# Patient Record
Sex: FEMALE | Race: WHITE | NOT HISPANIC OR LATINO | Employment: UNEMPLOYED | URBAN - NONMETROPOLITAN AREA
[De-identification: names, ages, dates, MRNs, and addresses within clinical notes are randomized per-mention and may not be internally consistent; named-entity substitution may affect disease eponyms.]

---

## 2019-06-06 ENCOUNTER — TRANSCRIBE ORDERS (OUTPATIENT)
Dept: ULTRASOUND IMAGING | Facility: HOSPITAL | Age: 59
End: 2019-06-06

## 2019-06-06 DIAGNOSIS — I70.0 ATHEROSCLEROSIS OF AORTA (HCC): Primary | ICD-10-CM

## 2019-06-19 ENCOUNTER — HOSPITAL ENCOUNTER (OUTPATIENT)
Dept: ULTRASOUND IMAGING | Facility: HOSPITAL | Age: 59
Discharge: HOME | End: 2019-06-19
Admitting: NURSE PRACTITIONER

## 2019-06-19 DIAGNOSIS — I70.0 ATHEROSCLEROSIS OF AORTA (HCC): ICD-10-CM

## 2019-06-19 PROCEDURE — 76706 US ABDL AORTA SCREEN AAA: CPT

## 2020-01-16 ENCOUNTER — HOSPITAL ENCOUNTER (OUTPATIENT)
Dept: GENERAL RADIOLOGY | Facility: HOSPITAL | Age: 60
Discharge: HOME | End: 2020-01-16

## 2020-01-16 ENCOUNTER — HOSPITAL ENCOUNTER (OUTPATIENT)
Dept: GENERAL RADIOLOGY | Facility: HOSPITAL | Age: 60
Discharge: HOME | End: 2020-01-16
Admitting: NURSE PRACTITIONER

## 2020-01-16 DIAGNOSIS — M54.2 NECK PAIN: ICD-10-CM

## 2020-01-16 DIAGNOSIS — M25.551 PAIN OF BOTH HIP JOINTS: ICD-10-CM

## 2020-01-16 DIAGNOSIS — M81.0 OSTEOPOROSIS OF LUMBAR SPINE: ICD-10-CM

## 2020-01-16 DIAGNOSIS — M25.552 PAIN OF BOTH HIP JOINTS: ICD-10-CM

## 2020-01-16 DIAGNOSIS — M54.50 LOW BACK PAIN, UNSPECIFIED BACK PAIN LATERALITY, UNSPECIFIED CHRONICITY, UNSPECIFIED WHETHER SCIATICA PRESENT: ICD-10-CM

## 2020-01-16 DIAGNOSIS — M54.9 BACK PAIN, UNSPECIFIED BACK LOCATION, UNSPECIFIED BACK PAIN LATERALITY, UNSPECIFIED CHRONICITY: ICD-10-CM

## 2020-01-16 PROCEDURE — 72220 X-RAY EXAM SACRUM TAILBONE: CPT

## 2020-01-16 PROCEDURE — 72110 X-RAY EXAM L-2 SPINE 4/>VWS: CPT

## 2020-01-16 PROCEDURE — 72050 X-RAY EXAM NECK SPINE 4/5VWS: CPT

## 2020-01-16 PROCEDURE — 73522 X-RAY EXAM HIPS BI 3-4 VIEWS: CPT

## 2020-03-05 RX ORDER — ATORVASTATIN CALCIUM 20 MG/1
20 TABLET, FILM COATED ORAL NIGHTLY
COMMUNITY
End: 2022-10-03

## 2020-03-05 RX ORDER — MELOXICAM 7.5 MG/1
7.5 TABLET ORAL 2 TIMES DAILY
COMMUNITY
End: 2022-05-03

## 2020-03-05 RX ORDER — CALCIUM CITRATE/VITAMIN D3 200MG-6.25
1 TABLET ORAL DAILY
COMMUNITY
End: 2022-05-03

## 2020-03-05 RX ORDER — TIZANIDINE 4 MG/1
4 TABLET ORAL 2 TIMES DAILY
COMMUNITY
End: 2022-05-03

## 2020-03-05 RX ORDER — BIOTIN 1000 MCG
2 TABLET,CHEWABLE ORAL DAILY
COMMUNITY
End: 2022-05-03

## 2020-03-05 RX ORDER — CYANOCOBALAMIN (VITAMIN B-12) 500 MCG
500 TABLET ORAL DAILY
COMMUNITY
End: 2022-05-03

## 2020-03-09 ENCOUNTER — ANESTHESIA (OUTPATIENT)
Dept: GASTROENTEROLOGY | Facility: HOSPITAL | Age: 60
End: 2020-03-09

## 2020-03-09 ENCOUNTER — HOSPITAL ENCOUNTER (OUTPATIENT)
Facility: HOSPITAL | Age: 60
Setting detail: HOSPITAL OUTPATIENT SURGERY
Discharge: HOME | End: 2020-03-09
Attending: INTERNAL MEDICINE | Admitting: INTERNAL MEDICINE

## 2020-03-09 ENCOUNTER — ANESTHESIA EVENT (OUTPATIENT)
Dept: GASTROENTEROLOGY | Facility: HOSPITAL | Age: 60
End: 2020-03-09

## 2020-03-09 VITALS
SYSTOLIC BLOOD PRESSURE: 123 MMHG | BODY MASS INDEX: 25.3 KG/M2 | TEMPERATURE: 97.5 F | HEART RATE: 67 BPM | DIASTOLIC BLOOD PRESSURE: 66 MMHG | OXYGEN SATURATION: 100 % | WEIGHT: 134 LBS | HEIGHT: 61 IN | RESPIRATION RATE: 18 BRPM

## 2020-03-09 PROCEDURE — 25010000002 PROPOFOL 10 MG/ML EMULSION: Performed by: NURSE ANESTHETIST, CERTIFIED REGISTERED

## 2020-03-09 RX ORDER — LIDOCAINE HYDROCHLORIDE 20 MG/ML
INJECTION, SOLUTION EPIDURAL; INFILTRATION; INTRACAUDAL; PERINEURAL AS NEEDED
Status: DISCONTINUED | OUTPATIENT
Start: 2020-03-09 | End: 2020-03-09 | Stop reason: SURG

## 2020-03-09 RX ORDER — PROPOFOL 10 MG/ML
VIAL (ML) INTRAVENOUS AS NEEDED
Status: DISCONTINUED | OUTPATIENT
Start: 2020-03-09 | End: 2020-03-09 | Stop reason: SURG

## 2020-03-09 RX ORDER — DEXTROSE AND SODIUM CHLORIDE 5; .45 G/100ML; G/100ML
30 INJECTION, SOLUTION INTRAVENOUS CONTINUOUS PRN
Status: DISCONTINUED | OUTPATIENT
Start: 2020-03-09 | End: 2020-03-09 | Stop reason: HOSPADM

## 2020-03-09 RX ADMIN — LIDOCAINE HYDROCHLORIDE 50 MG: 20 INJECTION, SOLUTION EPIDURAL; INFILTRATION; INTRACAUDAL; PERINEURAL at 14:08

## 2020-03-09 RX ADMIN — DEXTROSE AND SODIUM CHLORIDE 30 ML/HR: 5; 450 INJECTION, SOLUTION INTRAVENOUS at 13:32

## 2020-03-09 RX ADMIN — PROPOFOL 20 MG: 10 INJECTION, EMULSION INTRAVENOUS at 14:13

## 2020-03-09 RX ADMIN — PROPOFOL 20 MG: 10 INJECTION, EMULSION INTRAVENOUS at 14:15

## 2020-03-09 RX ADMIN — PROPOFOL 70 MG: 10 INJECTION, EMULSION INTRAVENOUS at 14:08

## 2020-03-09 RX ADMIN — PROPOFOL 20 MG: 10 INJECTION, EMULSION INTRAVENOUS at 14:11

## 2020-03-09 NOTE — ANESTHESIA POSTPROCEDURE EVALUATION
Patient: Radha Duarte    Procedure Summary     Date:  03/09/20 Room / Location:  [facility] ENDOSCOPY 2 / [facility] ENDOSCOPY    Anesthesia Start:  1406 Anesthesia Stop:  1422    Procedure:  COLONOSCOPY (N/A ) Diagnosis:       Screen for colon cancer      (Screen for colon cancer [Z12.11])    Surgeon:  Bishop Rodriguez DO Provider:  Maged Lee CRNA    Anesthesia Type:  MAC ASA Status:  2          Anesthesia Type: MAC    Vitals  No vitals data found for the desired time range.          Post Anesthesia Care and Evaluation    Patient location during evaluation: bedside  Patient participation: complete - patient participated  Level of consciousness: awake and alert  Pain score: 0  Pain management: adequate  Airway patency: patent  Anesthetic complications: No anesthetic complications  PONV Status: none  Cardiovascular status: acceptable  Respiratory status: acceptable, unassisted, room air and spontaneous ventilation  Hydration status: acceptable

## 2020-03-09 NOTE — ANESTHESIA PREPROCEDURE EVALUATION
Anesthesia Evaluation     Patient summary reviewed and Nursing notes reviewed   NPO Solid Status: > 8 hours  NPO Liquid Status: > 8 hours           Airway   Mallampati: III  TM distance: >3 FB  Neck ROM: full  No difficulty expected  Dental - normal exam     Pulmonary - normal exam   (+) a smoker,   Cardiovascular - normal exam    (+) hyperlipidemia,       Neuro/Psych  GI/Hepatic/Renal/Endo      Musculoskeletal     Abdominal    Substance History      OB/GYN          Other                      Anesthesia Plan    ASA 2     MAC     intravenous induction     Anesthetic plan, all risks, benefits, and alternatives have been provided, discussed and informed consent has been obtained with: patient.    Plan discussed with CRNA.

## 2020-03-09 NOTE — H&P
Tori Tejeda DO,[facility]  Gastroenterology  Hepatology  Endoscopy  Board Certified in Internal Medicine and gastroenterology  [address], [address]  [place]. [address]  - [phone]   F - [phone]     GASTROENTEROLOGY HISTORY AND PHYSICAL  NOTE   TORI TEJEDA DO.         SUBJECTIVE:   3/9/2020    Name: Radha Duarte  DOD: 1960        Chief Complaint:       Subjective : Screening for colon cancer    Patient is 59 y.o. female presents with desire for elective colonoscopy.      ROS/HISTORY/ CURRENT MEDICATIONS/OBJECTIVE/VS/PE:   Review of Systems:  All systems unremarkable unless specified below.  Constitutional   HENT  Eyes   Respiratory    Cardiovascular  Gastrointestinal   Endocrine  Genitourinary    Musculoskeletal   Skin  Allergic/Immunologic    Neurological    Hematological  Psychiatric/Behavioral    History:     Past Medical History:   Diagnosis Date   • Hyperlipidemia    • Osteoporosis      Past Surgical History:   Procedure Laterality Date   • VARICOSE VEIN SURGERY       History reviewed. No pertinent family history.  Social History     Tobacco Use   • Smoking status: Current Every Day Smoker     Packs/day: 1.00     Types: Cigarettes     Start date: 1982   • Smokeless tobacco: Never Used   Substance Use Topics   • Alcohol use: Never     Frequency: Never   • Drug use: Never     Prior to Admission medications    Medication Sig Start Date End Date Taking? Authorizing Provider   atorvastatin (LIPITOR) 20 MG tablet Take 20 mg by mouth Daily.   Yes ProviderOmid MD   Biotin 1000 MCG chewable tablet Chew 2 tablets Daily.   Yes ProviderOmid MD   Calcium Carb-Cholecalciferol (CALCIUM/VITAMIN D PO) Take 1 tablet by mouth 2 (Two) Times a Day.   Yes Omid Barber MD   Cyanocobalamin (VITAMIN B 12) 500 MCG tablet Take 500 mcg by mouth Daily.   Yes Omid Barber MD   Ibandronate Sodium (BONIVA PO) Take 1 capsule by mouth Every 30 (Thirty) Days.   Yes  Provider, MD Omid   meloxicam (MOBIC) 7.5 MG tablet Take 7.5 mg by mouth 2 (Two) Times a Day.   Yes Omid Barber MD   tiZANidine (ZANAFLEX) 4 MG tablet Take 4 mg by mouth 2 (Two) Times a Day.   Yes Omid Barber MD   Multiple Vitamins-Minerals (MULTIVITAMIN GUMMIES WOMENS) chewable tablet Chew 1 capsule Daily.    Provider, MD Omid     Allergies:  Patient has no known allergies.    I have reviewed the patients medical history, surgical history and family history in the available medical record system.     Current Medications:     Current Facility-Administered Medications   Medication Dose Route Frequency Provider Last Rate Last Dose   • dextrose 5 % and sodium chloride 0.45 % infusion  30 mL/hr Intravenous Continuous PRN Bishop Rodriguez DO 30 mL/hr at 03/09/20 1332 30 mL/hr at 03/09/20 1332       Objective     Physical Exam:   Temp:  [97.8 °F (36.6 °C)] 97.8 °F (36.6 °C)  Heart Rate:  [68] 68  Resp:  [18] 18  BP: (123)/(61) 123/61    Physical Exam:  General Appearance:    Alert, cooperative, in no acute distress   Head:    Normocephalic, without obvious abnormality, atraumatic   Eyes:            Lids and lashes normal, conjunctivae and sclerae normal, no   icterus, no pallor, corneas clear, PERRLA   Ears:    Ears appear intact with no abnormalities noted   Throat:   No oral lesions, no thrush, oral mucosa moist   Neck:   No adenopathy, supple, trachea midline, no thyromegaly, no     carotid bruit, no JVD   Back:     No kyphosis present, no scoliosis present, no skin lesions,       erythema or scars, no tenderness to percussion or                   palpation,   range of motion normal   Lungs:     Clear to auscultation,respirations regular, even and                   unlabored    Heart:    Regular rhythm and normal rate, normal S1 and S2, no            murmur, no gallop, no rub, no click   Breast Exam:    Deferred   Abdomen:     Normal bowel sounds, no masses, no organomegaly, soft         non-tender, non-distended, no guarding, no rebound                 tenderness   Genitalia:    Deferred   Extremities:   Moves all extremities well, no edema, no cyanosis, no              redness   Pulses:   Pulses palpable and equal bilaterally   Skin:   No bleeding, bruising or rash   Lymph nodes:   No palpable adenopathy   Neurologic:   Cranial nerves 2 - 12 grossly intact, sensation intact, DTR        present and equal bilaterally      Results Review:     No results found for: WBC, HGB, HCT, PLT          No results found for: LIPASE  No results found for: INR  No results found for: CULTURE    Radiology Review:  Imaging Results (Last 72 Hours)     ** No results found for the last 72 hours. **           I reviewed the patient's new clinical results.  I reviewed the patient's new imaging results and agree with the interpretation.     ASSESSMENT/PLAN:   ASSESSMENT:  1.  Screen for colon cancer    PLAN:  1.  Colonoscopy    Risk and benefits associated with the procedure are reviewed with the patient.  The patient wished to proceed     Bishop Rodriguez DO  03/09/20  2:04 PM

## 2021-10-18 ENCOUNTER — HOSPITAL ENCOUNTER (OUTPATIENT)
Dept: GENERAL RADIOLOGY | Facility: HOSPITAL | Age: 61
Discharge: HOME | End: 2021-10-18
Admitting: NURSE PRACTITIONER

## 2021-10-18 DIAGNOSIS — R22.1 NECK MASS: ICD-10-CM

## 2021-10-18 DIAGNOSIS — J41.0 SMOKERS' COUGH (HCC): ICD-10-CM

## 2021-10-18 PROCEDURE — 71046 X-RAY EXAM CHEST 2 VIEWS: CPT

## 2022-04-28 ENCOUNTER — TELEPHONE (OUTPATIENT)
Dept: ONCOLOGY | Facility: CLINIC | Age: 62
End: 2022-04-28

## 2022-04-28 NOTE — TELEPHONE ENCOUNTER
Spoke withy daughter Viri Hayward today concerning transfer of care as the nurse navigator in breast cancer care. Discussed current status and arranged for consult with med onc Dr. Troncoso here next week on 5/3/2022 at 9:15. Pt daughter stated thankfulness for [facility] and grateful for the timeliness. Informed pt daughter of my role in breast cancer care her at [facility] and provided my contact information for pt or daughter to reach out anytime with concerns or questions. Encouraged to reach out anytime. Will make face to face contact with pt at [facility] next week and will follow and be available throughout cancer care as needed for support, education, and care coordination. Pt daughter stated understanding of all discussed and denied further questions today.

## 2022-04-28 NOTE — TELEPHONE ENCOUNTER
----- Message from Bri Greene RN sent at 4/27/2022  3:52 PM CDT -----  Regarding: Transfer Care  Caridad,    This pt is receiving tx at [facility], but per the daughter, wants to transfer her care here. Carolin Paredes is her neighbor, and the daughter asked her for help, so she came to me. My understanding is the patients  also has liver cancer, and she had been caring for him. I dont see a path report, but per the daughter, shes triple neg breast, and enlarged lymph nodes are causing her arm to swell. It looks like she had a US last week and has DVT. It looks like she got Taxol/Carbo today, and is due again Friday. I told Carolin to let the daughter know we wouldn't be able to get her moved by Friday, but I would have you call her to help coordinate.     Daughter is Viri Hayward, [phone]. Per Carolin, there are two daughters swapping up caregiver duties for their parents.     Thanks!

## 2022-05-03 ENCOUNTER — CONSULT (OUTPATIENT)
Dept: ONCOLOGY | Facility: CLINIC | Age: 62
End: 2022-05-03

## 2022-05-03 ENCOUNTER — DOCUMENTATION (OUTPATIENT)
Dept: ONCOLOGY | Facility: CLINIC | Age: 62
End: 2022-05-03

## 2022-05-03 ENCOUNTER — LAB (OUTPATIENT)
Dept: ONCOLOGY | Facility: HOSPITAL | Age: 62
End: 2022-05-03

## 2022-05-03 VITALS
DIASTOLIC BLOOD PRESSURE: 60 MMHG | WEIGHT: 136.1 LBS | SYSTOLIC BLOOD PRESSURE: 129 MMHG | OXYGEN SATURATION: 96 % | BODY MASS INDEX: 25.04 KG/M2 | TEMPERATURE: 96.9 F | HEART RATE: 82 BPM | HEIGHT: 62 IN

## 2022-05-03 DIAGNOSIS — C50.919 MALIGNANT NEOPLASM OF FEMALE BREAST, UNSPECIFIED ESTROGEN RECEPTOR STATUS, UNSPECIFIED LATERALITY, UNSPECIFIED SITE OF BREAST: ICD-10-CM

## 2022-05-03 DIAGNOSIS — C50.919 MALIGNANT NEOPLASM OF FEMALE BREAST, UNSPECIFIED ESTROGEN RECEPTOR STATUS, UNSPECIFIED LATERALITY, UNSPECIFIED SITE OF BREAST: Primary | ICD-10-CM

## 2022-05-03 DIAGNOSIS — I82.A12 ACUTE DEEP VEIN THROMBOSIS (DVT) OF AXILLARY VEIN OF LEFT UPPER EXTREMITY: ICD-10-CM

## 2022-05-03 DIAGNOSIS — G89.3 CANCER ASSOCIATED PAIN: ICD-10-CM

## 2022-05-03 LAB
ALBUMIN SERPL-MCNC: 3.4 G/DL (ref 3.5–5.2)
ALBUMIN/GLOB SERPL: 1.3 G/DL
ALP SERPL-CCNC: 77 U/L (ref 39–117)
ALT SERPL W P-5'-P-CCNC: 62 U/L (ref 1–33)
ANION GAP SERPL CALCULATED.3IONS-SCNC: 8 MMOL/L (ref 5–15)
AST SERPL-CCNC: 66 U/L (ref 1–32)
BASOPHILS # BLD AUTO: 0.01 10*3/MM3 (ref 0–0.2)
BASOPHILS NFR BLD AUTO: 0.3 % (ref 0–1.5)
BILIRUB SERPL-MCNC: 0.7 MG/DL (ref 0–1.2)
BUN SERPL-MCNC: 10 MG/DL (ref 8–23)
BUN/CREAT SERPL: 16.4 (ref 7–25)
CALCIUM SPEC-SCNC: 8.5 MG/DL (ref 8.6–10.5)
CHLORIDE SERPL-SCNC: 104 MMOL/L (ref 98–107)
CO2 SERPL-SCNC: 27 MMOL/L (ref 22–29)
CREAT SERPL-MCNC: 0.61 MG/DL (ref 0.57–1)
DEPRECATED RDW RBC AUTO: 40.3 FL (ref 37–54)
EGFRCR SERPLBLD CKD-EPI 2021: 100.6 ML/MIN/1.73
EOSINOPHIL # BLD AUTO: 0.04 10*3/MM3 (ref 0–0.4)
EOSINOPHIL NFR BLD AUTO: 1 % (ref 0.3–6.2)
ERYTHROCYTE [DISTWIDTH] IN BLOOD BY AUTOMATED COUNT: 13.2 % (ref 12.3–15.4)
GLOBULIN UR ELPH-MCNC: 2.7 GM/DL
GLUCOSE SERPL-MCNC: 89 MG/DL (ref 65–99)
HCT VFR BLD AUTO: 28.5 % (ref 34–46.6)
HGB BLD-MCNC: 9.7 G/DL (ref 12–15.9)
IMM GRANULOCYTES # BLD AUTO: 0.02 10*3/MM3 (ref 0–0.05)
IMM GRANULOCYTES NFR BLD AUTO: 0.5 % (ref 0–0.5)
LYMPHOCYTES # BLD AUTO: 0.64 10*3/MM3 (ref 0.7–3.1)
LYMPHOCYTES NFR BLD AUTO: 16.6 % (ref 19.6–45.3)
MCH RBC QN AUTO: 29 PG (ref 26.6–33)
MCHC RBC AUTO-ENTMCNC: 34 G/DL (ref 31.5–35.7)
MCV RBC AUTO: 85.3 FL (ref 79–97)
MONOCYTES # BLD AUTO: 0.1 10*3/MM3 (ref 0.1–0.9)
MONOCYTES NFR BLD AUTO: 2.6 % (ref 5–12)
NEUTROPHILS NFR BLD AUTO: 3.05 10*3/MM3 (ref 1.7–7)
NEUTROPHILS NFR BLD AUTO: 79 % (ref 42.7–76)
NRBC BLD AUTO-RTO: 0 /100 WBC (ref 0–0.2)
PLATELET # BLD AUTO: 193 10*3/MM3 (ref 140–450)
PMV BLD AUTO: 10.4 FL (ref 6–12)
POTASSIUM SERPL-SCNC: 3.4 MMOL/L (ref 3.5–5.2)
PROT SERPL-MCNC: 6.1 G/DL (ref 6–8.5)
RBC # BLD AUTO: 3.34 10*6/MM3 (ref 3.77–5.28)
SODIUM SERPL-SCNC: 139 MMOL/L (ref 136–145)
WBC NRBC COR # BLD: 3.86 10*3/MM3 (ref 3.4–10.8)

## 2022-05-03 PROCEDURE — G0463 HOSPITAL OUTPT CLINIC VISIT: HCPCS | Performed by: INTERNAL MEDICINE

## 2022-05-03 PROCEDURE — 99205 OFFICE O/P NEW HI 60 MIN: CPT | Performed by: INTERNAL MEDICINE

## 2022-05-03 PROCEDURE — 80053 COMPREHEN METABOLIC PANEL: CPT

## 2022-05-03 PROCEDURE — 85025 COMPLETE CBC W/AUTO DIFF WBC: CPT

## 2022-05-03 RX ORDER — PROMETHAZINE HYDROCHLORIDE 25 MG/1
25 SUPPOSITORY RECTAL AS NEEDED
COMMUNITY
Start: 2022-04-27 | End: 2022-06-14

## 2022-05-03 RX ORDER — OMEPRAZOLE 20 MG/1
20 CAPSULE, DELAYED RELEASE ORAL 2 TIMES DAILY
COMMUNITY
Start: 2022-04-22 | End: 2022-07-22 | Stop reason: SDUPTHER

## 2022-05-03 RX ORDER — ONDANSETRON HYDROCHLORIDE 8 MG/1
8 TABLET, FILM COATED ORAL EVERY 8 HOURS PRN
COMMUNITY
Start: 2022-05-02 | End: 2022-07-01 | Stop reason: SDUPTHER

## 2022-05-03 RX ORDER — MORPHINE SULFATE 30 MG/1
60 TABLET, FILM COATED, EXTENDED RELEASE ORAL 2 TIMES DAILY
Qty: 60 TABLET | Refills: 0 | Status: SHIPPED | OUTPATIENT
Start: 2022-05-03 | End: 2022-05-18 | Stop reason: SDUPTHER

## 2022-05-03 RX ORDER — POTASSIUM CHLORIDE 20 MEQ/1
20 TABLET, EXTENDED RELEASE ORAL DAILY
COMMUNITY
Start: 2022-04-29 | End: 2022-05-25 | Stop reason: SDUPTHER

## 2022-05-03 RX ORDER — DOCUSATE SODIUM 100 MG/1
100 CAPSULE, LIQUID FILLED ORAL 2 TIMES DAILY
COMMUNITY
End: 2022-05-17

## 2022-05-03 RX ORDER — HYDROCODONE BITARTRATE AND ACETAMINOPHEN 5; 325 MG/1; MG/1
TABLET ORAL
COMMUNITY
Start: 2022-04-27 | End: 2022-05-16 | Stop reason: SDUPTHER

## 2022-05-03 RX ORDER — APIXABAN 5 MG/1
TABLET, FILM COATED ORAL
COMMUNITY
Start: 2022-04-22 | End: 2022-05-18 | Stop reason: SDUPTHER

## 2022-05-03 RX ORDER — MORPHINE SULFATE 30 MG/1
60 TABLET, FILM COATED, EXTENDED RELEASE ORAL 2 TIMES DAILY
Qty: 60 TABLET | Refills: 0 | Status: SHIPPED | OUTPATIENT
Start: 2022-05-03 | End: 2022-05-03 | Stop reason: SDUPTHER

## 2022-05-03 RX ORDER — MORPHINE SULFATE 30 MG/1
TABLET, FILM COATED, EXTENDED RELEASE ORAL
COMMUNITY
Start: 2022-04-20 | End: 2022-05-03 | Stop reason: SDUPTHER

## 2022-05-03 RX ORDER — PROCHLORPERAZINE MALEATE 10 MG
TABLET ORAL
COMMUNITY
Start: 2022-04-22 | End: 2022-05-17

## 2022-05-03 NOTE — PROGRESS NOTES
"Distress Screening Follow-up    Diagnosis: Radha Duarte presents as 61 year old female from [facility] newly diagnosed with Malignant neoplasm of breast, metastatic. ,  Her Diagnosis and initiation of treatment was started at [facility]. Pt. Presents to this center for recommendations and to establish care as it  is closer to her home.    Pt. Is accompanied in the room by her daughter, Brandon Hayward.  Daughter who is her durable general POA .     Location of Distress Screening: Medical oncology consult. Out patient     Distress Level: 5 (5/3/2022  9:00 AM)    Physical Concerns/  Addressed by provider     Eating: Yes  Fatigue: Yes  Memory/Concentration: Yes  Pain: Yes  Sleep: Yes    Practical Problems:  Pt. Started palliative treatment at [facility] where diagnosed  She has been contemplating moving care closer to her home and family.  She is currently staying with her daughter who lives in [place].      Treatment decisions: Yes    Emotional Concerns:  Pt. Presents alert and orietned  3, mood and affect consistent with depression and anxious distress.  Emotions present  consistent with her situation and adjustments  having new cancer diagnosis.      Depression: Yes  Sadness: Yes  Worry: Yes    Family Concerns: Pt. Is . Per feedback from patient and daughter, spouse is diagnosed with stage IV liver cancer. He is reportedly at ED at the time of this apt.  Pt with two adult daughters who are sharing support as both pt and spouse in need of care.  Close neighbors that are \"like\" family and are supportive and assisting,Extensive caregiving needs identified.      Dealing with partner: Yes  Family health issues: Yes          Interventions: LCSW introduced self and explained role and support to include scope of clinical practice in oncology setting.  LCSW offered person centered therapeutic feedback by means of collecting history, emotional support, active listening, validation of emotions and " clarification of feedback and recommendations for care.  Provided education related to oncology supports and services.    Discussed DME needs.  [employer] insurance that reportedly will assist with adult briefs.  Pt to advise if Rx is needed.   SW reviewed  POA on file, indicates POA is financial only. No Advance directive is on file.   Comments: No immediate needs were identified.  FLO will follow and assess further as pt plans to begin treatment at this center.

## 2022-05-05 ENCOUNTER — TELEPHONE (OUTPATIENT)
Dept: NUTRITION | Facility: HOSPITAL | Age: 62
End: 2022-05-05

## 2022-05-05 NOTE — PROGRESS NOTES
Adult Outpatient Nutrition  Assessment    Patient Name:  Radha Duarte  YOB: 1960  MRN: [id number]    Assessment Date:  5/5/2022    Comments: Chart review revealed 61WF coming to [facility] due to breast cancer. Has been receiving chemo in [place].  Moving care closer to home. Ht 62 in. Wt 136.1 lb (stable). Alb 3.4. Noted that  also very ill. No indication of restricted diet, food allergies or  immediate nutrition related problems. Attempted phone contact without success. Left message introducing self/nutrition services. Mailed nutrition suggestions with RDN's name/number to home.                         Electronically signed by:  Dolores Hinson RD  05/05/22 12:52 CDT

## 2022-05-06 ENCOUNTER — TELEPHONE (OUTPATIENT)
Dept: ONCOLOGY | Facility: HOSPITAL | Age: 62
End: 2022-05-06

## 2022-05-06 ENCOUNTER — DOCUMENTATION (OUTPATIENT)
Dept: ONCOLOGY | Facility: HOSPITAL | Age: 62
End: 2022-05-06

## 2022-05-06 NOTE — TELEPHONE ENCOUNTER
Contacted daughter and advised of chemo teach appt, labs, MD, chemo appts. Daughter verbalizes understanding.

## 2022-05-09 ENCOUNTER — OFFICE VISIT (OUTPATIENT)
Dept: ONCOLOGY | Facility: CLINIC | Age: 62
End: 2022-05-09

## 2022-05-09 VITALS
SYSTOLIC BLOOD PRESSURE: 120 MMHG | TEMPERATURE: 96.9 F | OXYGEN SATURATION: 96 % | WEIGHT: 128 LBS | RESPIRATION RATE: 18 BRPM | BODY MASS INDEX: 23.41 KG/M2 | HEART RATE: 77 BPM | DIASTOLIC BLOOD PRESSURE: 56 MMHG

## 2022-05-09 DIAGNOSIS — C50.919 MALIGNANT NEOPLASM OF FEMALE BREAST, UNSPECIFIED ESTROGEN RECEPTOR STATUS, UNSPECIFIED LATERALITY, UNSPECIFIED SITE OF BREAST: Primary | ICD-10-CM

## 2022-05-09 PROCEDURE — G0463 HOSPITAL OUTPT CLINIC VISIT: HCPCS | Performed by: NURSE PRACTITIONER

## 2022-05-09 PROCEDURE — 99213 OFFICE O/P EST LOW 20 MIN: CPT | Performed by: NURSE PRACTITIONER

## 2022-05-10 ENCOUNTER — DOCUMENTATION (OUTPATIENT)
Dept: NUTRITION | Facility: HOSPITAL | Age: 62
End: 2022-05-10

## 2022-05-10 NOTE — PROGRESS NOTES
5/9/2022    CHEMOTHERAPY PREPARATION    Radha Duarte  [id number]  1960    Chief Complaint: chemo education     History of present illness:  Radha uDarte is a 61 y.o. year old female who is here today for chemotherapy preparation and needs assessment. The patient has been diagnosed with breast cancer and is scheduled to begin treatment with Carboplatin and Paclitaxel.     Oncology History:    Oncology/Hematology History   Malignant neoplasm of female breast (HCC)   5/3/2022 Initial Diagnosis    Malignant neoplasm of female breast (HCC)     5/13/2022 -  Chemotherapy    OP BREAST  PACLitaxel / CARBOplatin AUC=1.5 (Weekly)         Past Medical History:   Diagnosis Date   • Acute deep vein thrombosis (DVT) of brachial vein of right upper extremity (HCC)    • Anemia    • Breast cancer (HCC)    • Hyperlipidemia    • Osteoporosis        Past Surgical History:   Procedure Laterality Date   • BREAST BIOPSY Right    • COLONOSCOPY N/A 03/09/2020    Procedure: COLONOSCOPY;  Surgeon: Bishop Rodriguez DO;  Location: [facility] ENDOSCOPY;  Service: Gastroenterology;  Laterality: N/A;   • SHOULDER MANIPULATION  2003   • VARICOSE VEIN SURGERY         MEDICATIONS: The current medication list was reviewed and reconciled.     Allergies:  is allergic to percocet [oxycodone-acetaminophen].    Family History   Problem Relation Age of Onset   • ALS Mother    • Ulcers Mother    • Heart disease Father    • Alcohol abuse Father    • Cancer Brother    • Diabetes Brother    • Stroke Brother    • COPD Brother    • Anuerysm Brother    • Stroke Paternal Aunt    • Heart disease Paternal Grandmother    • Diabetes Paternal Grandmother          Review of Systems    Physical Exam  Vital Signs: /56   Pulse 77   Temp 96.9 °F (36.1 °C)   Resp 18   Wt 58.1 kg (128 lb)   SpO2 96%   BMI 23.41 kg/m²    General Appearance:  alert, cooperative, no apparent distress and appears stated age   Neurologic/Psychiatric: A&O x 3, gait  "steady, appropriate affect   HEENT:  Normocephalic, without obvious abnormality, mucous membranes moist   Lungs:   Clear to auscultation bilaterally; respirations regular, even, and unlabored bilaterally   Heart:  Regular rate and rhythm, no murmurs appreciated   Extremities: Normal, atraumatic; no clubbing, cyanosis, or edema    Skin: No rashes, lesions, or abnormal coloration noted     ECOG Performance Status: (1) Restricted in Physically Strenuous Activity, Ambulatory & Able to Do Work of Light Nature          NEEDS ASSESSMENTS    Genetics  The patient's new diagnosis and family history have been reviewed for genetic counseling needs. A genetic referral is not recommended.     Psychosocial  The patient has completed a PHQ-2 Depression Screening today.   PHQ-9 results show 0 (No Depression). Patient has been seen by Aimee Addison LCSW  Copies of patient's questionnaires will be scanned into EMR for details and further reference.    Barriers to care  A barriers form was also completed by the patient today. We discussed services offered by our facility to help her have adequate access to care. Based upon barriers assessment today, the patient will not require a follow-up call from the  to further discuss needs.   A copy of the barriers form will also be scanned into EMR for details and further reference.       Advanced Care Planning  The patient and I discussed advanced care planning, \"Conversations that Matter\".   This service was offered, free of charge, for development of advance directives with a certified ACP facilitator.  The patient does have an up-to-date advanced directive. This document is not on file with our office.     Palliative Care  The patient and I discussed palliative care services. Palliative care is not the same as Hospice care. This is specialized medical care for people living with serious illness with the goal of improving quality of life for the patient and their family. Joe has " partnered with [facility] to offer our patients outpatient palliative care early along with their treatment to assist in coordination of care, symptom management, pain management, and medical decision making.  Oncology criteria for palliative care referral is met at this time. The patient is not interested in a palliative care consultation.     Additional Referral needs  none      CHEMOTHERAPY EDUCATION    Booklets Given: Chemo cares education sheets on Carboplatin and Paclitaxel and [facility] chemotherapy binder.     Chemotherapy/Biotherapy Education Sheets: (list all that apply)  nausea management, acid reflux management, diarrhea management, Cancer resourse contacts information, skin and mouth care and vaccination information                                                                                                                                                                 Chemotherapy Regimen:   Treatment Plans     Name Type Plan Dates Plan Provider         Active    OP BREAST  PACLitaxel / CARBOplatin AUC=1.5 (Weekly) ONCOLOGY TREATMENT  5/12/2022 - Present Trisha Meade MD                    TOPICS EDUCATION PROVIDED COMMENTS   ANEMIA:  role of RBC, cause, s/s, ways to manage, role of transfusion [x]    THROMBOCYTOPENIA:  role of platelet, cause, s/s, ways to prevent bleeding, things to avoid, when to seek help [x]    NEUTROPENIA:  role of WBC, cause, infection precautions, s/s of infection, when to call MD [x]    NUTRITION & APPETITE CHANGES:  importance of maintaining healthy diet & weight, ways to manage to improve intake, dietary consult, exercise regimen [x]    DIARRHEA:  causes, s/s of dehydration, ways to manage, dietary changes, when to call MD [x]    CONSTIPATION:  causes, ways to manage, dietary changes, when to call MD [x]    NAUSEA & VOMITING:  cause, use of antiemetics, dietary changes, when to call MD [x]    MOUTH SORES:  causes, oral care, ways  to manage [x]    ALOPECIA:  cause, ways to manage, resources [x]    INFERTILITY & SEXUALITY:  causes, fertility preservation options, sexuality changes, ways to manage, importance of birth control [x]    NERVOUS SYSTEM CHANGES:  causes, s/s, neuropathies, cognitive changes, ways to manage [x]    PAIN:  causes, ways to manage [x] ????   SKIN & NAIL CHANGES:  cause, s/s, ways to manage [x]    ORGAN TOXICITIES:  cause, s/s, need for diagnostic tests, labs, when to notify MD [x]    SURVIVORSHIP:  distress, distress assessment, secondary malignancies, early/late effects, follow-up, social issues, social support [x]    HOME CARE:  use of spill kits, storing of PO chemo, how to manage bodily fluids [x]    MISCELLANEOUS:  drug interactions, administration, vesicant, et [x]        Assessment and Plan:    Diagnoses and all orders for this visit:    1. Malignant neoplasm of female breast, unspecified estrogen receptor status, unspecified laterality, unspecified site of breast (HCC) (Primary)        This was a 20 minute face-to-face visit with 20 minutes spent in  counseling and coordination of care as documented above.   The patient and I have reviewed their new cancer diagnosis and scheduled treatment plan. Needs assessment was completed including genetics, psychosocial needs, barriers to care, VAD evaluation, advanced care planning, and palliative care services. Referrals have been ordered as appropriate based upon our evaluation and patient desires.     Chemotherapy teaching was also completed today as documented above. Adequate time was given to answer all questions to her satisfaction. Patient and family are aware of their care team members and contact information if they have questions or problems throughout the treatment course. Needs assessments and education has been completed. The patient is adequately prepared to begin treatment as scheduled.       Estefanía Brady, APRN

## 2022-05-10 NOTE — PROGRESS NOTES
Adult Outpatient Nutrition  Assessment    Patient Name:  Radha Duarte  YOB: 1960  MRN: [id number]    Assessment Date:  Entry from 5/9/22    CHART REVIEW  Radha Duarte   1960  61 y.o.  Wt: 128 lb  Ht: 62 in  Dx: breast cancer  Tx: chemo in [place] (moving care here)  Labs: alb 3.4    PATIENT/FAMILY COMMENTS  Weight Change: recent trend down  Diet: regular  Food Allergies: nkfa  Number of Feedings Daily:  Small frequent  Appetite/Intake: better  Supplements: Ensure  Meal Preparation: family   Nutrition Concerns:  * recent oral ulcers (healing)  No problems nausea/vomiting/constipation/diarrhea.       very ill (on Hospice)--much anxiety for pt/family.        GOAL(s)  -to optimize nutrition in attempt to prevent loss of LBM        EDUCATION  Discussed  -supplements (samples + recipes + discount coupons provided)  -importance of balanced nutrition/adequate protein  -importance of staying hydrated  -food safety    To reinforce education, written information with RD's name & number provided.  Patient/daughter very kind and receptive to suggestions--agreed to call on dietitian as needed.              Dolores Hinson RD                                Electronically signed by:  Dolores Hinson RD  05/10/22 08:19 CDT

## 2022-05-13 ENCOUNTER — APPOINTMENT (OUTPATIENT)
Dept: ONCOLOGY | Facility: CLINIC | Age: 62
End: 2022-05-13

## 2022-05-13 ENCOUNTER — APPOINTMENT (OUTPATIENT)
Dept: ONCOLOGY | Facility: HOSPITAL | Age: 62
End: 2022-05-13

## 2022-05-13 ENCOUNTER — DOCUMENTATION (OUTPATIENT)
Dept: ONCOLOGY | Facility: HOSPITAL | Age: 62
End: 2022-05-13

## 2022-05-13 DIAGNOSIS — C50.919 MALIGNANT NEOPLASM OF FEMALE BREAST, UNSPECIFIED ESTROGEN RECEPTOR STATUS, UNSPECIFIED LATERALITY, UNSPECIFIED SITE OF BREAST: Primary | ICD-10-CM

## 2022-05-15 PROBLEM — I82.A12 ACUTE DEEP VEIN THROMBOSIS (DVT) OF AXILLARY VEIN OF LEFT UPPER EXTREMITY: Status: ACTIVE | Noted: 2022-05-15

## 2022-05-15 NOTE — PROGRESS NOTES
REASON FOR CONSULTATION:  Metastatic breast cancer   Provide an opinion on any further workup or treatment                             REQUESTING PHYSICIAN:  Carloz Rachel MD    RECORDS OBTAINED:  Records of the patients history including those obtained from the referring provider were reviewed and summarized in detail.        History of Present Illness     This is a pleasant 62-year-old female who was seen in consultation at the request of Dr Rachel.   Her extensive history as below.    HEM/ONC HISTORY:  - 11/2021initially presented with right chest wall mass. Mammogram at the time was normal.  - 2/15/2022 CT neck showed a large, 5 cm, mass within the right retropectoral region which may be invading the anterior chest wall.  - 2/22/2022 CT chest showed Large necrotic right axillary/retropectoral lymphadenopathy, likely malignancy, Lymphadenopathy also present within the mediastinum, Subtle interstitial opacities suspected throughout both lungs.   - 2/22/2022 right chest lymph node core biopsy showed Poorly differentiated carcinoma. the immunostains panel is suggestive of metastatic mammary carcinoma. ER-[place]-HER2-. Foundation one sent out on 4/25 with result pending.   - 3/6/2022 established care in oncology clinic. Recommend PET/CT scan and breast MRI  - 3/15/2022 PET/CT scan showed Suspected primary mass in the right subpectoral chest wall with metastatic   lymphadenopathy in the right axillary, right internal mammary, bilateral supraclavicular, and mediastinal regions.  - 3/18/2022 breast MRI: Discontiguous mass and nonmass enhancement predominantly involving the  superior right breast is considered highly suspicious for breast malignancy, Abnormal axillary lymph nodes and abnormal lymph nodes along the right internal mammary chain.  - 3/22/2022 diagnostic mammogram showed hypoechoic nodule in the 12:00 position of the right breast, this  is located approximately 4 cm from the nipple, this is highly suspicious for  malignancy.  - 3/22/2022 right breast mass biopsy showed Very small foci of poorly differentiated carcinoma within angiolymphatic spaces, multifocal (consistent with lymphovascular space invasion). ER-HER2- tissue not adequate to check [place].   - 3/24/2022 refer to radiation oncology for palliative radiation therapy and radiation was delayed. But she was determined NOT a candidate for radiation due to rapid growth of the tumor on 4/26/2022.   - 4/22/2022 start on palliative carboplatin/palictaxel weekly . Also started on eliquis for RUE DVT.       Patient wanted to receive her chemo close to home and has been referred to me for further evaluation and management.    Past Medical History:   Diagnosis Date   • Acute deep vein thrombosis (DVT) of brachial vein of right upper extremity (HCC)    • Anemia    • Breast cancer (HCC)    • Hyperlipidemia    • Osteoporosis         Past Surgical History:   Procedure Laterality Date   • BREAST BIOPSY Right    • COLONOSCOPY N/A 03/09/2020    Procedure: COLONOSCOPY;  Surgeon: Bishop Rodriguez DO;  Location: [facility] ENDOSCOPY;  Service: Gastroenterology;  Laterality: N/A;   • SHOULDER MANIPULATION  2003   • VARICOSE VEIN SURGERY          Current Outpatient Medications on File Prior to Visit   Medication Sig Dispense Refill   • atorvastatin (LIPITOR) 20 MG tablet Take 20 mg by mouth Daily.     • Calcium Carb-Cholecalciferol (CALCIUM/VITAMIN D PO) Take 1 tablet by mouth 2 (Two) Times a Day.     • Eliquis 5 MG tablet tablet TAKE 2 TABLETS BY MOUTH TWICE DAILY FOR 7 DAYS THEN TAKE 1 TABLET BY MOUTH TWICE DAILY     • HYDROcodone-acetaminophen (NORCO) 5-325 MG per tablet TAKE 1 TABLET BY MOUTH EVERY 6 HOURS FOR 2 WEEKS AS NEEDED     • MAGIC MOUTHWASH 1/1/1 SUSP Apply 5 mL to the mouth or throat.     • omeprazole (priLOSEC) 20 MG capsule Take 20 mg by mouth 2 (Two) Times a Day.     • ondansetron (ZOFRAN) 8 MG tablet      • potassium chloride (K-DUR,KLOR-CON) 20 MEQ CR tablet      •  "prochlorperazine (COMPAZINE) 10 MG tablet      • Promethegan 25 MG suppository      • docusate sodium (COLACE) 100 MG capsule Take 100 mg by mouth 2 (Two) Times a Day.     • Ibandronate Sodium (BONIVA PO) Take 1 capsule by mouth Every 30 (Thirty) Days.       No current facility-administered medications on file prior to visit.        ALLERGIES:    Allergies   Allergen Reactions   • Percocet [Oxycodone-Acetaminophen] Nausea Only        Social History     Socioeconomic History   • Marital status:    Tobacco Use   • Smoking status: Current Every Day Smoker     Packs/day: 0.10     Types: Cigarettes     Start date: 1982   • Smokeless tobacco: Never Used   • Tobacco comment: PT states she is only smoking 1-2 cigerettes per day.   Substance and Sexual Activity   • Alcohol use: Never   • Drug use: Never   • Sexual activity: Defer        Family History   Problem Relation Age of Onset   • ALS Mother    • Ulcers Mother    • Heart disease Father    • Alcohol abuse Father    • Cancer Brother    • Diabetes Brother    • Stroke Brother    • COPD Brother    • Anuerysm Brother    • Stroke Paternal Aunt    • Heart disease Paternal Grandmother    • Diabetes Paternal Grandmother             Objective     Vitals:    05/03/22 0916   BP: 129/60   Pulse: 82   Temp: 96.9 °F (36.1 °C)   SpO2: 96%   Weight: 61.7 kg (136 lb 1.6 oz)   Height: 157.5 cm (62\")   PainSc:   2   PainLoc: Mouth     No flowsheet data found.    Physical Exam  Vitals and nursing note reviewed.   Constitutional:       Appearance: Normal appearance.   Cardiovascular:      Rate and Rhythm: Normal rate and regular rhythm.      Heart sounds: No murmur heard.  Pulmonary:      Effort: Pulmonary effort is normal. No respiratory distress.      Breath sounds: Normal breath sounds. No wheezing.   Chest:   Breasts:      Right: Axillary adenopathy present.       Lymphadenopathy:      Upper Body:      Right upper body: Axillary adenopathy present.   Neurological:      Mental " Status: She is alert.   Psychiatric:         Mood and Affect: Mood normal.         Behavior: Behavior normal.         Thought Content: Thought content normal.               RECENT LABS:Independently reviewed and summarized  Hematology WBC   Date Value Ref Range Status   05/03/2022 3.86 3.40 - 10.80 10*3/mm3 Final     RBC   Date Value Ref Range Status   05/03/2022 3.34 (L) 3.77 - 5.28 10*6/mm3 Final     Hemoglobin   Date Value Ref Range Status   05/03/2022 9.7 (L) 12.0 - 15.9 g/dL Final     Hematocrit   Date Value Ref Range Status   05/03/2022 28.5 (L) 34.0 - 46.6 % Final     Platelets   Date Value Ref Range Status   05/03/2022 193 140 - 450 10*3/mm3 Final        Lab Results   Component Value Date    GLUCOSE 89 05/03/2022    BUN 10 05/03/2022    CREATININE 0.61 05/03/2022    BCR 16.4 05/03/2022    K 3.4 (L) 05/03/2022    CO2 27.0 05/03/2022    CALCIUM 8.5 (L) 05/03/2022    ALBUMIN 3.40 (L) 05/03/2022    AST 66 (H) 05/03/2022    ALT 62 (H) 05/03/2022       Imaging (independently reviewed and summarized):     PET Scan (3/15/22)     FINDINGS:   HEAD AND NECK: The visualized brain demonstrates no definite focal abnormal FDG   activity. Salivary, tonsillar and laryngeal activity appears ordinary. Multiple   hypermetabolic supraclavicular lymph nodes are present bilaterally.     CHEST: There is a 7.7 cm area of markedly increased uptake in the right chest   wall, deep to the pectoralis muscle. This demonstrates a maximum SUV of 22.2.   Multiple supraclavicular and axillary lymph nodes are present and demonstrate   increased metabolic activity. Additionally, there is increased uptake within   mildly enlarged paratracheal and para-aortic lymph nodes. There is also   increased uptake in the region of the right internal mammary chain. The   previously seen diffuse nodular or miliary pattern in the lungs is not   appreciated on this study. No dominant lung nodule is identified.     ABDOMEN AND PELVIS: Normal physiologic  activity is noted in the liver, spleen,   stomach and bowel. No adrenal hypermetabolism is noted. Urinary activity is   noted in the kidneys and bladder. No FDG-avid lymphadenopathy is appreciated.     MUSCULOSKELETAL: No FDG-avid osseous metastases are demonstrated.      3/18/22    Impression    IMPRESSION:   1.  Discontiguous mass and nonmass enhancement predominantly involving the   superior right breast is considered highly suspicious for breast malignancy   given the matted adenopathy throughout the right axilla.  Given absence of prior   breast imaging made available for comparison, updated diagnostic mammogram is   recommended for further evaluation.  Total extent of the discontiguous   enhancement is estimated to be 8.9 x 2.7 x 4.3 cm with 8 mm mass and/or 3.6 cm   focal area of nonmass enhancement that can be targeted along the pathologic   extent.  Findings are felt to likely represent patient's primary malignancy.  A   convincing imaging correlate was not identified, MRI guided biopsy of these   areas could also be considered.     2.  Matted adenopathy in the right breast represents known carcinoma with   CT-guided biopsy performed on 2/22/2022.  Abnormal axillary lymph nodes extend   from lower axilla to level 3 locations with abnormal Torsten's node also   identified.  Most dominant mass in the high axilla does appear to invade the   lateral musculature and abuts the ribs, although evaluation is somewhat limited   given the superior location.  Findings are also evaluated on cross-sectional   imaging including PET/CT performed on 3/15/2022.  Repeat focal ultrasound of the   right axilla is recommended with ultrasound-guided biopsy of one of the   morphologically abnormal lymph nodes if indicated.     3.  Abnormal lymph nodes along the right internal mammary chain are felt to be   pathologic and the associated radiotracer uptake from PET/CT examination.   Appropriate management in  these areas are  recommended.     4.  No MRI evidence of malignancy left breast.         US venous doppler UE right (4/22/22)     IMPRESSION:   1.  Exam positive for extensive deep and thrombosis with involvement of the   subclavian, axillary and brachial veins.   2.  Superficial thrombophlebitis with thrombus seen in the basilic vein.   3.  Right axillary lymphadenopathy.      Pathology (result reviewed):      2/22/2022 right chest lymph node core biopsy showed Poorly differentiated carcinoma. the immunostains panel is suggestive of metastatic mammary carcinoma. ER-[place]-HER2-. Foundation one sent out on 4/25 with result pending.       I have reviewed old records and summarized them in HPI as well as assessment and plan section of this note.       Radha Duarte reports a pain score of 2.  Given her pain assessment as noted, treatment options were discussed and the following options were decided upon as a follow-up plan to address the patient's pain: prescription for opiod analgesics.    Patient screened positive for depression based on a PHQ-9 score of 0 on 5/9/2022.          Diagnosis:   (1) Metastatic breast cancer with distant LN (mediastinal) metastases   Triple negative     (2) Cancer associated pain   (3) Right upper extremity DVT     All are new diagnosis/problems for me.     Assessment & Plan     (1) Metastatic breast cancer with distant LN (mediastinal) metastases     Extensive old medical records reviewed.   Dr Rachel consultation and progress notes reviewed.   PET , mammogram, MRI results reviewed.   Pathology result reviewed.     Discussed diagnosis, prognosis and treatment options at length.  Patient with aggressive  Triple negative breast cancer.  Discussed with patient that this is incurable disease and goal of treatment is strictly palliative in nature.  Alternative options were discussed as well.    Patient so far has received 2 weekly doses of carboplatin and paclitaxel at outside facility.  Tolerated well  without any major side effects.  She did seem to have early response based on her symptom relief.    Discussed with patient that given her overall good tolerance and early signs of improvement in the disease I am inclined to continue weekly carboplatin and paclitaxel here locally.    Side effects associated with chemotherapy including fatigue, nausea, upset stomach, abdominal pain, constipation, diarrhea, change in appetite, hair loss which potentially could be permanent, mucositis, cytopenias, risk of infection, risk of bleeding, peripheral neuropathy which could be permanent, rare risk of cardiotoxicity/hepatotoxicity/nephrotoxicity discussed at length.  After lengthy discussion patient and family were in agreement in proceeding with weekly carboplatin and paclitaxel.    She has extensive right subclavian axillary DVT.  She does not have any central access.  For now I will start her on weekly carboplatin and paclitaxel with peripheral IV and we discussed with her about potentially central line placement on the left side of the neck on her next visit.    Her foundation 1 panel has been pending.  I will request this record once available.    Given triple negative cancer I will also order genetic testing for BRCA which could make her eligible for PARP inhibitor.    Recommendations:   · Discussed diagnosis, prognosis and treatment options at length.  · Recommend palliative chemotherapy with weekly carboplatin and paclitaxel.  · I will plan to see her back in 1 week with CBC, CMP prior to her chemotherapy.  · Genetic testing for BRCA mutation ordered  · Follow-up on foundation 1 panel.    (2) Cancer associated pain    New issue for me.  Patient is currently taking MS Contin 30 mg every 12 hours which is helping with pain.  She is also taking hydrocodone 5 mg intermittently for breakthrough pain.  She is requesting new prescription of MS Contin which was sent to the pharmacy       (3) Right upper extremity DVT       Suspect right upper extremity DVT secondary to hypercoagulable state from malignancy.  She has been getting at least bleeding concerns.  Continue to monitor.      I spent 70 minutes caring for Radha on this date of service. This time includes time spent by me in the following activities: preparing for the visit, reviewing tests, obtaining and/or reviewing a separately obtained history, performing a medically appropriate examination and/or evaluation, counseling and educating the patient/family/caregiver, ordering medications, tests, or procedures, referring and communicating with other health care professionals, documenting information in the medical record, independently interpreting results and communicating that information with the patient/family/caregiver and care coordination

## 2022-05-16 ENCOUNTER — DOCUMENTATION (OUTPATIENT)
Dept: ONCOLOGY | Facility: HOSPITAL | Age: 62
End: 2022-05-16

## 2022-05-16 ENCOUNTER — OFFICE VISIT (OUTPATIENT)
Dept: ONCOLOGY | Facility: CLINIC | Age: 62
End: 2022-05-16

## 2022-05-16 ENCOUNTER — INFUSION (OUTPATIENT)
Dept: ONCOLOGY | Facility: HOSPITAL | Age: 62
End: 2022-05-16

## 2022-05-16 VITALS
SYSTOLIC BLOOD PRESSURE: 156 MMHG | HEART RATE: 84 BPM | WEIGHT: 127.5 LBS | DIASTOLIC BLOOD PRESSURE: 71 MMHG | OXYGEN SATURATION: 99 % | TEMPERATURE: 97.8 F | BODY MASS INDEX: 23.32 KG/M2 | RESPIRATION RATE: 18 BRPM

## 2022-05-16 DIAGNOSIS — G89.3 CANCER ASSOCIATED PAIN: ICD-10-CM

## 2022-05-16 DIAGNOSIS — C50.919 MALIGNANT NEOPLASM OF FEMALE BREAST, UNSPECIFIED ESTROGEN RECEPTOR STATUS, UNSPECIFIED LATERALITY, UNSPECIFIED SITE OF BREAST: Primary | ICD-10-CM

## 2022-05-16 DIAGNOSIS — I82.A12 ACUTE DEEP VEIN THROMBOSIS (DVT) OF AXILLARY VEIN OF LEFT UPPER EXTREMITY: ICD-10-CM

## 2022-05-16 DIAGNOSIS — K12.31 MUCOSITIS DUE TO ANTINEOPLASTIC THERAPY: ICD-10-CM

## 2022-05-16 LAB
ALBUMIN SERPL-MCNC: 3.3 G/DL (ref 3.5–5.2)
ALBUMIN/GLOB SERPL: 1.1 G/DL
ALP SERPL-CCNC: 80 U/L (ref 39–117)
ALT SERPL W P-5'-P-CCNC: 24 U/L (ref 1–33)
ANION GAP SERPL CALCULATED.3IONS-SCNC: 9 MMOL/L (ref 5–15)
AST SERPL-CCNC: 34 U/L (ref 1–32)
BASOPHILS # BLD AUTO: 0.02 10*3/MM3 (ref 0–0.2)
BASOPHILS NFR BLD AUTO: 0.7 % (ref 0–1.5)
BILIRUB SERPL-MCNC: 0.3 MG/DL (ref 0–1.2)
BUN SERPL-MCNC: 10 MG/DL (ref 8–23)
BUN/CREAT SERPL: 15.9 (ref 7–25)
CALCIUM SPEC-SCNC: 9.6 MG/DL (ref 8.6–10.5)
CHLORIDE SERPL-SCNC: 105 MMOL/L (ref 98–107)
CO2 SERPL-SCNC: 27 MMOL/L (ref 22–29)
CREAT SERPL-MCNC: 0.63 MG/DL (ref 0.57–1)
DEPRECATED RDW RBC AUTO: 43.2 FL (ref 37–54)
EGFRCR SERPLBLD CKD-EPI 2021: 99.2 ML/MIN/1.73
EOSINOPHIL # BLD AUTO: 0.08 10*3/MM3 (ref 0–0.4)
EOSINOPHIL NFR BLD AUTO: 2.6 % (ref 0.3–6.2)
ERYTHROCYTE [DISTWIDTH] IN BLOOD BY AUTOMATED COUNT: 13.9 % (ref 12.3–15.4)
GLOBULIN UR ELPH-MCNC: 3 GM/DL
GLUCOSE SERPL-MCNC: 109 MG/DL (ref 65–99)
HCT VFR BLD AUTO: 27.5 % (ref 34–46.6)
HGB BLD-MCNC: 8.9 G/DL (ref 12–15.9)
HOLD SPECIMEN: NORMAL
IMM GRANULOCYTES # BLD AUTO: 0.02 10*3/MM3 (ref 0–0.05)
IMM GRANULOCYTES NFR BLD AUTO: 0.7 % (ref 0–0.5)
LYMPHOCYTES # BLD AUTO: 0.66 10*3/MM3 (ref 0.7–3.1)
LYMPHOCYTES NFR BLD AUTO: 21.6 % (ref 19.6–45.3)
MCH RBC QN AUTO: 28.7 PG (ref 26.6–33)
MCHC RBC AUTO-ENTMCNC: 32.4 G/DL (ref 31.5–35.7)
MCV RBC AUTO: 88.7 FL (ref 79–97)
MONOCYTES # BLD AUTO: 0.36 10*3/MM3 (ref 0.1–0.9)
MONOCYTES NFR BLD AUTO: 11.8 % (ref 5–12)
NEUTROPHILS NFR BLD AUTO: 1.91 10*3/MM3 (ref 1.7–7)
NEUTROPHILS NFR BLD AUTO: 62.6 % (ref 42.7–76)
NRBC BLD AUTO-RTO: 0 /100 WBC (ref 0–0.2)
PLATELET # BLD AUTO: 154 10*3/MM3 (ref 140–450)
PMV BLD AUTO: 10.4 FL (ref 6–12)
POTASSIUM SERPL-SCNC: 3.9 MMOL/L (ref 3.5–5.2)
PROT SERPL-MCNC: 6.3 G/DL (ref 6–8.5)
RBC # BLD AUTO: 3.1 10*6/MM3 (ref 3.77–5.28)
SODIUM SERPL-SCNC: 141 MMOL/L (ref 136–145)
WBC NRBC COR # BLD: 3.05 10*3/MM3 (ref 3.4–10.8)

## 2022-05-16 PROCEDURE — 80053 COMPREHEN METABOLIC PANEL: CPT

## 2022-05-16 PROCEDURE — 96417 CHEMO IV INFUS EACH ADDL SEQ: CPT | Performed by: INTERNAL MEDICINE

## 2022-05-16 PROCEDURE — 99214 OFFICE O/P EST MOD 30 MIN: CPT | Performed by: INTERNAL MEDICINE

## 2022-05-16 PROCEDURE — 85025 COMPLETE CBC W/AUTO DIFF WBC: CPT

## 2022-05-16 PROCEDURE — 25010000002 CARBOPLATIN PER 50 MG: Performed by: INTERNAL MEDICINE

## 2022-05-16 PROCEDURE — 25010000002 PALONOSETRON PER 25 MCG: Performed by: INTERNAL MEDICINE

## 2022-05-16 PROCEDURE — 36415 COLL VENOUS BLD VENIPUNCTURE: CPT

## 2022-05-16 PROCEDURE — 25010000002 DIPHENHYDRAMINE PER 50 MG: Performed by: INTERNAL MEDICINE

## 2022-05-16 PROCEDURE — 96375 TX/PRO/DX INJ NEW DRUG ADDON: CPT | Performed by: INTERNAL MEDICINE

## 2022-05-16 PROCEDURE — 96413 CHEMO IV INFUSION 1 HR: CPT | Performed by: INTERNAL MEDICINE

## 2022-05-16 PROCEDURE — 25010000002 DEXAMETHASONE SODIUM PHOSPHATE 100 MG/10ML SOLUTION: Performed by: INTERNAL MEDICINE

## 2022-05-16 PROCEDURE — 25010000002 PACLITAXEL PER 1 MG: Performed by: INTERNAL MEDICINE

## 2022-05-16 RX ORDER — FAMOTIDINE 10 MG/ML
20 INJECTION, SOLUTION INTRAVENOUS ONCE
Status: CANCELLED | OUTPATIENT
Start: 2022-05-16

## 2022-05-16 RX ORDER — FAMOTIDINE 10 MG/ML
20 INJECTION, SOLUTION INTRAVENOUS ONCE
Status: COMPLETED | OUTPATIENT
Start: 2022-05-16 | End: 2022-05-16

## 2022-05-16 RX ORDER — PALONOSETRON 0.05 MG/ML
0.25 INJECTION, SOLUTION INTRAVENOUS ONCE
Status: CANCELLED | OUTPATIENT
Start: 2022-05-16

## 2022-05-16 RX ORDER — SODIUM CHLORIDE 9 MG/ML
250 INJECTION, SOLUTION INTRAVENOUS ONCE
Status: COMPLETED | OUTPATIENT
Start: 2022-05-16 | End: 2022-05-16

## 2022-05-16 RX ORDER — PALONOSETRON 0.05 MG/ML
0.25 INJECTION, SOLUTION INTRAVENOUS ONCE
Status: COMPLETED | OUTPATIENT
Start: 2022-05-16 | End: 2022-05-16

## 2022-05-16 RX ORDER — DIPHENHYDRAMINE HYDROCHLORIDE 50 MG/ML
50 INJECTION INTRAMUSCULAR; INTRAVENOUS AS NEEDED
Status: DISCONTINUED | OUTPATIENT
Start: 2022-05-16 | End: 2022-05-16 | Stop reason: HOSPADM

## 2022-05-16 RX ORDER — HYDROCODONE BITARTRATE AND ACETAMINOPHEN 5; 325 MG/1; MG/1
1 TABLET ORAL EVERY 6 HOURS PRN
Qty: 90 TABLET | Refills: 0 | Status: SHIPPED | OUTPATIENT
Start: 2022-05-16

## 2022-05-16 RX ORDER — DIPHENHYDRAMINE HYDROCHLORIDE 50 MG/ML
50 INJECTION INTRAMUSCULAR; INTRAVENOUS AS NEEDED
Status: CANCELLED | OUTPATIENT
Start: 2022-05-16

## 2022-05-16 RX ORDER — DIPHENHYDRAMINE HYDROCHLORIDE AND LIDOCAINE HYDROCHLORIDE AND ALUMINUM HYDROXIDE AND MAGNESIUM HYDRO
5 KIT EVERY 6 HOURS
Qty: 237 ML | Refills: 0 | OUTPATIENT
Start: 2022-05-16

## 2022-05-16 RX ORDER — FAMOTIDINE 10 MG/ML
20 INJECTION, SOLUTION INTRAVENOUS AS NEEDED
Status: DISCONTINUED | OUTPATIENT
Start: 2022-05-16 | End: 2022-05-16 | Stop reason: HOSPADM

## 2022-05-16 RX ORDER — FAMOTIDINE 10 MG/ML
20 INJECTION, SOLUTION INTRAVENOUS AS NEEDED
Status: CANCELLED | OUTPATIENT
Start: 2022-05-16

## 2022-05-16 RX ORDER — SODIUM CHLORIDE 9 MG/ML
250 INJECTION, SOLUTION INTRAVENOUS ONCE
Status: CANCELLED | OUTPATIENT
Start: 2022-05-16

## 2022-05-16 RX ADMIN — DIPHENHYDRAMINE HYDROCHLORIDE 50 MG: 50 INJECTION, SOLUTION INTRAMUSCULAR; INTRAVENOUS at 11:07

## 2022-05-16 RX ADMIN — PALONOSETRON HYDROCHLORIDE 0.25 MG: 0.25 INJECTION, SOLUTION INTRAVENOUS at 10:53

## 2022-05-16 RX ADMIN — DEXAMETHASONE SODIUM PHOSPHATE 12 MG: 10 INJECTION, SOLUTION INTRAMUSCULAR; INTRAVENOUS at 11:36

## 2022-05-16 RX ADMIN — PACLITAXEL 130 MG: 6 INJECTION, SOLUTION INTRAVENOUS at 12:12

## 2022-05-16 RX ADMIN — CARBOPLATIN 160 MG: 10 INJECTION, SOLUTION INTRAVENOUS at 13:28

## 2022-05-16 RX ADMIN — FAMOTIDINE 20 MG: 10 INJECTION INTRAVENOUS at 10:58

## 2022-05-16 RX ADMIN — SODIUM CHLORIDE 250 ML: 9 INJECTION, SOLUTION INTRAVENOUS at 10:53

## 2022-05-17 ENCOUNTER — HOSPITAL ENCOUNTER (OUTPATIENT)
Dept: ULTRASOUND IMAGING | Facility: HOSPITAL | Age: 62
Discharge: HOME | End: 2022-05-17

## 2022-05-17 ENCOUNTER — HOSPITAL ENCOUNTER (OUTPATIENT)
Dept: INTERVENTIONAL RADIOLOGY/VASCULAR | Facility: HOSPITAL | Age: 62
Discharge: HOME | End: 2022-05-17

## 2022-05-17 VITALS
HEART RATE: 94 BPM | TEMPERATURE: 97.5 F | OXYGEN SATURATION: 95 % | BODY MASS INDEX: 23.29 KG/M2 | SYSTOLIC BLOOD PRESSURE: 136 MMHG | RESPIRATION RATE: 18 BRPM | HEIGHT: 62 IN | WEIGHT: 126.54 LBS | DIASTOLIC BLOOD PRESSURE: 63 MMHG

## 2022-05-17 DIAGNOSIS — C50.919 MALIGNANT NEOPLASM OF FEMALE BREAST, UNSPECIFIED ESTROGEN RECEPTOR STATUS, UNSPECIFIED LATERALITY, UNSPECIFIED SITE OF BREAST: ICD-10-CM

## 2022-05-17 PROBLEM — K12.31 MUCOSITIS DUE TO ANTINEOPLASTIC THERAPY: Status: ACTIVE | Noted: 2022-05-17

## 2022-05-17 PROCEDURE — 25010000002 MIDAZOLAM PER 1 MG: Performed by: RADIOLOGY

## 2022-05-17 PROCEDURE — C1788 PORT, INDWELLING, IMP: HCPCS

## 2022-05-17 PROCEDURE — 77001 FLUOROGUIDE FOR VEIN DEVICE: CPT

## 2022-05-17 PROCEDURE — 25010000002 FENTANYL CITRATE (PF) 50 MCG/ML SOLUTION: Performed by: RADIOLOGY

## 2022-05-17 PROCEDURE — 76937 US GUIDE VASCULAR ACCESS: CPT

## 2022-05-17 PROCEDURE — 25010000002 HEPARIN LOCK FLUSH PER 10 UNITS: Performed by: RADIOLOGY

## 2022-05-17 PROCEDURE — C1894 INTRO/SHEATH, NON-LASER: HCPCS

## 2022-05-17 RX ORDER — FENTANYL CITRATE 50 UG/ML
INJECTION, SOLUTION INTRAMUSCULAR; INTRAVENOUS
Status: COMPLETED | OUTPATIENT
Start: 2022-05-17 | End: 2022-05-17

## 2022-05-17 RX ORDER — HEPARIN SODIUM (PORCINE) LOCK FLUSH IV SOLN 100 UNIT/ML 100 UNIT/ML
SOLUTION INTRAVENOUS
Status: COMPLETED | OUTPATIENT
Start: 2022-05-17 | End: 2022-05-17

## 2022-05-17 RX ORDER — LIDOCAINE HYDROCHLORIDE AND EPINEPHRINE 10; 10 MG/ML; UG/ML
INJECTION, SOLUTION INFILTRATION; PERINEURAL
Status: DISPENSED
Start: 2022-05-17 | End: 2022-05-17

## 2022-05-17 RX ORDER — FENTANYL CITRATE 50 UG/ML
INJECTION, SOLUTION INTRAMUSCULAR; INTRAVENOUS
Status: DISPENSED
Start: 2022-05-17 | End: 2022-05-17

## 2022-05-17 RX ORDER — MIDAZOLAM HYDROCHLORIDE 1 MG/ML
INJECTION INTRAMUSCULAR; INTRAVENOUS
Status: DISPENSED
Start: 2022-05-17 | End: 2022-05-17

## 2022-05-17 RX ORDER — MIDAZOLAM HYDROCHLORIDE 1 MG/ML
INJECTION INTRAMUSCULAR; INTRAVENOUS
Status: COMPLETED | OUTPATIENT
Start: 2022-05-17 | End: 2022-05-17

## 2022-05-17 RX ORDER — HEPARIN SODIUM (PORCINE) LOCK FLUSH IV SOLN 100 UNIT/ML 100 UNIT/ML
SOLUTION INTRAVENOUS
Status: DISPENSED
Start: 2022-05-17 | End: 2022-05-17

## 2022-05-17 RX ORDER — LIDOCAINE HYDROCHLORIDE AND EPINEPHRINE 10; 10 MG/ML; UG/ML
INJECTION, SOLUTION INFILTRATION; PERINEURAL
Status: COMPLETED | OUTPATIENT
Start: 2022-05-17 | End: 2022-05-17

## 2022-05-17 RX ADMIN — MIDAZOLAM HYDROCHLORIDE 0.5 MG: 1 INJECTION, SOLUTION INTRAMUSCULAR; INTRAVENOUS at 12:08

## 2022-05-17 RX ADMIN — HEPARIN 500 UNITS: 100 SYRINGE at 12:36

## 2022-05-17 RX ADMIN — FENTANYL CITRATE 25 MCG: 50 INJECTION INTRAMUSCULAR; INTRAVENOUS at 12:07

## 2022-05-17 RX ADMIN — LIDOCAINE HYDROCHLORIDE AND EPINEPHRINE 10 ML: 10; 10 INJECTION, SOLUTION INFILTRATION; PERINEURAL at 12:07

## 2022-05-17 RX ADMIN — LIDOCAINE HYDROCHLORIDE AND EPINEPHRINE 10 ML: 10; 10 INJECTION, SOLUTION INFILTRATION; PERINEURAL at 12:14

## 2022-05-17 NOTE — PROGRESS NOTES
Subjective     Radha Duarte was seen in follow-up for metastatic breast cancer.  She is overall stable since last visit.  Her  unfortunately recently passed away from metastatic gallbladder cancer.  She is grieving appropriately.  I offered her my condolences.  She is struggling with some chemotherapy-induced nausea emesis 2-3 episodes in a week.  These have been well controlled on antinausea medication.  Denies any numbness or tingling in the extremities.  Mucositis from chemotherapy has been stable on Magic mouthwash.  She is requesting new prescription.  Her overall pain has been well controlled on pain medication.          HEM/ONC HISTORY:  - 11/2021initially presented with right chest wall mass. Mammogram at the time was normal.  - 2/15/2022 CT neck showed a large, 5 cm, mass within the right retropectoral region which may be invading the anterior chest wall.  - 2/22/2022 CT chest showed Large necrotic right axillary/retropectoral lymphadenopathy, likely malignancy, Lymphadenopathy also present within the mediastinum, Subtle interstitial opacities suspected throughout both lungs.   - 2/22/2022 right chest lymph node core biopsy showed Poorly differentiated carcinoma. the immunostains panel is suggestive of metastatic mammary carcinoma. ER-[place]-HER2-. Foundation one sent out on 4/25 with result pending.   - 3/6/2022 established care in oncology clinic. Recommend PET/CT scan and breast MRI  - 3/15/2022 PET/CT scan showed Suspected primary mass in the right subpectoral chest wall with metastatic   lymphadenopathy in the right axillary, right internal mammary, bilateral supraclavicular, and mediastinal regions.  - 3/18/2022 breast MRI: Discontiguous mass and nonmass enhancement predominantly involving the  superior right breast is considered highly suspicious for breast malignancy, Abnormal axillary lymph nodes and abnormal lymph nodes along the right internal mammary chain.  - 3/22/2022 diagnostic  mammogram showed hypoechoic nodule in the 12:00 position of the right breast, this  is located approximately 4 cm from the nipple, this is highly suspicious for malignancy.  - 3/22/2022 right breast mass biopsy showed Very small foci of poorly differentiated carcinoma within angiolymphatic spaces, multifocal (consistent with lymphovascular space invasion). ER-HER2- tissue not adequate to check [place].   - 3/24/2022 refer to radiation oncology for palliative radiation therapy and radiation was delayed. But she was determined NOT a candidate for radiation due to rapid growth of the tumor on 4/26/2022.   - 4/22/2022 start on palliative carboplatin/palictaxel weekly . Also started on eliquis for RUE DVT.     Past Medical History, Past Surgical History, Social History, Family History have been reviewed and are without significant changes except as mentioned.        Medications:  The current medication list was reviewed in the EMR    ALLERGIES:    Allergies   Allergen Reactions   • Percocet [Oxycodone-Acetaminophen] Nausea Only       Objective      Vitals:    05/16/22 0902   BP: 156/71   Pulse: 84   Resp: 18   Temp: 97.8 °F (36.6 °C)   SpO2: 99%   Weight: 57.8 kg (127 lb 8 oz)   PainSc: 0-No pain     No flowsheet data found.    Physical Exam  Vitals and nursing note reviewed.   Constitutional:       Appearance: Normal appearance.   Cardiovascular:      Rate and Rhythm: Normal rate and regular rhythm.   Abdominal:      General: There is no distension.      Palpations: Abdomen is soft. There is no mass.      Tenderness: There is no abdominal tenderness.   Musculoskeletal:      Comments: Right upper EXTR edema with axillary adenopathy proved compared to prior exam   Neurological:      Mental Status: She is alert.   Psychiatric:         Mood and Affect: Mood normal.         Behavior: Behavior normal.         Thought Content: Thought content normal.           RECENT LABS:Independently reviewed and summarized  Hematology WBC   Date  Value Ref Range Status   05/16/2022 3.05 (L) 3.40 - 10.80 10*3/mm3 Final     RBC   Date Value Ref Range Status   05/16/2022 3.10 (L) 3.77 - 5.28 10*6/mm3 Final     Hemoglobin   Date Value Ref Range Status   05/16/2022 8.9 (L) 12.0 - 15.9 g/dL Final     Hematocrit   Date Value Ref Range Status   05/16/2022 27.5 (L) 34.0 - 46.6 % Final     Platelets   Date Value Ref Range Status   05/16/2022 154 140 - 450 10*3/mm3 Final                Lab Results   Component Value Date    GLUCOSE 109 (H) 05/16/2022    BUN 10 05/16/2022    CREATININE 0.63 05/16/2022    BCR 15.9 05/16/2022    K 3.9 05/16/2022    CO2 27.0 05/16/2022    CALCIUM 9.6 05/16/2022    ALBUMIN 3.30 (L) 05/16/2022    AST 34 (H) 05/16/2022    ALT 24 05/16/2022       Radha Duarte reports a pain score of 0.  Given her pain assessment as noted, treatment options were discussed and the following options were decided upon as a follow-up plan to address the patient's pain: continuation of current treatment plan for pain.    Patient screened negative for depression based on a PHQ-9 score of 0 on 5/16/2022.     Advance Care Planning   ACP discussion was declined by the patient. Patient has an advance directive in EMR which is still valid.           Diagnosis:   (1) Metastatic breast cancer with distant LN (mediastinal) metastases   Triple negative   Unable to do perform foundation one due to limited tissue     Current therapy:   Weekly carboplatin/paclitaxel     (2) Cancer associated pain   (3) Right upper extremity DVT   (4) Mucositis   (5) Unintentional weight loss       Assessment & Plan       (1) Metastatic breast cancer with distant LN (mediastinal) metastases       Chronic, stable.  Patient has received 3 doses of weekly carboplatin paclitaxel at [facility].  She has been referred to me to consider palliative chemotherapy locally close to home.  She is feeling overall well.  Grade 2 chemotherapy-induced nausea/emesis which is well controlled on antinausea  medication.  Overall fatigue has been stable.  Denies any peripheral neuropathy.  No fever or chills.  Mucositis from chemotherapy has been stable on Magic mouthwash.  Her labs look stable.  First cycle of carboplatin and paclitaxel was signed on today's visit.    Discussed with patient that ideally I would like to have chemotherapy port placed for venous access.  Risk versus benefit discussed.  She understood and was agreeable.  I will send referral to IR for chemotherapy port placement.      I will plan to see her back in 1 week with CBC, CMP prior to next chemotherapy cycle.    (2) Cancer associated pain     Chronic, stable.  Patient currently taking MS Contin 30 mg every 12 hour along with hydrocodone 5 mg intermittently for breakthrough pain.  Her pain has been well controlled.  She is requesting new prescription of hydrocodone which was sent to the pharmacy.    (3) Right upper extremity DVT     Chronic, stable.  Suspect right upper extremity DVT secondary to hypercoagulable state from malignancy.  She remains on Eliquis without any new concern for bleeding or thrombosis.    (4) Mucositis     Chronic, stable.  Mucositis likely secondary to chemotherapy.  New prescription of Magic mouthwash was sent to the pharmacy.    (5) Unintentional weight loss     Likely secondary to cancer as well as treatment related.  Nutrition consult.  Recommend high-calorie high-protein diet.  Recommend small frequent meals.    5/17/2022      CC:

## 2022-05-17 NOTE — PRE-PROCEDURE NOTE
INTERVENTIONAL RADIOLOGY    61yo F PMH anemia, HLD, osteoporosis and poorly differentiated triple negative mammary carcinoma stage IV (mediastinal mets) on palliative chemotherapy with carboplatin/paclitaxel, referred to IR for durable central venous access for chemotherapy. Notably, she has extensive RUE DVT (on Eliquis) and right sided breast ca.    On exam, erythema and edema RUE and right chest wall. Right breast deformity secondary to malignancy. Left chest wall without erythema. No chest wall scars or implants. No chest wall collaterals  ASA 4, Mallampati 1    Plan left IJ chest port.    The patient's history and physical exam were reviewed, and no changes were noted. The risks, benefits, alternatives, and indications of the procedure and moderate sedation were discussed with the patient, and all questions were answered. Informed consent was obtained.    Thank you very much for the referral. Please do not hesitate to contact me if I may be of further assistance.    Lissett Sneed M.D.  Vascular, Interventional and Wound Physician  IR Chief, [facility]  Cell: [phone] / Office: [phone]

## 2022-05-17 NOTE — BRIEF OP NOTE
INTERVENTIONAL RADIOLOGY: BRIEF OP NOTE    Pre-Procedure Diagnosis: triple negative poorly differentiated right mammary carcinoma  Post-Procedure Diagnosis: same  Procedure: Left IJ Chest Port Placement  Anesthesia: moderate sedation and local  Staff: Lissett Sneed M.D.  EBL: <1mL  Specimens: none  Drains: none  Findings: Patent small left IJ. Port aspirates and flushes easily. Heparinized.  Complications: none    Port is ready for use.    Lissett Sneed M.D.  Vascular, Interventional and Wound Physician  IR Chief, [facility]  Cell: [phone] / Office: [phone]

## 2022-05-18 DIAGNOSIS — G89.3 CANCER ASSOCIATED PAIN: ICD-10-CM

## 2022-05-18 RX ORDER — APIXABAN 5 MG/1
5 TABLET, FILM COATED ORAL EVERY 12 HOURS SCHEDULED
Qty: 60 TABLET | Refills: 3 | Status: SHIPPED | OUTPATIENT
Start: 2022-05-18 | End: 2022-07-22 | Stop reason: SDUPTHER

## 2022-05-18 RX ORDER — MORPHINE SULFATE 30 MG/1
60 TABLET, FILM COATED, EXTENDED RELEASE ORAL 2 TIMES DAILY
Qty: 60 TABLET | Refills: 0 | Status: SHIPPED | OUTPATIENT
Start: 2022-05-18 | End: 2022-06-01 | Stop reason: SDUPTHER

## 2022-05-18 NOTE — TELEPHONE ENCOUNTER
Incoming Refill Request      Medication requested (name and dose): eliquis/morphine    Pharmacy where request should be sent: [facility] pharmacy    Additional details provided by patient: patient has enough that she will run out Saturday/Sunday    Best call back number: [phone]    Does the patient have less than a 3 day supply:  [] Yes  [x] No    Breonna Khan  05/18/22, 16:01 CDT

## 2022-05-18 NOTE — TELEPHONE ENCOUNTER
Rx Refill Note  Requested Prescriptions     Pending Prescriptions Disp Refills   • Eliquis 5 MG tablet tablet 60 tablet    • Morphine (MS CONTIN) 30 MG 12 hr tablet 60 tablet 0     Sig: Take 2 tablets by mouth 2 (Two) Times a Day.      Last office visit with prescribing clinician: 5/16/2022      Next office visit with prescribing clinician: 5/24/2022            Juliet Smith RN  05/18/22, 16:11 CDT

## 2022-05-24 ENCOUNTER — DOCUMENTATION (OUTPATIENT)
Dept: ONCOLOGY | Facility: HOSPITAL | Age: 62
End: 2022-05-24

## 2022-05-24 ENCOUNTER — INFUSION (OUTPATIENT)
Dept: ONCOLOGY | Facility: HOSPITAL | Age: 62
End: 2022-05-24

## 2022-05-24 ENCOUNTER — OFFICE VISIT (OUTPATIENT)
Dept: ONCOLOGY | Facility: CLINIC | Age: 62
End: 2022-05-24

## 2022-05-24 VITALS
DIASTOLIC BLOOD PRESSURE: 69 MMHG | BODY MASS INDEX: 22.68 KG/M2 | SYSTOLIC BLOOD PRESSURE: 133 MMHG | OXYGEN SATURATION: 91 % | HEART RATE: 92 BPM | RESPIRATION RATE: 18 BRPM | WEIGHT: 124 LBS | TEMPERATURE: 97.2 F

## 2022-05-24 DIAGNOSIS — I82.A12 ACUTE DEEP VEIN THROMBOSIS (DVT) OF AXILLARY VEIN OF LEFT UPPER EXTREMITY: ICD-10-CM

## 2022-05-24 DIAGNOSIS — G89.3 CANCER ASSOCIATED PAIN: ICD-10-CM

## 2022-05-24 DIAGNOSIS — L27.1 HAND FOOT SYNDROME: ICD-10-CM

## 2022-05-24 DIAGNOSIS — K12.31 MUCOSITIS DUE TO ANTINEOPLASTIC THERAPY: ICD-10-CM

## 2022-05-24 DIAGNOSIS — C50.919 MALIGNANT NEOPLASM OF FEMALE BREAST, UNSPECIFIED ESTROGEN RECEPTOR STATUS, UNSPECIFIED LATERALITY, UNSPECIFIED SITE OF BREAST: Primary | ICD-10-CM

## 2022-05-24 LAB
ALBUMIN SERPL-MCNC: 3.6 G/DL (ref 3.5–5.2)
ALBUMIN/GLOB SERPL: 1.3 G/DL
ALP SERPL-CCNC: 74 U/L (ref 39–117)
ALT SERPL W P-5'-P-CCNC: 13 U/L (ref 1–33)
ANION GAP SERPL CALCULATED.3IONS-SCNC: 10 MMOL/L (ref 5–15)
AST SERPL-CCNC: 21 U/L (ref 1–32)
BASOPHILS # BLD AUTO: 0.03 10*3/MM3 (ref 0–0.2)
BASOPHILS NFR BLD AUTO: 0.7 % (ref 0–1.5)
BILIRUB SERPL-MCNC: 0.3 MG/DL (ref 0–1.2)
BUN SERPL-MCNC: 9 MG/DL (ref 8–23)
BUN/CREAT SERPL: 14.3 (ref 7–25)
CALCIUM SPEC-SCNC: 9.2 MG/DL (ref 8.6–10.5)
CHLORIDE SERPL-SCNC: 106 MMOL/L (ref 98–107)
CO2 SERPL-SCNC: 24 MMOL/L (ref 22–29)
CREAT SERPL-MCNC: 0.63 MG/DL (ref 0.57–1)
DEPRECATED RDW RBC AUTO: 45.2 FL (ref 37–54)
EGFRCR SERPLBLD CKD-EPI 2021: 99.2 ML/MIN/1.73
EOSINOPHIL # BLD AUTO: 0.05 10*3/MM3 (ref 0–0.4)
EOSINOPHIL NFR BLD AUTO: 1.1 % (ref 0.3–6.2)
ERYTHROCYTE [DISTWIDTH] IN BLOOD BY AUTOMATED COUNT: 15.1 % (ref 12.3–15.4)
GLOBULIN UR ELPH-MCNC: 2.8 GM/DL
GLUCOSE SERPL-MCNC: 140 MG/DL (ref 65–99)
HCT VFR BLD AUTO: 25.9 % (ref 34–46.6)
HGB BLD-MCNC: 8.9 G/DL (ref 12–15.9)
HOLD SPECIMEN: NORMAL
IMM GRANULOCYTES # BLD AUTO: 0.02 10*3/MM3 (ref 0–0.05)
IMM GRANULOCYTES NFR BLD AUTO: 0.4 % (ref 0–0.5)
LYMPHOCYTES # BLD AUTO: 0.92 10*3/MM3 (ref 0.7–3.1)
LYMPHOCYTES NFR BLD AUTO: 20 % (ref 19.6–45.3)
MCH RBC QN AUTO: 30.6 PG (ref 26.6–33)
MCHC RBC AUTO-ENTMCNC: 34.4 G/DL (ref 31.5–35.7)
MCV RBC AUTO: 89 FL (ref 79–97)
MONOCYTES # BLD AUTO: 0.38 10*3/MM3 (ref 0.1–0.9)
MONOCYTES NFR BLD AUTO: 8.3 % (ref 5–12)
NEUTROPHILS NFR BLD AUTO: 3.19 10*3/MM3 (ref 1.7–7)
NEUTROPHILS NFR BLD AUTO: 69.5 % (ref 42.7–76)
NRBC BLD AUTO-RTO: 0 /100 WBC (ref 0–0.2)
PLATELET # BLD AUTO: 183 10*3/MM3 (ref 140–450)
PMV BLD AUTO: 10.4 FL (ref 6–12)
POTASSIUM SERPL-SCNC: 3.7 MMOL/L (ref 3.5–5.2)
PROT SERPL-MCNC: 6.4 G/DL (ref 6–8.5)
RBC # BLD AUTO: 2.91 10*6/MM3 (ref 3.77–5.28)
SODIUM SERPL-SCNC: 140 MMOL/L (ref 136–145)
WBC NRBC COR # BLD: 4.59 10*3/MM3 (ref 3.4–10.8)

## 2022-05-24 PROCEDURE — 25010000002 PALONOSETRON PER 25 MCG: Performed by: INTERNAL MEDICINE

## 2022-05-24 PROCEDURE — 85025 COMPLETE CBC W/AUTO DIFF WBC: CPT

## 2022-05-24 PROCEDURE — 25010000002 HEPARIN LOCK FLUSH PER 10 UNITS: Performed by: INTERNAL MEDICINE

## 2022-05-24 PROCEDURE — 25010000002 DEXAMETHASONE SODIUM PHOSPHATE 100 MG/10ML SOLUTION: Performed by: INTERNAL MEDICINE

## 2022-05-24 PROCEDURE — 96417 CHEMO IV INFUS EACH ADDL SEQ: CPT | Performed by: INTERNAL MEDICINE

## 2022-05-24 PROCEDURE — 96413 CHEMO IV INFUSION 1 HR: CPT | Performed by: INTERNAL MEDICINE

## 2022-05-24 PROCEDURE — 96375 TX/PRO/DX INJ NEW DRUG ADDON: CPT | Performed by: INTERNAL MEDICINE

## 2022-05-24 PROCEDURE — 25010000002 CARBOPLATIN PER 50 MG: Performed by: INTERNAL MEDICINE

## 2022-05-24 PROCEDURE — 25010000002 PACLITAXEL PER 1 MG: Performed by: INTERNAL MEDICINE

## 2022-05-24 PROCEDURE — 99214 OFFICE O/P EST MOD 30 MIN: CPT | Performed by: INTERNAL MEDICINE

## 2022-05-24 PROCEDURE — 80053 COMPREHEN METABOLIC PANEL: CPT

## 2022-05-24 PROCEDURE — 25010000002 DIPHENHYDRAMINE PER 50 MG: Performed by: INTERNAL MEDICINE

## 2022-05-24 RX ORDER — SODIUM CHLORIDE 9 MG/ML
250 INJECTION, SOLUTION INTRAVENOUS ONCE
Status: COMPLETED | OUTPATIENT
Start: 2022-05-24 | End: 2022-05-24

## 2022-05-24 RX ORDER — PALONOSETRON 0.05 MG/ML
0.25 INJECTION, SOLUTION INTRAVENOUS ONCE
Status: CANCELLED | OUTPATIENT
Start: 2022-05-24

## 2022-05-24 RX ORDER — SODIUM CHLORIDE 0.9 % (FLUSH) 0.9 %
10 SYRINGE (ML) INJECTION AS NEEDED
Status: CANCELLED | OUTPATIENT
Start: 2022-05-24

## 2022-05-24 RX ORDER — FAMOTIDINE 10 MG/ML
20 INJECTION, SOLUTION INTRAVENOUS ONCE
Status: CANCELLED | OUTPATIENT
Start: 2022-05-24

## 2022-05-24 RX ORDER — DIPHENHYDRAMINE HYDROCHLORIDE 50 MG/ML
50 INJECTION INTRAMUSCULAR; INTRAVENOUS AS NEEDED
Status: CANCELLED | OUTPATIENT
Start: 2022-06-01

## 2022-05-24 RX ORDER — PALONOSETRON 0.05 MG/ML
0.25 INJECTION, SOLUTION INTRAVENOUS ONCE
Status: CANCELLED | OUTPATIENT
Start: 2022-06-01

## 2022-05-24 RX ORDER — FAMOTIDINE 10 MG/ML
20 INJECTION, SOLUTION INTRAVENOUS AS NEEDED
Status: CANCELLED | OUTPATIENT
Start: 2022-06-01

## 2022-05-24 RX ORDER — SODIUM CHLORIDE 9 MG/ML
250 INJECTION, SOLUTION INTRAVENOUS ONCE
Status: CANCELLED | OUTPATIENT
Start: 2022-06-01

## 2022-05-24 RX ORDER — DIPHENHYDRAMINE HYDROCHLORIDE 50 MG/ML
50 INJECTION INTRAMUSCULAR; INTRAVENOUS AS NEEDED
Status: DISCONTINUED | OUTPATIENT
Start: 2022-05-24 | End: 2022-05-24 | Stop reason: HOSPADM

## 2022-05-24 RX ORDER — HEPARIN SODIUM (PORCINE) LOCK FLUSH IV SOLN 100 UNIT/ML 100 UNIT/ML
500 SOLUTION INTRAVENOUS AS NEEDED
Status: DISCONTINUED | OUTPATIENT
Start: 2022-05-24 | End: 2022-05-24 | Stop reason: HOSPADM

## 2022-05-24 RX ORDER — HEPARIN SODIUM (PORCINE) LOCK FLUSH IV SOLN 100 UNIT/ML 100 UNIT/ML
500 SOLUTION INTRAVENOUS AS NEEDED
Status: CANCELLED | OUTPATIENT
Start: 2022-06-01

## 2022-05-24 RX ORDER — FAMOTIDINE 10 MG/ML
20 INJECTION, SOLUTION INTRAVENOUS AS NEEDED
Status: CANCELLED | OUTPATIENT
Start: 2022-05-24

## 2022-05-24 RX ORDER — SODIUM CHLORIDE 9 MG/ML
250 INJECTION, SOLUTION INTRAVENOUS ONCE
Status: CANCELLED | OUTPATIENT
Start: 2022-05-24

## 2022-05-24 RX ORDER — FAMOTIDINE 10 MG/ML
20 INJECTION, SOLUTION INTRAVENOUS AS NEEDED
Status: DISCONTINUED | OUTPATIENT
Start: 2022-05-24 | End: 2022-05-24 | Stop reason: HOSPADM

## 2022-05-24 RX ORDER — FAMOTIDINE 10 MG/ML
20 INJECTION, SOLUTION INTRAVENOUS ONCE
Status: CANCELLED | OUTPATIENT
Start: 2022-06-01

## 2022-05-24 RX ORDER — FAMOTIDINE 10 MG/ML
20 INJECTION, SOLUTION INTRAVENOUS ONCE
Status: COMPLETED | OUTPATIENT
Start: 2022-05-24 | End: 2022-05-24

## 2022-05-24 RX ORDER — PALONOSETRON 0.05 MG/ML
0.25 INJECTION, SOLUTION INTRAVENOUS ONCE
Status: COMPLETED | OUTPATIENT
Start: 2022-05-24 | End: 2022-05-24

## 2022-05-24 RX ORDER — DIPHENHYDRAMINE HYDROCHLORIDE 50 MG/ML
50 INJECTION INTRAMUSCULAR; INTRAVENOUS AS NEEDED
Status: CANCELLED | OUTPATIENT
Start: 2022-05-24

## 2022-05-24 RX ORDER — SODIUM CHLORIDE 0.9 % (FLUSH) 0.9 %
10 SYRINGE (ML) INJECTION AS NEEDED
Status: DISCONTINUED | OUTPATIENT
Start: 2022-05-24 | End: 2022-05-24 | Stop reason: HOSPADM

## 2022-05-24 RX ADMIN — DEXAMETHASONE SODIUM PHOSPHATE 12 MG: 10 INJECTION, SOLUTION INTRAMUSCULAR; INTRAVENOUS at 10:19

## 2022-05-24 RX ADMIN — SODIUM CHLORIDE 25 MG: 9 INJECTION, SOLUTION INTRAVENOUS at 10:45

## 2022-05-24 RX ADMIN — PACLITAXEL 125 MG: 6 INJECTION, SOLUTION INTRAVENOUS at 11:17

## 2022-05-24 RX ADMIN — Medication 10 ML: at 13:16

## 2022-05-24 RX ADMIN — PALONOSETRON HYDROCHLORIDE 0.25 MG: 0.25 INJECTION, SOLUTION INTRAVENOUS at 09:59

## 2022-05-24 RX ADMIN — SODIUM CHLORIDE 250 ML: 9 INJECTION, SOLUTION INTRAVENOUS at 09:59

## 2022-05-24 RX ADMIN — FAMOTIDINE 20 MG: 10 INJECTION INTRAVENOUS at 10:06

## 2022-05-24 RX ADMIN — CARBOPLATIN 160 MG: 10 INJECTION, SOLUTION INTRAVENOUS at 12:26

## 2022-05-24 RX ADMIN — HEPARIN 500 UNITS: 100 SYRINGE at 13:16

## 2022-05-25 RX ORDER — POTASSIUM CHLORIDE 20 MEQ/1
20 TABLET, EXTENDED RELEASE ORAL DAILY
Qty: 90 TABLET | Refills: 0 | Status: SHIPPED | OUTPATIENT
Start: 2022-05-25 | End: 2022-07-22 | Stop reason: SDUPTHER

## 2022-05-25 NOTE — TELEPHONE ENCOUNTER
Rx Refill Note  Requested Prescriptions     Pending Prescriptions Disp Refills   • potassium chloride (K-DUR,KLOR-CON) 20 MEQ CR tablet       Sig: Take 1 tablet by mouth Daily.      Last office visit with prescribing clinician: 5/24/2022      Next office visit with prescribing clinician: 6/1/2022            Nasrin Zuniga RN  05/25/22, 11:54 CDT

## 2022-05-29 NOTE — PROGRESS NOTES
Rajendra Duarte was seen in follow up for breast cancer.   Remains on chemotherapy.   Reporting burning and sloughing of skin in upper extremity.   No ulcers or open areas.   No numbness or tingling in extremities.   Nausea stable on antinausea meds.   Appetite is fair.       Past Medical History, Past Surgical History, Social History, Family History have been reviewed and are without significant changes except as mentioned.        Medications:  The current medication list was reviewed in the EMR    ALLERGIES:    Allergies   Allergen Reactions   • Percocet [Oxycodone-Acetaminophen] Nausea Only       Objective      Vitals:    05/24/22 0837   BP: 133/69   Pulse: 92   Resp: 18   Temp: 97.2 °F (36.2 °C)   SpO2: 91%   Weight: 56.2 kg (124 lb)   PainSc: 0-No pain     No flowsheet data found.    Physical Exam  Vitals and nursing note reviewed.   Constitutional:       Appearance: Normal appearance.   Skin:     General: Skin is dry.      Findings: Rash present.      Comments: Desquamation of skin in palms. No ulcers, mild redness of skin.    Neurological:      General: No focal deficit present.      Mental Status: She is alert and oriented to person, place, and time. Mental status is at baseline.   Psychiatric:         Mood and Affect: Mood normal.         Behavior: Behavior normal.         Thought Content: Thought content normal.              RECENT LABS:Independently reviewed and summarized  Hematology WBC   Date Value Ref Range Status   05/24/2022 4.59 3.40 - 10.80 10*3/mm3 Final     RBC   Date Value Ref Range Status   05/24/2022 2.91 (L) 3.77 - 5.28 10*6/mm3 Final     Hemoglobin   Date Value Ref Range Status   05/24/2022 8.9 (L) 12.0 - 15.9 g/dL Final     Hematocrit   Date Value Ref Range Status   05/24/2022 25.9 (L) 34.0 - 46.6 % Final     Platelets   Date Value Ref Range Status   05/24/2022 183 140 - 450 10*3/mm3 Final     Lab Results   Component Value Date    GLUCOSE 140 (H) 05/24/2022    BUN 9  05/24/2022    CREATININE 0.63 05/24/2022    BCR 14.3 05/24/2022    K 3.7 05/24/2022    CO2 24.0 05/24/2022    CALCIUM 9.2 05/24/2022    ALBUMIN 3.60 05/24/2022    AST 21 05/24/2022    ALT 13 05/24/2022            Radha Duarte reports a pain score of 0.  Given her pain assessment as noted, treatment options were discussed and the following options were decided upon as a follow-up plan to address the patient's pain: continuation of current treatment plan for pain.    Patient screened negative for depression based on a PHQ-9 score of 0 on 5/24/2022.      Diagnosis:   (1) Metastatic breast cancer with distant LN (mediastinal) metastases   Triple negative   Unable to do perform foundation one due to limited tissue      Current therapy:   Weekly carboplatin/paclitaxel     (2) Cancer associated pain   (3) Right upper extremity DVT   (4) Mucositis   (5) Unintentional weight loss   (6) HFSR     Assessment & Plan     (1) Metastatic breast cancer with distant LN (mediastinal) metastases     Chronic, stable.  Patient has received 3 doses of weekly carboplatin paclitaxel at [facility].  She us receiving weekly carboplatin and paclitaxel with me locally.   Tissue not sufficiency to foundation one testing.       She is tolerating chemotherapy well without any major side effect.  Grade 2 chemotherapy-induced nausea which is well controlled on antinausea medication.  She does have grade 2 hand-foot skin  reaction from chemotherapy.  I prescribed her evening ointment today.  No peripheral neuropathy.  No fever or chills.  Labs look stable.  Weekly carboplatin and paclitaxel was signed on today's visit.  I will plan to see her back in 1 week with CBC, CMP prior to next chemotherapy cycle.    Clinically she seems to be responding to palliative chemotherapy with improvement in right upper extremity swelling and adenopathy.      (2) Cancer associated pain       Chronic, stable.  Patient currently taking MS Contin 30 mg every 12  hour along with hydrocodone 5 mg intermittently for breakthrough pain.  Her pain has been well controlled.    (3) Right upper extremity DVT     Chronic, stable.  Suspect right upper extremity DVT secondary to hypercoagulable state from malignancy.  She remains on Eliquis.  No new concern for bleeding or thrombosis.  Continue Eliquis with close monitoring.    (4) Mucositis     Chronic, stable.  Mucositis secondary to chemotherapy.  Continue Magic mouthwash as needed.    (5) Unintentional weight loss     Likely secondary to cancer as well as treatment related.  Recommend high-calorie high-protein diet.  Recommend small frequent meals.    (6) HFSR     Grade 2 hand-foot skin reaction from chemotherapy.  Prescription of urea ointment was sent to pharmacy.  We will reevaluate next week.  If worsening will hold chemotherapy.  Counseled about using moisturizer after handwashing.    5/29/2022      CC:

## 2022-06-01 ENCOUNTER — INFUSION (OUTPATIENT)
Dept: ONCOLOGY | Facility: HOSPITAL | Age: 62
End: 2022-06-01

## 2022-06-01 ENCOUNTER — OFFICE VISIT (OUTPATIENT)
Dept: ONCOLOGY | Facility: CLINIC | Age: 62
End: 2022-06-01

## 2022-06-01 VITALS
HEART RATE: 88 BPM | TEMPERATURE: 97.8 F | WEIGHT: 124 LBS | RESPIRATION RATE: 18 BRPM | BODY MASS INDEX: 22.68 KG/M2 | SYSTOLIC BLOOD PRESSURE: 105 MMHG | OXYGEN SATURATION: 97 % | DIASTOLIC BLOOD PRESSURE: 53 MMHG

## 2022-06-01 DIAGNOSIS — G89.3 CANCER ASSOCIATED PAIN: ICD-10-CM

## 2022-06-01 DIAGNOSIS — C50.919 MALIGNANT NEOPLASM OF FEMALE BREAST, UNSPECIFIED ESTROGEN RECEPTOR STATUS, UNSPECIFIED LATERALITY, UNSPECIFIED SITE OF BREAST: Primary | ICD-10-CM

## 2022-06-01 DIAGNOSIS — I82.A12 ACUTE DEEP VEIN THROMBOSIS (DVT) OF AXILLARY VEIN OF LEFT UPPER EXTREMITY: ICD-10-CM

## 2022-06-01 DIAGNOSIS — L27.1 HAND FOOT SYNDROME: ICD-10-CM

## 2022-06-01 LAB
ALBUMIN SERPL-MCNC: 3.4 G/DL (ref 3.5–5.2)
ALBUMIN/GLOB SERPL: 1.3 G/DL
ALP SERPL-CCNC: 59 U/L (ref 39–117)
ALT SERPL W P-5'-P-CCNC: 10 U/L (ref 1–33)
ANION GAP SERPL CALCULATED.3IONS-SCNC: 9 MMOL/L (ref 5–15)
AST SERPL-CCNC: 16 U/L (ref 1–32)
BASOPHILS # BLD AUTO: 0.03 10*3/MM3 (ref 0–0.2)
BASOPHILS NFR BLD AUTO: 0.8 % (ref 0–1.5)
BILIRUB SERPL-MCNC: 0.3 MG/DL (ref 0–1.2)
BUN SERPL-MCNC: 12 MG/DL (ref 8–23)
BUN/CREAT SERPL: 21.8 (ref 7–25)
CALCIUM SPEC-SCNC: 8.8 MG/DL (ref 8.6–10.5)
CHLORIDE SERPL-SCNC: 107 MMOL/L (ref 98–107)
CO2 SERPL-SCNC: 22 MMOL/L (ref 22–29)
CREAT SERPL-MCNC: 0.55 MG/DL (ref 0.57–1)
DEPRECATED RDW RBC AUTO: 49.5 FL (ref 37–54)
EGFRCR SERPLBLD CKD-EPI 2021: 102.5 ML/MIN/1.73
EOSINOPHIL # BLD AUTO: 0.05 10*3/MM3 (ref 0–0.4)
EOSINOPHIL NFR BLD AUTO: 1.3 % (ref 0.3–6.2)
ERYTHROCYTE [DISTWIDTH] IN BLOOD BY AUTOMATED COUNT: 16.3 % (ref 12.3–15.4)
GLOBULIN UR ELPH-MCNC: 2.6 GM/DL
GLUCOSE SERPL-MCNC: 118 MG/DL (ref 65–99)
HCT VFR BLD AUTO: 24 % (ref 34–46.6)
HGB BLD-MCNC: 8.1 G/DL (ref 12–15.9)
HOLD SPECIMEN: NORMAL
IMM GRANULOCYTES # BLD AUTO: 0.04 10*3/MM3 (ref 0–0.05)
IMM GRANULOCYTES NFR BLD AUTO: 1 % (ref 0–0.5)
LYMPHOCYTES # BLD AUTO: 0.97 10*3/MM3 (ref 0.7–3.1)
LYMPHOCYTES NFR BLD AUTO: 24.6 % (ref 19.6–45.3)
MCH RBC QN AUTO: 29.8 PG (ref 26.6–33)
MCHC RBC AUTO-ENTMCNC: 33.8 G/DL (ref 31.5–35.7)
MCV RBC AUTO: 88.2 FL (ref 79–97)
MONOCYTES # BLD AUTO: 0.39 10*3/MM3 (ref 0.1–0.9)
MONOCYTES NFR BLD AUTO: 9.9 % (ref 5–12)
NEUTROPHILS NFR BLD AUTO: 2.46 10*3/MM3 (ref 1.7–7)
NEUTROPHILS NFR BLD AUTO: 62.4 % (ref 42.7–76)
NRBC BLD AUTO-RTO: 0 /100 WBC (ref 0–0.2)
PLATELET # BLD AUTO: 189 10*3/MM3 (ref 140–450)
PMV BLD AUTO: 10.4 FL (ref 6–12)
POTASSIUM SERPL-SCNC: 3.7 MMOL/L (ref 3.5–5.2)
PROT SERPL-MCNC: 6 G/DL (ref 6–8.5)
RBC # BLD AUTO: 2.72 10*6/MM3 (ref 3.77–5.28)
SODIUM SERPL-SCNC: 138 MMOL/L (ref 136–145)
WBC NRBC COR # BLD: 3.94 10*3/MM3 (ref 3.4–10.8)

## 2022-06-01 PROCEDURE — 25010000002 DEXAMETHASONE SODIUM PHOSPHATE 100 MG/10ML SOLUTION: Performed by: INTERNAL MEDICINE

## 2022-06-01 PROCEDURE — 99214 OFFICE O/P EST MOD 30 MIN: CPT | Performed by: INTERNAL MEDICINE

## 2022-06-01 PROCEDURE — 25010000002 CARBOPLATIN PER 50 MG: Performed by: INTERNAL MEDICINE

## 2022-06-01 PROCEDURE — 96417 CHEMO IV INFUS EACH ADDL SEQ: CPT | Performed by: INTERNAL MEDICINE

## 2022-06-01 PROCEDURE — 96413 CHEMO IV INFUSION 1 HR: CPT | Performed by: INTERNAL MEDICINE

## 2022-06-01 PROCEDURE — 25010000002 HEPARIN LOCK FLUSH PER 10 UNITS: Performed by: INTERNAL MEDICINE

## 2022-06-01 PROCEDURE — 25010000002 PACLITAXEL PER 1 MG: Performed by: INTERNAL MEDICINE

## 2022-06-01 PROCEDURE — 96375 TX/PRO/DX INJ NEW DRUG ADDON: CPT | Performed by: INTERNAL MEDICINE

## 2022-06-01 PROCEDURE — 25010000002 PALONOSETRON PER 25 MCG: Performed by: INTERNAL MEDICINE

## 2022-06-01 PROCEDURE — 85025 COMPLETE CBC W/AUTO DIFF WBC: CPT

## 2022-06-01 PROCEDURE — 25010000002 DIPHENHYDRAMINE PER 50 MG: Performed by: INTERNAL MEDICINE

## 2022-06-01 PROCEDURE — 36415 COLL VENOUS BLD VENIPUNCTURE: CPT

## 2022-06-01 PROCEDURE — 80053 COMPREHEN METABOLIC PANEL: CPT

## 2022-06-01 RX ORDER — PALONOSETRON 0.05 MG/ML
0.25 INJECTION, SOLUTION INTRAVENOUS ONCE
Status: COMPLETED | OUTPATIENT
Start: 2022-06-01 | End: 2022-06-01

## 2022-06-01 RX ORDER — FAMOTIDINE 10 MG/ML
20 INJECTION, SOLUTION INTRAVENOUS ONCE
Status: COMPLETED | OUTPATIENT
Start: 2022-06-01 | End: 2022-06-01

## 2022-06-01 RX ORDER — SODIUM CHLORIDE 0.9 % (FLUSH) 0.9 %
10 SYRINGE (ML) INJECTION AS NEEDED
Status: CANCELLED | OUTPATIENT
Start: 2022-06-07

## 2022-06-01 RX ORDER — HEPARIN SODIUM (PORCINE) LOCK FLUSH IV SOLN 100 UNIT/ML 100 UNIT/ML
500 SOLUTION INTRAVENOUS AS NEEDED
Status: DISCONTINUED | OUTPATIENT
Start: 2022-06-01 | End: 2022-06-01 | Stop reason: HOSPADM

## 2022-06-01 RX ORDER — SODIUM CHLORIDE 9 MG/ML
250 INJECTION, SOLUTION INTRAVENOUS ONCE
Status: COMPLETED | OUTPATIENT
Start: 2022-06-01 | End: 2022-06-01

## 2022-06-01 RX ORDER — DIPHENHYDRAMINE HYDROCHLORIDE 50 MG/ML
50 INJECTION INTRAMUSCULAR; INTRAVENOUS AS NEEDED
Status: DISCONTINUED | OUTPATIENT
Start: 2022-06-01 | End: 2022-06-01 | Stop reason: HOSPADM

## 2022-06-01 RX ORDER — SODIUM CHLORIDE 0.9 % (FLUSH) 0.9 %
10 SYRINGE (ML) INJECTION AS NEEDED
Status: DISCONTINUED | OUTPATIENT
Start: 2022-06-01 | End: 2022-06-01 | Stop reason: HOSPADM

## 2022-06-01 RX ORDER — MORPHINE SULFATE 30 MG/1
60 TABLET, FILM COATED, EXTENDED RELEASE ORAL 2 TIMES DAILY
Qty: 60 TABLET | Refills: 0 | Status: SHIPPED | OUTPATIENT
Start: 2022-06-01 | End: 2022-06-14 | Stop reason: SDUPTHER

## 2022-06-01 RX ORDER — FAMOTIDINE 10 MG/ML
20 INJECTION, SOLUTION INTRAVENOUS AS NEEDED
Status: DISCONTINUED | OUTPATIENT
Start: 2022-06-01 | End: 2022-06-01 | Stop reason: HOSPADM

## 2022-06-01 RX ORDER — HEPARIN SODIUM (PORCINE) LOCK FLUSH IV SOLN 100 UNIT/ML 100 UNIT/ML
500 SOLUTION INTRAVENOUS AS NEEDED
Status: CANCELLED | OUTPATIENT
Start: 2022-06-07

## 2022-06-01 RX ADMIN — SODIUM CHLORIDE 250 ML: 9 INJECTION, SOLUTION INTRAVENOUS at 11:11

## 2022-06-01 RX ADMIN — FAMOTIDINE 20 MG: 10 INJECTION INTRAVENOUS at 11:54

## 2022-06-01 RX ADMIN — Medication 10 ML: at 14:42

## 2022-06-01 RX ADMIN — DEXAMETHASONE SODIUM PHOSPHATE 12 MG: 10 INJECTION, SOLUTION INTRAMUSCULAR; INTRAVENOUS at 12:05

## 2022-06-01 RX ADMIN — DIPHENHYDRAMINE HYDROCHLORIDE 25 MG: 50 INJECTION, SOLUTION INTRAMUSCULAR; INTRAVENOUS at 11:26

## 2022-06-01 RX ADMIN — CARBOPLATIN 175 MG: 10 INJECTION, SOLUTION INTRAVENOUS at 13:57

## 2022-06-01 RX ADMIN — HEPARIN 500 UNITS: 100 SYRINGE at 14:43

## 2022-06-01 RX ADMIN — PACLITAXEL 125 MG: 6 INJECTION, SOLUTION INTRAVENOUS at 12:36

## 2022-06-01 RX ADMIN — PALONOSETRON 0.25 MG: 0.05 INJECTION, SOLUTION INTRAVENOUS at 11:11

## 2022-06-05 NOTE — PROGRESS NOTES
Subjective     Radha Duarte was seen in follow-up for breast cancer.  She remains on chemotherapy.  Burning sensation in hand and sloughing of skin in upper extremity has improved since last week.  No ulcers or open area.  Right upper extremity swelling has also improved significantly.    Nausea stable on antinausea medication.  Her overall pain is also been stable.  No numbness and tingling in extremities.  Mucositis has been stable on Magic mouthwash.  She is requesting new prescription which was sent to the pharmacy.    Past Medical History, Past Surgical History, Social History, Family History have been reviewed and are without significant changes except as mentioned.      Medications:  The current medication list was reviewed in the EMR    ALLERGIES:    Allergies   Allergen Reactions   • Percocet [Oxycodone-Acetaminophen] Nausea Only       Objective      Vitals:    06/01/22 1042   BP: 105/53   Pulse: 88   Resp: 18   Temp: 97.8 °F (36.6 °C)   SpO2: 97%   Weight: 56.2 kg (124 lb)   PainSc: 0-No pain     Current Status 6/1/2022   ECOG score 1       Physical Exam  Nursing note reviewed.   Constitutional:       Appearance: Normal appearance.   Cardiovascular:      Rate and Rhythm: Normal rate and regular rhythm.   Abdominal:      General: There is no distension.      Palpations: Abdomen is soft. There is no mass.      Tenderness: There is no abdominal tenderness.   Skin:     General: Skin is dry.      Comments: Erythema and sloughing of skin in palmar aspect of upper extremities improved compared to last visit.    Neurological:      General: No focal deficit present.      Mental Status: She is alert and oriented to person, place, and time. Mental status is at baseline.   Psychiatric:         Mood and Affect: Mood normal.         Behavior: Behavior normal.         Thought Content: Thought content normal.               RECENT LABS:Independently reviewed and summarized  Hematology WBC   Date Value Ref Range  Status   06/01/2022 3.94 3.40 - 10.80 10*3/mm3 Final     RBC   Date Value Ref Range Status   06/01/2022 2.72 (L) 3.77 - 5.28 10*6/mm3 Final     Hemoglobin   Date Value Ref Range Status   06/01/2022 8.1 (L) 12.0 - 15.9 g/dL Final     Hematocrit   Date Value Ref Range Status   06/01/2022 24.0 (L) 34.0 - 46.6 % Final     Platelets   Date Value Ref Range Status   06/01/2022 189 140 - 450 10*3/mm3 Final     Lab Results   Component Value Date    GLUCOSE 118 (H) 06/01/2022    BUN 12 06/01/2022    CREATININE 0.55 (L) 06/01/2022    BCR 21.8 06/01/2022    K 3.7 06/01/2022    CO2 22.0 06/01/2022    CALCIUM 8.8 06/01/2022    ALBUMIN 3.40 (L) 06/01/2022    AST 16 06/01/2022    ALT 10 06/01/2022          Radha Duarte reports a pain score of 0.  Given her pain assessment as noted, treatment options were discussed and the following options were decided upon as a follow-up plan to address the patient's pain: continuation of current treatment plan for pain.    Patient screened negative for depression based on a PHQ-9 score of 0 on 6/1/2022.     Diagnosis:   (1) Metastatic breast cancer with distant LN (mediastinal) metastases   Triple negative   Unable to do perform foundation one due to limited tissue      Current therapy:   Weekly carboplatin/paclitaxel     (2) Cancer associated pain   (3) Right upper extremity DVT   (4) Mucositis   (5) Unintentional weight loss   (6) HFSR     Assessment & Plan          (1) Metastatic breast cancer with distant LN (mediastinal) metastases     Chronic, stable.  Patient has received 3 doses of weekly carboplatin paclitaxel at [facility].  She us receiving weekly carboplatin and paclitaxel with me locally.   Tissue not sufficiency to foundation one testing.     She is tolerating chemotherapy well without any major side effects.  Grade 2 chemotherapy-induced nausea which is well controlled on antinausea medication.  She is also developed grade 2 chemotherapy-induced hand-foot skin reaction from  chemotherapy.  She was prescribed urea ointment last week with improvement.  No numbness or tingling in extremities.  Mucositis from chemotherapy has been stable on Magic mouthwash.  Her labs look stable.  Weekly carboplatin and paclitaxel was signed on today's visit.  Clinically she seems to have response to treatment.    She will come back in 1 week with CBC, CMP prior to next chemotherapy cycle.  I will plan to see her back in 2 weeks with CBC, CMP.        (2) Cancer associated pain     Chronic, stable  Patient is currently taking MS Contin 30 mg every 12 hour along with hydrocodone 5 mg intermittently for breakthrough pain.  New prescription sent.    (3) Right upper extremity DVT     Chronic, stable  Suspect right upper extremity DVT secondary to hypercoagulable state from malignancy.  She remains on Eliquis.  No new concern for bleeding or thrombosis.  Continue Eliquis with close monitoring.    (4) Mucositis     Chronic, stable.  Mucositis secondary to chemotherapy.  Continue Magic mouthwash as needed.  New prescription sent to the pharmacy.    (5) Unintentional weight loss     Likely secondary to cancer as well as treatment related.  Continue high-calorie high-protein diet.  Recommend small frequent meals.    (6) HFSR     Chronic, stable.  Grade 2 hand-foot skin reaction from chemotherapy.  She has been using urea ointment which is helping.  Continue chemotherapy with close monitoring.  Patient was instructed to call us if any worsening.    6/5/2022      CC:

## 2022-06-07 ENCOUNTER — INFUSION (OUTPATIENT)
Dept: ONCOLOGY | Facility: HOSPITAL | Age: 62
End: 2022-06-07

## 2022-06-07 VITALS
RESPIRATION RATE: 18 BRPM | TEMPERATURE: 97.7 F | SYSTOLIC BLOOD PRESSURE: 98 MMHG | DIASTOLIC BLOOD PRESSURE: 54 MMHG | HEART RATE: 90 BPM

## 2022-06-07 DIAGNOSIS — C50.919 MALIGNANT NEOPLASM OF FEMALE BREAST, UNSPECIFIED ESTROGEN RECEPTOR STATUS, UNSPECIFIED LATERALITY, UNSPECIFIED SITE OF BREAST: Primary | ICD-10-CM

## 2022-06-07 LAB
ALBUMIN SERPL-MCNC: 3.7 G/DL (ref 3.5–5.2)
ALBUMIN/GLOB SERPL: 1.5 G/DL
ALP SERPL-CCNC: 60 U/L (ref 39–117)
ALT SERPL W P-5'-P-CCNC: 8 U/L (ref 1–33)
ANION GAP SERPL CALCULATED.3IONS-SCNC: 11 MMOL/L (ref 5–15)
AST SERPL-CCNC: 18 U/L (ref 1–32)
BASOPHILS # BLD AUTO: 0.02 10*3/MM3 (ref 0–0.2)
BASOPHILS NFR BLD AUTO: 0.5 % (ref 0–1.5)
BILIRUB SERPL-MCNC: 0.5 MG/DL (ref 0–1.2)
BUN SERPL-MCNC: 10 MG/DL (ref 8–23)
BUN/CREAT SERPL: 19.6 (ref 7–25)
CALCIUM SPEC-SCNC: 9.5 MG/DL (ref 8.6–10.5)
CHLORIDE SERPL-SCNC: 107 MMOL/L (ref 98–107)
CO2 SERPL-SCNC: 22 MMOL/L (ref 22–29)
CREAT SERPL-MCNC: 0.51 MG/DL (ref 0.57–1)
DEPRECATED RDW RBC AUTO: 54.8 FL (ref 37–54)
EGFRCR SERPLBLD CKD-EPI 2021: 104.4 ML/MIN/1.73
EOSINOPHIL # BLD AUTO: 0.02 10*3/MM3 (ref 0–0.4)
EOSINOPHIL NFR BLD AUTO: 0.5 % (ref 0.3–6.2)
ERYTHROCYTE [DISTWIDTH] IN BLOOD BY AUTOMATED COUNT: 17.3 % (ref 12.3–15.4)
GLOBULIN UR ELPH-MCNC: 2.4 GM/DL
GLUCOSE SERPL-MCNC: 118 MG/DL (ref 65–99)
HCT VFR BLD AUTO: 24.9 % (ref 34–46.6)
HGB BLD-MCNC: 8.2 G/DL (ref 12–15.9)
HOLD SPECIMEN: NORMAL
IMM GRANULOCYTES # BLD AUTO: 0.02 10*3/MM3 (ref 0–0.05)
IMM GRANULOCYTES NFR BLD AUTO: 0.5 % (ref 0–0.5)
LYMPHOCYTES # BLD AUTO: 0.88 10*3/MM3 (ref 0.7–3.1)
LYMPHOCYTES NFR BLD AUTO: 22.6 % (ref 19.6–45.3)
MCH RBC QN AUTO: 29.9 PG (ref 26.6–33)
MCHC RBC AUTO-ENTMCNC: 32.9 G/DL (ref 31.5–35.7)
MCV RBC AUTO: 90.9 FL (ref 79–97)
MONOCYTES # BLD AUTO: 0.18 10*3/MM3 (ref 0.1–0.9)
MONOCYTES NFR BLD AUTO: 4.6 % (ref 5–12)
NEUTROPHILS NFR BLD AUTO: 2.78 10*3/MM3 (ref 1.7–7)
NEUTROPHILS NFR BLD AUTO: 71.3 % (ref 42.7–76)
NRBC BLD AUTO-RTO: 0 /100 WBC (ref 0–0.2)
PLATELET # BLD AUTO: 146 10*3/MM3 (ref 140–450)
PMV BLD AUTO: 10.7 FL (ref 6–12)
POTASSIUM SERPL-SCNC: 4.2 MMOL/L (ref 3.5–5.2)
PROT SERPL-MCNC: 6.1 G/DL (ref 6–8.5)
RBC # BLD AUTO: 2.74 10*6/MM3 (ref 3.77–5.28)
SODIUM SERPL-SCNC: 140 MMOL/L (ref 136–145)
WBC NRBC COR # BLD: 3.9 10*3/MM3 (ref 3.4–10.8)

## 2022-06-07 PROCEDURE — 36415 COLL VENOUS BLD VENIPUNCTURE: CPT

## 2022-06-07 PROCEDURE — 80053 COMPREHEN METABOLIC PANEL: CPT

## 2022-06-07 PROCEDURE — 25010000002 PALONOSETRON PER 25 MCG: Performed by: INTERNAL MEDICINE

## 2022-06-07 PROCEDURE — 25010000002 DEXAMETHASONE SODIUM PHOSPHATE 100 MG/10ML SOLUTION: Performed by: INTERNAL MEDICINE

## 2022-06-07 PROCEDURE — 25010000002 CARBOPLATIN PER 50 MG: Performed by: INTERNAL MEDICINE

## 2022-06-07 PROCEDURE — 96375 TX/PRO/DX INJ NEW DRUG ADDON: CPT | Performed by: INTERNAL MEDICINE

## 2022-06-07 PROCEDURE — 85025 COMPLETE CBC W/AUTO DIFF WBC: CPT

## 2022-06-07 PROCEDURE — 96413 CHEMO IV INFUSION 1 HR: CPT | Performed by: INTERNAL MEDICINE

## 2022-06-07 PROCEDURE — 25010000002 HEPARIN LOCK FLUSH PER 10 UNITS: Performed by: INTERNAL MEDICINE

## 2022-06-07 PROCEDURE — 25010000002 DIPHENHYDRAMINE PER 50 MG: Performed by: INTERNAL MEDICINE

## 2022-06-07 PROCEDURE — 25010000002 PACLITAXEL PER 1 MG: Performed by: INTERNAL MEDICINE

## 2022-06-07 PROCEDURE — 96417 CHEMO IV INFUS EACH ADDL SEQ: CPT | Performed by: INTERNAL MEDICINE

## 2022-06-07 RX ORDER — FAMOTIDINE 10 MG/ML
20 INJECTION, SOLUTION INTRAVENOUS AS NEEDED
Status: DISCONTINUED | OUTPATIENT
Start: 2022-06-07 | End: 2022-06-07 | Stop reason: HOSPADM

## 2022-06-07 RX ORDER — SODIUM CHLORIDE 0.9 % (FLUSH) 0.9 %
10 SYRINGE (ML) INJECTION AS NEEDED
Status: DISCONTINUED | OUTPATIENT
Start: 2022-06-07 | End: 2022-06-07 | Stop reason: HOSPADM

## 2022-06-07 RX ORDER — FAMOTIDINE 10 MG/ML
20 INJECTION, SOLUTION INTRAVENOUS ONCE
Status: COMPLETED | OUTPATIENT
Start: 2022-06-07 | End: 2022-06-07

## 2022-06-07 RX ORDER — DIPHENHYDRAMINE HYDROCHLORIDE 50 MG/ML
50 INJECTION INTRAMUSCULAR; INTRAVENOUS AS NEEDED
Status: DISCONTINUED | OUTPATIENT
Start: 2022-06-07 | End: 2022-06-07 | Stop reason: HOSPADM

## 2022-06-07 RX ORDER — SODIUM CHLORIDE 0.9 % (FLUSH) 0.9 %
10 SYRINGE (ML) INJECTION AS NEEDED
Status: CANCELLED | OUTPATIENT
Start: 2022-06-07

## 2022-06-07 RX ORDER — SODIUM CHLORIDE 9 MG/ML
250 INJECTION, SOLUTION INTRAVENOUS ONCE
Status: CANCELLED | OUTPATIENT
Start: 2022-06-07

## 2022-06-07 RX ORDER — HEPARIN SODIUM (PORCINE) LOCK FLUSH IV SOLN 100 UNIT/ML 100 UNIT/ML
500 SOLUTION INTRAVENOUS AS NEEDED
Status: DISCONTINUED | OUTPATIENT
Start: 2022-06-07 | End: 2022-06-07 | Stop reason: HOSPADM

## 2022-06-07 RX ORDER — SODIUM CHLORIDE 9 MG/ML
250 INJECTION, SOLUTION INTRAVENOUS ONCE
Status: COMPLETED | OUTPATIENT
Start: 2022-06-07 | End: 2022-06-07

## 2022-06-07 RX ORDER — DIPHENHYDRAMINE HYDROCHLORIDE 50 MG/ML
50 INJECTION INTRAMUSCULAR; INTRAVENOUS AS NEEDED
Status: CANCELLED | OUTPATIENT
Start: 2022-06-07

## 2022-06-07 RX ORDER — PALONOSETRON 0.05 MG/ML
0.25 INJECTION, SOLUTION INTRAVENOUS ONCE
Status: CANCELLED | OUTPATIENT
Start: 2022-06-07

## 2022-06-07 RX ORDER — FAMOTIDINE 10 MG/ML
20 INJECTION, SOLUTION INTRAVENOUS AS NEEDED
Status: CANCELLED | OUTPATIENT
Start: 2022-06-07

## 2022-06-07 RX ORDER — FAMOTIDINE 10 MG/ML
20 INJECTION, SOLUTION INTRAVENOUS ONCE
Status: CANCELLED | OUTPATIENT
Start: 2022-06-07

## 2022-06-07 RX ORDER — PALONOSETRON 0.05 MG/ML
0.25 INJECTION, SOLUTION INTRAVENOUS ONCE
Status: COMPLETED | OUTPATIENT
Start: 2022-06-07 | End: 2022-06-07

## 2022-06-07 RX ORDER — HEPARIN SODIUM (PORCINE) LOCK FLUSH IV SOLN 100 UNIT/ML 100 UNIT/ML
500 SOLUTION INTRAVENOUS AS NEEDED
Status: CANCELLED | OUTPATIENT
Start: 2022-06-14

## 2022-06-07 RX ADMIN — DEXAMETHASONE SODIUM PHOSPHATE 12 MG: 10 INJECTION, SOLUTION INTRAMUSCULAR; INTRAVENOUS at 09:35

## 2022-06-07 RX ADMIN — SODIUM CHLORIDE 250 ML: 9 INJECTION, SOLUTION INTRAVENOUS at 09:25

## 2022-06-07 RX ADMIN — DIPHENHYDRAMINE HYDROCHLORIDE 25 MG: 50 INJECTION, SOLUTION INTRAMUSCULAR; INTRAVENOUS at 10:12

## 2022-06-07 RX ADMIN — FAMOTIDINE 20 MG: 10 INJECTION INTRAVENOUS at 09:25

## 2022-06-07 RX ADMIN — PACLITAXEL 125 MG: 6 INJECTION, SOLUTION INTRAVENOUS at 11:38

## 2022-06-07 RX ADMIN — PALONOSETRON 0.25 MG: 0.05 INJECTION, SOLUTION INTRAVENOUS at 10:02

## 2022-06-07 RX ADMIN — CARBOPLATIN 110 MG: 10 INJECTION, SOLUTION INTRAVENOUS at 12:58

## 2022-06-07 RX ADMIN — HEPARIN 500 UNITS: 100 SYRINGE at 13:46

## 2022-06-14 ENCOUNTER — OFFICE VISIT (OUTPATIENT)
Dept: ONCOLOGY | Facility: CLINIC | Age: 62
End: 2022-06-14

## 2022-06-14 ENCOUNTER — INFUSION (OUTPATIENT)
Dept: ONCOLOGY | Facility: HOSPITAL | Age: 62
End: 2022-06-14

## 2022-06-14 VITALS
WEIGHT: 123.2 LBS | BODY MASS INDEX: 22.53 KG/M2 | TEMPERATURE: 97.8 F | OXYGEN SATURATION: 98 % | RESPIRATION RATE: 18 BRPM | DIASTOLIC BLOOD PRESSURE: 53 MMHG | SYSTOLIC BLOOD PRESSURE: 97 MMHG | HEART RATE: 76 BPM

## 2022-06-14 DIAGNOSIS — L27.1 HAND FOOT SYNDROME: ICD-10-CM

## 2022-06-14 DIAGNOSIS — G89.3 CANCER ASSOCIATED PAIN: ICD-10-CM

## 2022-06-14 DIAGNOSIS — I82.A12 ACUTE DEEP VEIN THROMBOSIS (DVT) OF AXILLARY VEIN OF LEFT UPPER EXTREMITY: ICD-10-CM

## 2022-06-14 DIAGNOSIS — C50.919 MALIGNANT NEOPLASM OF FEMALE BREAST, UNSPECIFIED ESTROGEN RECEPTOR STATUS, UNSPECIFIED LATERALITY, UNSPECIFIED SITE OF BREAST: Primary | ICD-10-CM

## 2022-06-14 DIAGNOSIS — K12.31 MUCOSITIS DUE TO ANTINEOPLASTIC THERAPY: ICD-10-CM

## 2022-06-14 LAB
ACANTHOCYTES BLD QL SMEAR: ABNORMAL
ALBUMIN SERPL-MCNC: 3.5 G/DL (ref 3.5–5.2)
ALBUMIN/GLOB SERPL: 1.4 G/DL
ALP SERPL-CCNC: 58 U/L (ref 39–117)
ALT SERPL W P-5'-P-CCNC: 9 U/L (ref 1–33)
ANION GAP SERPL CALCULATED.3IONS-SCNC: 7 MMOL/L (ref 5–15)
AST SERPL-CCNC: 17 U/L (ref 1–32)
BASOPHILS # BLD MANUAL: 0.04 10*3/MM3 (ref 0–0.2)
BASOPHILS NFR BLD MANUAL: 1 % (ref 0–1.5)
BILIRUB SERPL-MCNC: 0.5 MG/DL (ref 0–1.2)
BUN SERPL-MCNC: 10 MG/DL (ref 8–23)
BUN/CREAT SERPL: 17.5 (ref 7–25)
CALCIUM SPEC-SCNC: 8.7 MG/DL (ref 8.6–10.5)
CHLORIDE SERPL-SCNC: 106 MMOL/L (ref 98–107)
CO2 SERPL-SCNC: 24 MMOL/L (ref 22–29)
CREAT SERPL-MCNC: 0.57 MG/DL (ref 0.57–1)
DEPRECATED RDW RBC AUTO: 54.2 FL (ref 37–54)
EGFRCR SERPLBLD CKD-EPI 2021: 101.6 ML/MIN/1.73
ELLIPTOCYTES BLD QL SMEAR: ABNORMAL
ERYTHROCYTE [DISTWIDTH] IN BLOOD BY AUTOMATED COUNT: 17.4 % (ref 12.3–15.4)
GLOBULIN UR ELPH-MCNC: 2.5 GM/DL
GLUCOSE SERPL-MCNC: 90 MG/DL (ref 65–99)
HCT VFR BLD AUTO: 22.3 % (ref 34–46.6)
HGB BLD-MCNC: 7.4 G/DL (ref 12–15.9)
HOLD SPECIMEN: NORMAL
HYPOCHROMIA BLD QL: ABNORMAL
LARGE PLATELETS: ABNORMAL
LYMPHOCYTES # BLD MANUAL: 0.97 10*3/MM3 (ref 0.7–3.1)
LYMPHOCYTES NFR BLD MANUAL: 1 % (ref 5–12)
MCH RBC QN AUTO: 30.2 PG (ref 26.6–33)
MCHC RBC AUTO-ENTMCNC: 33.2 G/DL (ref 31.5–35.7)
MCV RBC AUTO: 91 FL (ref 79–97)
MONOCYTES # BLD: 0.04 10*3/MM3 (ref 0.1–0.9)
NEUTROPHILS # BLD AUTO: 2.65 10*3/MM3 (ref 1.7–7)
NEUTROPHILS NFR BLD MANUAL: 71 % (ref 42.7–76)
PLASMA CELL PREC NFR BLD MANUAL: 1 % (ref 0–0)
PLATELET # BLD AUTO: 83 10*3/MM3 (ref 140–450)
PMV BLD AUTO: 11.6 FL (ref 6–12)
POIKILOCYTOSIS BLD QL SMEAR: ABNORMAL
POLYCHROMASIA BLD QL SMEAR: ABNORMAL
POTASSIUM SERPL-SCNC: 4 MMOL/L (ref 3.5–5.2)
PROT SERPL-MCNC: 6 G/DL (ref 6–8.5)
RBC # BLD AUTO: 2.45 10*6/MM3 (ref 3.77–5.28)
SMALL PLATELETS BLD QL SMEAR: ADEQUATE
SODIUM SERPL-SCNC: 137 MMOL/L (ref 136–145)
VARIANT LYMPHS NFR BLD MANUAL: 26 % (ref 19.6–45.3)
WBC MORPH BLD: NORMAL
WBC NRBC COR # BLD: 3.73 10*3/MM3 (ref 3.4–10.8)

## 2022-06-14 PROCEDURE — 99214 OFFICE O/P EST MOD 30 MIN: CPT | Performed by: INTERNAL MEDICINE

## 2022-06-14 PROCEDURE — 85007 BL SMEAR W/DIFF WBC COUNT: CPT

## 2022-06-14 PROCEDURE — 25010000002 HEPARIN LOCK FLUSH PER 10 UNITS: Performed by: INTERNAL MEDICINE

## 2022-06-14 PROCEDURE — G0463 HOSPITAL OUTPT CLINIC VISIT: HCPCS | Performed by: INTERNAL MEDICINE

## 2022-06-14 PROCEDURE — 85025 COMPLETE CBC W/AUTO DIFF WBC: CPT

## 2022-06-14 PROCEDURE — 80053 COMPREHEN METABOLIC PANEL: CPT

## 2022-06-14 PROCEDURE — 36415 COLL VENOUS BLD VENIPUNCTURE: CPT

## 2022-06-14 RX ORDER — MORPHINE SULFATE 30 MG/1
60 TABLET, FILM COATED, EXTENDED RELEASE ORAL 2 TIMES DAILY
Qty: 60 TABLET | Refills: 0 | Status: SHIPPED | OUTPATIENT
Start: 2022-06-17 | End: 2022-06-23

## 2022-06-14 RX ORDER — HEPARIN SODIUM (PORCINE) LOCK FLUSH IV SOLN 100 UNIT/ML 100 UNIT/ML
500 SOLUTION INTRAVENOUS AS NEEDED
Status: CANCELLED | OUTPATIENT
Start: 2022-07-07

## 2022-06-14 RX ORDER — SODIUM CHLORIDE 0.9 % (FLUSH) 0.9 %
10 SYRINGE (ML) INJECTION AS NEEDED
Status: CANCELLED | OUTPATIENT
Start: 2022-06-14

## 2022-06-14 RX ORDER — PROMETHAZINE HYDROCHLORIDE 12.5 MG/1
12.5 TABLET ORAL EVERY 6 HOURS PRN
Qty: 90 TABLET | Refills: 0 | Status: SHIPPED | OUTPATIENT
Start: 2022-06-14 | End: 2022-10-21 | Stop reason: SDUPTHER

## 2022-06-14 RX ORDER — HEPARIN SODIUM (PORCINE) LOCK FLUSH IV SOLN 100 UNIT/ML 100 UNIT/ML
500 SOLUTION INTRAVENOUS AS NEEDED
Status: DISCONTINUED | OUTPATIENT
Start: 2022-06-14 | End: 2022-06-16 | Stop reason: HOSPADM

## 2022-06-14 RX ADMIN — HEPARIN 500 UNITS: 100 SYRINGE at 08:39

## 2022-06-14 NOTE — PROGRESS NOTES
Subjective     Radha Duarte was seen in follow-up for metastatic breast cancer.  She is struggling with nausea quite a bit last week.  She used Phenergan 25 mg to suppository which caused a lot of fatigue and sleepiness.  I will cut down the dose of Phenergan to 12.5 and change her to p.o.  Hand-foot skin reaction is stable.  No peripheral neuropathy.  Reports more fatigue and tiredness.  He has lost 2 more pounds since last visit.  Remains on Eliquis.      Past Medical History, Past Surgical History, Social History, Family History have been reviewed and are without significant changes except as mentioned.        Medications:  The current medication list was reviewed in the EMR    ALLERGIES:    Allergies   Allergen Reactions   • Percocet [Oxycodone-Acetaminophen] Nausea Only       Objective      Vitals:    06/14/22 0804   BP: 97/53   Pulse: 76   Resp: 18   Temp: 97.8 °F (36.6 °C)   SpO2: 98%         Current Status 6/7/2022   ECOG score 0       Physical Exam  Vitals and nursing note reviewed.   Constitutional:       Appearance: Normal appearance.   Abdominal:      General: There is no distension.      Palpations: Abdomen is soft. There is no mass.      Tenderness: There is no abdominal tenderness.   Skin:     General: Skin is dry.      Coloration: Skin is pale.      Comments: Hand-foot skin reaction on the palms improved   Neurological:      General: No focal deficit present.      Mental Status: She is alert and oriented to person, place, and time. Mental status is at baseline.   Psychiatric:         Mood and Affect: Mood normal.         Behavior: Behavior normal.         Thought Content: Thought content normal.               RECENT LABS:Independently reviewed and summarized  Hematology WBC   Date Value Ref Range Status   06/07/2022 3.90 3.40 - 10.80 10*3/mm3 Final     RBC   Date Value Ref Range Status   06/07/2022 2.74 (L) 3.77 - 5.28 10*6/mm3 Final     Hemoglobin   Date Value Ref Range Status    06/07/2022 8.2 (L) 12.0 - 15.9 g/dL Final     Hematocrit   Date Value Ref Range Status   06/07/2022 24.9 (L) 34.0 - 46.6 % Final     Platelets   Date Value Ref Range Status   06/07/2022 146 140 - 450 10*3/mm3 Final     WBC   Date Value Ref Range Status   06/14/2022 3.73 3.40 - 10.80 10*3/mm3 Final     RBC   Date Value Ref Range Status   06/14/2022 2.45 (L) 3.77 - 5.28 10*6/mm3 Final     Hemoglobin   Date Value Ref Range Status   06/14/2022 7.4 (L) 12.0 - 15.9 g/dL Final     Hematocrit   Date Value Ref Range Status   06/14/2022 22.3 (L) 34.0 - 46.6 % Final     MCV   Date Value Ref Range Status   06/14/2022 91.0 79.0 - 97.0 fL Final     MCH   Date Value Ref Range Status   06/14/2022 30.2 26.6 - 33.0 pg Final     MCHC   Date Value Ref Range Status   06/14/2022 33.2 31.5 - 35.7 g/dL Final     RDW   Date Value Ref Range Status   06/14/2022 17.4 (H) 12.3 - 15.4 % Final     RDW-SD   Date Value Ref Range Status   06/14/2022 54.2 (H) 37.0 - 54.0 fl Final     MPV   Date Value Ref Range Status   06/14/2022 11.6 6.0 - 12.0 fL Final     Platelets   Date Value Ref Range Status   06/14/2022 83 (L) 140 - 450 10*3/mm3 Final     Neutrophil %   Date Value Ref Range Status   06/07/2022 71.3 42.7 - 76.0 % Final     Lymphocyte %   Date Value Ref Range Status   06/07/2022 22.6 19.6 - 45.3 % Final     Monocyte %   Date Value Ref Range Status   06/07/2022 4.6 (L) 5.0 - 12.0 % Final     Eosinophil %   Date Value Ref Range Status   06/07/2022 0.5 0.3 - 6.2 % Final     Basophil %   Date Value Ref Range Status   06/07/2022 0.5 0.0 - 1.5 % Final     Immature Grans %   Date Value Ref Range Status   06/07/2022 0.5 0.0 - 0.5 % Final     Neutrophils, Absolute   Date Value Ref Range Status   06/07/2022 2.78 1.70 - 7.00 10*3/mm3 Final     Lymphocytes, Absolute   Date Value Ref Range Status   06/07/2022 0.88 0.70 - 3.10 10*3/mm3 Final     Monocytes, Absolute   Date Value Ref Range Status   06/07/2022 0.18 0.10 - 0.90 10*3/mm3 Final     Eosinophils,  Absolute   Date Value Ref Range Status   06/07/2022 0.02 0.00 - 0.40 10*3/mm3 Final     Basophils, Absolute   Date Value Ref Range Status   06/07/2022 0.02 0.00 - 0.20 10*3/mm3 Final     Immature Grans, Absolute   Date Value Ref Range Status   06/07/2022 0.02 0.00 - 0.05 10*3/mm3 Final     nRBC   Date Value Ref Range Status   06/07/2022 0.0 0.0 - 0.2 /100 WBC Final         Lab Results   Component Value Date    GLUCOSE 90 06/14/2022    BUN 10 06/14/2022    CREATININE 0.57 06/14/2022    BCR 17.5 06/14/2022    K 4.0 06/14/2022    CO2 24.0 06/14/2022    CALCIUM 8.7 06/14/2022    ALBUMIN 3.50 06/14/2022    AST 17 06/14/2022    ALT 9 06/14/2022          Radha Duarte reports a pain score of 0.  Given her pain assessment as noted, treatment options were discussed and the following options were decided upon as a follow-up plan to address the patient's pain: continuation of current treatment plan for pain.    Patient screened negative for depression based on a PHQ-9 score of 0 on 6/14/2022.         Diagnosis:   (1) Metastatic breast cancer with distant LN (mediastinal) metastases   Triple negative   Unable to do perform foundation one due to limited tissue      Current therapy:   Weekly carboplatin/paclitaxel     (2) Cancer associated pain   (3) Right upper extremity DVT   (4) Mucositis   (5) Unintentional weight loss   (6) HFSR     Assessment & Plan        (1) Metastatic breast cancer with distant LN (mediastinal) metastases     Chronic, stable    She received cycle 1 (3 weekly doses of carboplatin paclitaxel) here with me.  She received additional cycle prior at [facility].    She is tolerated chemotherapy well except for fatigue, grade 2 chemotherapy-induced nausea (which is responded to antinausea medication) and grade 2 hand-foot skin reaction from chemotherapy (which is responded to urea ointment).  She denies any numbness or tingling in extremities.  Mucositis from chemotherapy has been stable on Magic  mouthwash.    She does have grade 3 chemotherapy-induced anemia today.  Due to this reason I will give her a break from chemotherapy.    I will plan to obtain PET scan next week to evaluate response to treatment.    I will plan to see her back in 1 week with CBC, CMP prior to next chemotherapy cycle.     (2) Cancer associated pain     Chronic, stable.  She is currently taking MS Contin 30 mg every 12 hours along with hydrocodone 5 mg intermittently for breakthrough pain.  New prescription of MS Contin sent to the pharmacy.    (3) Right upper extremity DVT     Chronic, stable  Suspect right upper extremity DVT secondary to hypercoagulable state from malignancy.  She remains on Eliquis.  No new concern for bleeding or thrombosis.  Continue Eliquis with close monitoring.    (4) Mucositis     Chronic, stable.  Mucositis secondary to chemotherapy.  Continue Magic mouthwash as needed.  New prescription sent to the pharmacy.    (5) Unintentional weight loss     Continues to lose weight.  Likely secondary to cancer as well as treatment related.  Continue high-calorie high-protein diet.  Recommend small frequent meals.    (6) HFSR     Chronic, stable.  Grade 2 hand-foot skin reaction from chemotherapy.  She has been using urea ointment which is helping.  Continue chemotherapy with close monitoring.  Patient was instructed to call us if any worsening.    6/14/2022      CC:

## 2022-06-20 ENCOUNTER — HOSPITAL ENCOUNTER (OUTPATIENT)
Dept: PET IMAGING | Facility: HOSPITAL | Age: 62
Discharge: HOME | End: 2022-06-20
Admitting: INTERNAL MEDICINE

## 2022-06-20 DIAGNOSIS — C50.919 MALIGNANT NEOPLASM OF FEMALE BREAST, UNSPECIFIED ESTROGEN RECEPTOR STATUS, UNSPECIFIED LATERALITY, UNSPECIFIED SITE OF BREAST: ICD-10-CM

## 2022-06-20 PROCEDURE — A9552 F18 FDG: HCPCS | Performed by: INTERNAL MEDICINE

## 2022-06-20 PROCEDURE — 0 FLUDEOXYGLUCOSE F18 SOLUTION: Performed by: INTERNAL MEDICINE

## 2022-06-20 PROCEDURE — 78815 PET IMAGE W/CT SKULL-THIGH: CPT

## 2022-06-20 RX ADMIN — FLUDEOXYGLUCOSE F18 1 DOSE: 300 INJECTION INTRAVENOUS at 13:30

## 2022-06-23 ENCOUNTER — OFFICE VISIT (OUTPATIENT)
Dept: ONCOLOGY | Facility: CLINIC | Age: 62
End: 2022-06-23

## 2022-06-23 ENCOUNTER — INFUSION (OUTPATIENT)
Dept: ONCOLOGY | Facility: HOSPITAL | Age: 62
End: 2022-06-23

## 2022-06-23 VITALS
HEIGHT: 62 IN | OXYGEN SATURATION: 95 % | WEIGHT: 126.2 LBS | BODY MASS INDEX: 23.22 KG/M2 | DIASTOLIC BLOOD PRESSURE: 61 MMHG | HEART RATE: 91 BPM | SYSTOLIC BLOOD PRESSURE: 98 MMHG | TEMPERATURE: 97.6 F

## 2022-06-23 DIAGNOSIS — I82.A12 ACUTE DEEP VEIN THROMBOSIS (DVT) OF AXILLARY VEIN OF LEFT UPPER EXTREMITY: ICD-10-CM

## 2022-06-23 DIAGNOSIS — C50.919 MALIGNANT NEOPLASM OF FEMALE BREAST, UNSPECIFIED ESTROGEN RECEPTOR STATUS, UNSPECIFIED LATERALITY, UNSPECIFIED SITE OF BREAST: Primary | ICD-10-CM

## 2022-06-23 DIAGNOSIS — C50.919 MALIGNANT NEOPLASM OF FEMALE BREAST, UNSPECIFIED ESTROGEN RECEPTOR STATUS, UNSPECIFIED LATERALITY, UNSPECIFIED SITE OF BREAST: ICD-10-CM

## 2022-06-23 DIAGNOSIS — G89.3 CANCER ASSOCIATED PAIN: ICD-10-CM

## 2022-06-23 LAB
ALBUMIN SERPL-MCNC: 3.6 G/DL (ref 3.5–5.2)
ALBUMIN/GLOB SERPL: 1.6 G/DL
ALP SERPL-CCNC: 64 U/L (ref 39–117)
ALT SERPL W P-5'-P-CCNC: 6 U/L (ref 1–33)
ANION GAP SERPL CALCULATED.3IONS-SCNC: 9 MMOL/L (ref 5–15)
AST SERPL-CCNC: 16 U/L (ref 1–32)
BASOPHILS # BLD AUTO: 0.02 10*3/MM3 (ref 0–0.2)
BASOPHILS NFR BLD AUTO: 0.5 % (ref 0–1.5)
BILIRUB SERPL-MCNC: 0.5 MG/DL (ref 0–1.2)
BUN SERPL-MCNC: 8 MG/DL (ref 8–23)
BUN/CREAT SERPL: 16.3 (ref 7–25)
CALCIUM SPEC-SCNC: 8.4 MG/DL (ref 8.6–10.5)
CHLORIDE SERPL-SCNC: 108 MMOL/L (ref 98–107)
CO2 SERPL-SCNC: 24 MMOL/L (ref 22–29)
CREAT SERPL-MCNC: 0.49 MG/DL (ref 0.57–1)
DEPRECATED RDW RBC AUTO: 70.7 FL (ref 37–54)
EGFRCR SERPLBLD CKD-EPI 2021: 105.4 ML/MIN/1.73
EOSINOPHIL # BLD AUTO: 0.02 10*3/MM3 (ref 0–0.4)
EOSINOPHIL NFR BLD AUTO: 0.5 % (ref 0.3–6.2)
ERYTHROCYTE [DISTWIDTH] IN BLOOD BY AUTOMATED COUNT: 21 % (ref 12.3–15.4)
GLOBULIN UR ELPH-MCNC: 2.3 GM/DL
GLUCOSE SERPL-MCNC: 118 MG/DL (ref 65–99)
HCT VFR BLD AUTO: 24.3 % (ref 34–46.6)
HGB BLD-MCNC: 7.9 G/DL (ref 12–15.9)
HOLD SPECIMEN: NORMAL
IMM GRANULOCYTES # BLD AUTO: 0.01 10*3/MM3 (ref 0–0.05)
IMM GRANULOCYTES NFR BLD AUTO: 0.3 % (ref 0–0.5)
LYMPHOCYTES # BLD AUTO: 0.81 10*3/MM3 (ref 0.7–3.1)
LYMPHOCYTES NFR BLD AUTO: 21.1 % (ref 19.6–45.3)
MCH RBC QN AUTO: 31.1 PG (ref 26.6–33)
MCHC RBC AUTO-ENTMCNC: 32.5 G/DL (ref 31.5–35.7)
MCV RBC AUTO: 95.7 FL (ref 79–97)
MONOCYTES # BLD AUTO: 0.4 10*3/MM3 (ref 0.1–0.9)
MONOCYTES NFR BLD AUTO: 10.4 % (ref 5–12)
NEUTROPHILS NFR BLD AUTO: 2.57 10*3/MM3 (ref 1.7–7)
NEUTROPHILS NFR BLD AUTO: 67.2 % (ref 42.7–76)
NRBC BLD AUTO-RTO: 0 /100 WBC (ref 0–0.2)
PLATELET # BLD AUTO: 66 10*3/MM3 (ref 140–450)
PMV BLD AUTO: 10.1 FL (ref 6–12)
POTASSIUM SERPL-SCNC: 3.8 MMOL/L (ref 3.5–5.2)
PROT SERPL-MCNC: 5.9 G/DL (ref 6–8.5)
RBC # BLD AUTO: 2.54 10*6/MM3 (ref 3.77–5.28)
SODIUM SERPL-SCNC: 141 MMOL/L (ref 136–145)
WBC NRBC COR # BLD: 3.83 10*3/MM3 (ref 3.4–10.8)

## 2022-06-23 PROCEDURE — 99214 OFFICE O/P EST MOD 30 MIN: CPT | Performed by: INTERNAL MEDICINE

## 2022-06-23 PROCEDURE — 85025 COMPLETE CBC W/AUTO DIFF WBC: CPT

## 2022-06-23 PROCEDURE — 80053 COMPREHEN METABOLIC PANEL: CPT

## 2022-06-23 PROCEDURE — G0463 HOSPITAL OUTPT CLINIC VISIT: HCPCS | Performed by: INTERNAL MEDICINE

## 2022-06-23 PROCEDURE — 36591 DRAW BLOOD OFF VENOUS DEVICE: CPT | Performed by: INTERNAL MEDICINE

## 2022-06-23 PROCEDURE — 36415 COLL VENOUS BLD VENIPUNCTURE: CPT

## 2022-06-23 RX ORDER — MORPHINE SULFATE 15 MG/1
15 TABLET, FILM COATED, EXTENDED RELEASE ORAL 2 TIMES DAILY
Qty: 60 TABLET | Refills: 0 | Status: SHIPPED | OUTPATIENT
Start: 2022-06-23 | End: 2022-09-13

## 2022-06-23 NOTE — PROGRESS NOTES
Subjective     Radha Duarte was seen in follow-up for metastatic breast cancer and upper extremity DVT.  She has been stable since last visit.  Denies any new aches or pains.  Denies any fever or chills.  Fatigue from chemotherapy has been stable.    We reviewed the results of PET scan.    Past Medical History, Past Surgical History, Social History, Family History have been reviewed and are without significant changes except as mentioned.      Medications:  The current medication list was reviewed in the EMR    ALLERGIES:    Allergies   Allergen Reactions   • Percocet [Oxycodone-Acetaminophen] Nausea Only       Objective      Vitals:    06/23/22 0859   BP: 98/61   Pulse: 91   Temp: 97.6 °F (36.4 °C)   SpO2: 95%         Current Status 6/7/2022   ECOG score 0       Physical Exam  Vitals and nursing note reviewed.   Constitutional:       General: She is not in acute distress.     Appearance: Normal appearance.   Musculoskeletal:      Comments: Right upper extremity swelling +      Skin:     General: Skin is dry.      Coloration: Skin is pale.      Findings: Bruising present.   Neurological:      General: No focal deficit present.      Mental Status: She is alert and oriented to person, place, and time. Mental status is at baseline.   Psychiatric:         Mood and Affect: Mood normal.         Behavior: Behavior normal.         Thought Content: Thought content normal.               RECENT LABS:Independently reviewed and summarized  Hematology WBC   Date Value Ref Range Status   06/14/2022 3.73 3.40 - 10.80 10*3/mm3 Final     RBC   Date Value Ref Range Status   06/14/2022 2.45 (L) 3.77 - 5.28 10*6/mm3 Final     Hemoglobin   Date Value Ref Range Status   06/14/2022 7.4 (L) 12.0 - 15.9 g/dL Final     Hematocrit   Date Value Ref Range Status   06/14/2022 22.3 (L) 34.0 - 46.6 % Final     Platelets   Date Value Ref Range Status   06/14/2022 83 (L) 140 - 450 10*3/mm3 Final     WBC   Date Value Ref Range Status    06/23/2022 3.83 3.40 - 10.80 10*3/mm3 Final     RBC   Date Value Ref Range Status   06/23/2022 2.54 (L) 3.77 - 5.28 10*6/mm3 Final     Hemoglobin   Date Value Ref Range Status   06/23/2022 7.9 (L) 12.0 - 15.9 g/dL Final     Hematocrit   Date Value Ref Range Status   06/23/2022 24.3 (L) 34.0 - 46.6 % Final     MCV   Date Value Ref Range Status   06/23/2022 95.7 79.0 - 97.0 fL Final     MCH   Date Value Ref Range Status   06/23/2022 31.1 26.6 - 33.0 pg Final     MCHC   Date Value Ref Range Status   06/23/2022 32.5 31.5 - 35.7 g/dL Final     RDW   Date Value Ref Range Status   06/23/2022 21.0 (H) 12.3 - 15.4 % Final     RDW-SD   Date Value Ref Range Status   06/23/2022 70.7 (H) 37.0 - 54.0 fl Final     MPV   Date Value Ref Range Status   06/23/2022 10.1 6.0 - 12.0 fL Final     Platelets   Date Value Ref Range Status   06/23/2022 66 (L) 140 - 450 10*3/mm3 Final     Neutrophil %   Date Value Ref Range Status   06/23/2022 67.2 42.7 - 76.0 % Final     Lymphocyte %   Date Value Ref Range Status   06/23/2022 21.1 19.6 - 45.3 % Final     Monocyte %   Date Value Ref Range Status   06/23/2022 10.4 5.0 - 12.0 % Final     Eosinophil %   Date Value Ref Range Status   06/23/2022 0.5 0.3 - 6.2 % Final     Basophil %   Date Value Ref Range Status   06/23/2022 0.5 0.0 - 1.5 % Final     Immature Grans %   Date Value Ref Range Status   06/23/2022 0.3 0.0 - 0.5 % Final     Neutrophils, Absolute   Date Value Ref Range Status   06/23/2022 2.57 1.70 - 7.00 10*3/mm3 Final     Lymphocytes, Absolute   Date Value Ref Range Status   06/23/2022 0.81 0.70 - 3.10 10*3/mm3 Final     Monocytes, Absolute   Date Value Ref Range Status   06/23/2022 0.40 0.10 - 0.90 10*3/mm3 Final     Eosinophils, Absolute   Date Value Ref Range Status   06/23/2022 0.02 0.00 - 0.40 10*3/mm3 Final     Basophils, Absolute   Date Value Ref Range Status   06/23/2022 0.02 0.00 - 0.20 10*3/mm3 Final     Immature Grans, Absolute   Date Value Ref Range Status   06/23/2022 0.01  0.00 - 0.05 10*3/mm3 Final     nRBC   Date Value Ref Range Status   06/23/2022 0.0 0.0 - 0.2 /100 WBC Final       Lab Results   Component Value Date    GLUCOSE 118 (H) 06/23/2022    BUN 8 06/23/2022    CREATININE 0.49 (L) 06/23/2022    BCR 16.3 06/23/2022    K 3.8 06/23/2022    CO2 24.0 06/23/2022    CALCIUM 8.4 (L) 06/23/2022    ALBUMIN 3.60 06/23/2022    AST 16 06/23/2022    ALT 6 06/23/2022              Imaging (independently reviewed and summarized):     NM PET/CT Skull Base to Mid Thigh    Result Date: 6/22/2022  1.  The right breast is larger than left and there is skin thickening probably sequela of recent therapy.  2. Nuclear medicine PET/CT examination is otherwise unremarkable. No findings diagnostic for metastatic disease. Electronically signed by:  Juanjo Lara MD  6/22/2022 1:13 PM CDT Workstation: [id number]      Radha Duarte reports a pain score of 0.  Given her pain assessment as noted, treatment options were discussed and the following options were decided upon as a follow-up plan to address the patient's pain: continuation of current treatment plan for pain.    Patient screened negative for depression based on a PHQ-9 score of 0 on 6/23/2022.     Diagnosis:   (1) Metastatic breast cancer with distant LN (mediastinal) metastases   Triple negative   Unable to do perform foundation one due to limited tissue      Current therapy:   Weekly carboplatin/paclitaxel    (4/22/22 )     (2) Cancer associated pain   (3) Right upper extremity DVT   (4) Mucositis   (5) Unintentional weight loss   (6) HFSR        Assessment & Plan     (1) Metastatic breast cancer with distant LN (mediastinal) metastases     Chronic, stable.    She is currently on weekly carboplatin and paclitaxel.    She received cycle 1 (3 weekly doses of carboplatin and paclitaxel) at [facility].    She received cycle 2 here with us.    We reviewed the result of PET scan which showed improvement in metastatic adenopathy as well as primary  "breast mass.    This indicates favorable response to treatment.    She has grade 2 chemotherapy-induced anemia and grade 2 chemotherapy-induced thrombocytopenia.  Given improvement in malignancy as well as ongoing side effects from chemotherapy I recommend \" 2 weeks chemotherapy holiday\".  I will plan to see her back in 2 weeks with CBC, CMP prior to cycle 3.      (2) Cancer associated pain     Improving.  Due to improvement in the pain I will cut down the dose of MS Contin to 15 mg twice a day.  New prescription sent to the pharmacy.    (3) Right upper extremity DVT      Chronic, stable  Suspect right upper extremity DVT secondary to hypercoagulable state from malignancy.  She remains on Eliquis.  No new concern for bleeding or thrombosis.  Continue Eliquis with close monitoring.    (4) Mucositis     Chronic, stable.  Mucositis secondary to chemotherapy.  Continue Magic mouthwash as needed.    (5) Unintentional weight loss   Chronic, stable.  Continue high-calorie high-protein diet.      (6) HFSR     Chronic, stable.   Grade 2 hand-foot skin reaction from chemotherapy.  She has been using urea ointment which is helping.  Closely monitor on chemotherapy.  Patient was instructed to call us if any worsening.      6/23/2022      CC:          "

## 2022-06-27 ENCOUNTER — TELEPHONE (OUTPATIENT)
Dept: ONCOLOGY | Facility: CLINIC | Age: 62
End: 2022-06-27

## 2022-06-27 DIAGNOSIS — G89.3 CANCER ASSOCIATED PAIN: Primary | ICD-10-CM

## 2022-06-27 RX ORDER — GABAPENTIN 100 MG/1
100 CAPSULE ORAL 3 TIMES DAILY
Qty: 30 CAPSULE | Refills: 0 | Status: SHIPPED | OUTPATIENT
Start: 2022-06-27 | End: 2022-07-01 | Stop reason: SDUPTHER

## 2022-06-27 NOTE — TELEPHONE ENCOUNTER
Contacted pt and advised of new prescription. PT instructed to call office if issues continue. PT verbalizes understanding.

## 2022-07-01 ENCOUNTER — DOCUMENTATION (OUTPATIENT)
Dept: ONCOLOGY | Facility: HOSPITAL | Age: 62
End: 2022-07-01

## 2022-07-01 DIAGNOSIS — G89.3 CANCER ASSOCIATED PAIN: ICD-10-CM

## 2022-07-01 RX ORDER — ONDANSETRON HYDROCHLORIDE 8 MG/1
8 TABLET, FILM COATED ORAL EVERY 8 HOURS PRN
Qty: 90 TABLET | Refills: 0 | Status: SHIPPED | OUTPATIENT
Start: 2022-07-01 | End: 2022-07-22 | Stop reason: SDUPTHER

## 2022-07-01 RX ORDER — GABAPENTIN 100 MG/1
100 CAPSULE ORAL 3 TIMES DAILY
Qty: 30 CAPSULE | Refills: 0 | Status: SHIPPED | OUTPATIENT
Start: 2022-07-01 | End: 2022-07-07

## 2022-07-01 NOTE — TELEPHONE ENCOUNTER
Incoming Refill Request      Medication requested (name and dose): Gabapentin and Zofran     Pharmacy where request should be sent: [facility]     Additional details provided by patient: daughter called     Best call back number: [phone]    Does the patient have less than a 3 day supply:  [] Yes  [x] No    Kimmy Bedoya  07/01/22, 11:43 CDT

## 2022-07-01 NOTE — TELEPHONE ENCOUNTER
Rx Refill Note  Requested Prescriptions     Pending Prescriptions Disp Refills   • gabapentin (NEURONTIN) 100 MG capsule 30 capsule 0     Sig: Take 1 capsule by mouth 3 (Three) Times a Day.   • ondansetron (ZOFRAN) 8 MG tablet       Sig: Take 1 tablet by mouth Every 8 (Eight) Hours As Needed.      Last office visit with prescribing clinician: 6/23/2022      Next office visit with prescribing clinician: 7/7/2022            Juliet Smith RN  07/01/22, 11:50 CDT

## 2022-07-07 ENCOUNTER — INFUSION (OUTPATIENT)
Dept: ONCOLOGY | Facility: HOSPITAL | Age: 62
End: 2022-07-07

## 2022-07-07 ENCOUNTER — OFFICE VISIT (OUTPATIENT)
Dept: ONCOLOGY | Facility: CLINIC | Age: 62
End: 2022-07-07

## 2022-07-07 VITALS
OXYGEN SATURATION: 97 % | DIASTOLIC BLOOD PRESSURE: 58 MMHG | WEIGHT: 119.2 LBS | BODY MASS INDEX: 21.8 KG/M2 | SYSTOLIC BLOOD PRESSURE: 123 MMHG | RESPIRATION RATE: 18 BRPM | TEMPERATURE: 96.1 F | HEART RATE: 83 BPM

## 2022-07-07 DIAGNOSIS — L27.1 HAND FOOT SYNDROME: ICD-10-CM

## 2022-07-07 DIAGNOSIS — C50.919 MALIGNANT NEOPLASM OF FEMALE BREAST, UNSPECIFIED ESTROGEN RECEPTOR STATUS, UNSPECIFIED LATERALITY, UNSPECIFIED SITE OF BREAST: Primary | ICD-10-CM

## 2022-07-07 DIAGNOSIS — G89.3 CANCER ASSOCIATED PAIN: ICD-10-CM

## 2022-07-07 DIAGNOSIS — T45.1X5A CHEMOTHERAPY-INDUCED PERIPHERAL NEUROPATHY: ICD-10-CM

## 2022-07-07 DIAGNOSIS — G62.0 CHEMOTHERAPY-INDUCED PERIPHERAL NEUROPATHY: ICD-10-CM

## 2022-07-07 DIAGNOSIS — I82.A12 ACUTE DEEP VEIN THROMBOSIS (DVT) OF AXILLARY VEIN OF LEFT UPPER EXTREMITY: ICD-10-CM

## 2022-07-07 LAB
ALBUMIN SERPL-MCNC: 3.9 G/DL (ref 3.5–5.2)
ALBUMIN/GLOB SERPL: 1.6 G/DL
ALP SERPL-CCNC: 56 U/L (ref 39–117)
ALT SERPL W P-5'-P-CCNC: 9 U/L (ref 1–33)
ANION GAP SERPL CALCULATED.3IONS-SCNC: 8 MMOL/L (ref 5–15)
AST SERPL-CCNC: 18 U/L (ref 1–32)
BASOPHILS # BLD AUTO: 0.01 10*3/MM3 (ref 0–0.2)
BASOPHILS NFR BLD AUTO: 0.2 % (ref 0–1.5)
BILIRUB SERPL-MCNC: 0.5 MG/DL (ref 0–1.2)
BUN SERPL-MCNC: 15 MG/DL (ref 8–23)
BUN/CREAT SERPL: 30.6 (ref 7–25)
CALCIUM SPEC-SCNC: 9.6 MG/DL (ref 8.6–10.5)
CHLORIDE SERPL-SCNC: 106 MMOL/L (ref 98–107)
CO2 SERPL-SCNC: 25 MMOL/L (ref 22–29)
CREAT SERPL-MCNC: 0.49 MG/DL (ref 0.57–1)
DEPRECATED RDW RBC AUTO: 71.3 FL (ref 37–54)
EGFRCR SERPLBLD CKD-EPI 2021: 105.4 ML/MIN/1.73
EOSINOPHIL # BLD AUTO: 0.08 10*3/MM3 (ref 0–0.4)
EOSINOPHIL NFR BLD AUTO: 1.7 % (ref 0.3–6.2)
ERYTHROCYTE [DISTWIDTH] IN BLOOD BY AUTOMATED COUNT: 20.1 % (ref 12.3–15.4)
GLOBULIN UR ELPH-MCNC: 2.5 GM/DL
GLUCOSE SERPL-MCNC: 97 MG/DL (ref 65–99)
HCT VFR BLD AUTO: 29.8 % (ref 34–46.6)
HGB BLD-MCNC: 9.7 G/DL (ref 12–15.9)
IMM GRANULOCYTES # BLD AUTO: 0.01 10*3/MM3 (ref 0–0.05)
IMM GRANULOCYTES NFR BLD AUTO: 0.2 % (ref 0–0.5)
LYMPHOCYTES # BLD AUTO: 1.14 10*3/MM3 (ref 0.7–3.1)
LYMPHOCYTES NFR BLD AUTO: 24.1 % (ref 19.6–45.3)
MCH RBC QN AUTO: 31.7 PG (ref 26.6–33)
MCHC RBC AUTO-ENTMCNC: 32.6 G/DL (ref 31.5–35.7)
MCV RBC AUTO: 97.4 FL (ref 79–97)
MONOCYTES # BLD AUTO: 0.38 10*3/MM3 (ref 0.1–0.9)
MONOCYTES NFR BLD AUTO: 8 % (ref 5–12)
NEUTROPHILS NFR BLD AUTO: 3.12 10*3/MM3 (ref 1.7–7)
NEUTROPHILS NFR BLD AUTO: 65.8 % (ref 42.7–76)
NRBC BLD AUTO-RTO: 0 /100 WBC (ref 0–0.2)
PLATELET # BLD AUTO: 177 10*3/MM3 (ref 140–450)
PMV BLD AUTO: 10 FL (ref 6–12)
POTASSIUM SERPL-SCNC: 4.1 MMOL/L (ref 3.5–5.2)
PROT SERPL-MCNC: 6.4 G/DL (ref 6–8.5)
RBC # BLD AUTO: 3.06 10*6/MM3 (ref 3.77–5.28)
SODIUM SERPL-SCNC: 139 MMOL/L (ref 136–145)
WBC NRBC COR # BLD: 4.74 10*3/MM3 (ref 3.4–10.8)

## 2022-07-07 PROCEDURE — 99214 OFFICE O/P EST MOD 30 MIN: CPT | Performed by: INTERNAL MEDICINE

## 2022-07-07 PROCEDURE — 25010000002 HEPARIN LOCK FLUSH PER 10 UNITS: Performed by: INTERNAL MEDICINE

## 2022-07-07 PROCEDURE — G0463 HOSPITAL OUTPT CLINIC VISIT: HCPCS | Performed by: INTERNAL MEDICINE

## 2022-07-07 PROCEDURE — 85025 COMPLETE CBC W/AUTO DIFF WBC: CPT

## 2022-07-07 PROCEDURE — 36591 DRAW BLOOD OFF VENOUS DEVICE: CPT | Performed by: INTERNAL MEDICINE

## 2022-07-07 PROCEDURE — 80053 COMPREHEN METABOLIC PANEL: CPT

## 2022-07-07 RX ORDER — HEPARIN SODIUM (PORCINE) LOCK FLUSH IV SOLN 100 UNIT/ML 100 UNIT/ML
500 SOLUTION INTRAVENOUS AS NEEDED
Status: DISCONTINUED | OUTPATIENT
Start: 2022-07-07 | End: 2022-07-07 | Stop reason: HOSPADM

## 2022-07-07 RX ORDER — CAPECITABINE 500 MG/1
1500 TABLET, FILM COATED ORAL 2 TIMES DAILY
Qty: 84 TABLET | Refills: 5 | Status: SHIPPED | OUTPATIENT
Start: 2022-07-07 | End: 2022-08-05 | Stop reason: SDUPTHER

## 2022-07-07 RX ORDER — GABAPENTIN 100 MG/1
200 CAPSULE ORAL 3 TIMES DAILY
Qty: 180 CAPSULE | Refills: 0 | Status: SHIPPED | OUTPATIENT
Start: 2022-07-07 | End: 2022-07-22

## 2022-07-07 RX ORDER — SODIUM CHLORIDE 0.9 % (FLUSH) 0.9 %
10 SYRINGE (ML) INJECTION AS NEEDED
Status: CANCELLED | OUTPATIENT
Start: 2022-07-07

## 2022-07-07 RX ORDER — ONDANSETRON HYDROCHLORIDE 8 MG/1
8 TABLET, FILM COATED ORAL 3 TIMES DAILY PRN
Qty: 30 TABLET | Refills: 5 | Status: SHIPPED | OUTPATIENT
Start: 2022-07-07 | End: 2022-07-22 | Stop reason: SDUPTHER

## 2022-07-07 RX ORDER — HEPARIN SODIUM (PORCINE) LOCK FLUSH IV SOLN 100 UNIT/ML 100 UNIT/ML
500 SOLUTION INTRAVENOUS AS NEEDED
Status: CANCELLED | OUTPATIENT
Start: 2022-07-22

## 2022-07-07 RX ADMIN — HEPARIN 500 UNITS: 100 SYRINGE at 08:30

## 2022-07-08 ENCOUNTER — TELEPHONE (OUTPATIENT)
Dept: ONCOLOGY | Facility: CLINIC | Age: 62
End: 2022-07-08

## 2022-07-08 PROCEDURE — 87635 SARS-COV-2 COVID-19 AMP PRB: CPT | Performed by: NURSE PRACTITIONER

## 2022-07-08 NOTE — TELEPHONE ENCOUNTER
Patient wanted to make Dr Troncoso aware that she was able to  her chemo on today (originally believed she would not have this soon). Her chemo education appt is 7/14, does Dr Troncoso want to schedule it sooner?

## 2022-07-10 NOTE — PROGRESS NOTES
Subjective     Radha Duarte was seen in follow up for metastatic breast cancer.   Patient is reporting worsening pain for numbness and tingling in lower extremity.  This started when the dose of morphine was decreased.  Discussed with patient that this is consistent with paclitaxel associated neuropathy.    Discussed with patient that this will likely get worse and need more pain medication to control her pain.  Alternatively, we discussed about switching her to alternative chemotherapy such as oral capecitabine.  Side effects discussed at length.  She understood and was agreeable.  New prescription sent to the pharmacy.    Past Medical History, Past Surgical History, Social History, Family History have been reviewed and are without significant changes except as mentioned.        Medications:  The current medication list was reviewed in the EMR    ALLERGIES:    Allergies   Allergen Reactions   • Percocet [Oxycodone-Acetaminophen] Nausea Only       Objective      Vitals:    07/07/22 0803   BP: 123/58   Pulse: 83   Resp: 18   Temp: 96.1 °F (35.6 °C)   SpO2: 97%   Weight: 54.1 kg (119 lb 3.2 oz)   PainSc: 0-No pain     Current Status 6/7/2022   ECOG score 0       Physical Exam  Vitals and nursing note reviewed.   Constitutional:       Appearance: Normal appearance.   Musculoskeletal:      Comments: RUE edema - chronic, stable.    Neurological:      General: No focal deficit present.      Mental Status: She is alert and oriented to person, place, and time. Mental status is at baseline.   Psychiatric:         Mood and Affect: Mood normal.         Behavior: Behavior normal.         Thought Content: Thought content normal.               RECENT LABS:Independently reviewed and summarized  Hematology WBC   Date Value Ref Range Status   07/07/2022 4.74 3.40 - 10.80 10*3/mm3 Final     RBC   Date Value Ref Range Status   07/07/2022 3.06 (L) 3.77 - 5.28 10*6/mm3 Final     Hemoglobin   Date Value Ref Range Status    07/07/2022 9.7 (L) 12.0 - 15.9 g/dL Final     Hematocrit   Date Value Ref Range Status   07/07/2022 29.8 (L) 34.0 - 46.6 % Final     Platelets   Date Value Ref Range Status   07/07/2022 177 140 - 450 10*3/mm3 Final       Lab Results   Component Value Date    GLUCOSE 97 07/07/2022    BUN 15 07/07/2022    CREATININE 0.49 (L) 07/07/2022    BCR 30.6 (H) 07/07/2022    K 4.1 07/07/2022    CO2 25.0 07/07/2022    CALCIUM 9.6 07/07/2022    ALBUMIN 3.90 07/07/2022    AST 18 07/07/2022    ALT 9 07/07/2022          Radha Duarte reports a pain score of 0.  Given her pain assessment as noted, treatment options were discussed and the following options were decided upon as a follow-up plan to address the patient's pain: continuation of current treatment plan for pain.    Patient screened negative for depression based on a PHQ-9 score of 0 on 7/7/2022.       Diagnosis:   (1) Metastatic breast cancer with distant LN (mediastinal) metastases   Triple negative   Unable to do perform foundation one due to limited tissue      Current therapy:   Weekly carboplatin/paclitaxel     (4/22/22- 7/7/22)     Discontinue to pain peripheral neuropathy in lower extremities.     Switch to XELODA (7/7/220      (2) Cancer associated pain   (3) Right upper extremity DVT   (4) Mucositis   (5) Unintentional weight loss   (6) Chemotherapy induce peripheral neuropathy     Assessment & Plan     (1) Metastatic breast cancer with distant LN (mediastinal) metastases      Chronic, stable.     She is currently on weekly carboplatin and paclitaxel.     She received cycle 1 (3 weekly doses of carboplatin and paclitaxel) at [facility].     She received cycle 2 here with us.    She had a PET scan performed on June 22, 2022 which showed improvement in metastatic adenopathy.    She was scheduled to start her cycle 3 carboplatin paclitaxel today however is reporting painful peripheral neuropathy involving bilateral lower extremity.  She was given Neurontin 100  mg 3 times daily however this has not helped.  I will increase the dose of Neurontin to 200 mg 3 times daily.    We discussed given worsening peripheral neuropathy which potentially could be permanent I recommend switching her to XELODA.     Side effects including fatigue, nausea, upset stomach, vomiting, constipation, diarrhea, mucositis, hand-foot skin syndrome, cytopenias, hair loss, nail changes, risk of infection, bleeding, small risk of cardiotoxicity/hepatotoxicity/nephrotoxicity discussed.  She understood and was agreeable.    Prescription of Xeloda was sent to the pharmacy.      (2) Cancer associated pain     Patient increase the dose of morphine due to painful neuropathy in lower extremity.  I will increase the dose of Neurontin which is a better option for her peripheral neuropathy.  If improved with Neurontin she will continue to taper her morphine.    (3) Right upper extremity DVT     Chronic, stable.  Secondary to hypercoagulable state from malignancy.  She remains on Eliquis.  No new concern for bleeding or thrombosis.  Closely monitor.    (4) Mucositis     Chronic, stable.  Mucositis secondary to chemotherapy.  Continue Magic mouthwash as needed.    (5) Unintentional weight loss     Chronic, stable.  Continue high-calorie high-protein diet.  Recommend small frequent meals.      (6) Chemotherapy induce peripheral neuropathy     New issue.  Patient is reporting painful peripheral neuropathy involving bilateral lower extremities.  She was prescribed Neurontin 100 mg 3 times a day however this has not eliminated painful neuropathy completely.  Patient did felt like it did help with neuropathy.  I will increase the dose of Neurontin to 200 mg TID.   New prescription sent to the pharmacy.  As mentioned earlier we will discontinue paclitaxel.    7/10/2022      CC:

## 2022-07-11 ENCOUNTER — TELEPHONE (OUTPATIENT)
Dept: ONCOLOGY | Facility: HOSPITAL | Age: 62
End: 2022-07-11

## 2022-07-11 NOTE — TELEPHONE ENCOUNTER
----- Message from Trisha Meade MD sent at 7/10/2022  9:11 AM CDT -----    Ms Duarte is covid positive.   Hold chemo pills - xeloda.   Cancel her appointment next week.   She needs to reschedule in 2 weeks.

## 2022-07-11 NOTE — TELEPHONE ENCOUNTER
Spoke with pt's daughter and advised per Dr Troncoso. New appts provided. Daughter verbalizes understanding and denies any further questions.

## 2022-07-14 ENCOUNTER — APPOINTMENT (OUTPATIENT)
Dept: ONCOLOGY | Facility: HOSPITAL | Age: 62
End: 2022-07-14

## 2022-07-15 ENCOUNTER — TELEPHONE (OUTPATIENT)
Dept: ONCOLOGY | Facility: HOSPITAL | Age: 62
End: 2022-07-15

## 2022-07-15 NOTE — TELEPHONE ENCOUNTER
Instructed daughter as directed by Dr Troncoso. Appt given for next week. Verbalizes understanding and denies any further questions.

## 2022-07-15 NOTE — TELEPHONE ENCOUNTER
----- Message from Trisha Meade MD sent at 7/15/2022  9:25 AM CDT -----  She can start xeloda twice a day. Add her to see me next week with labs prior,   ----- Message -----  From: Nasrin Zuniga RN  Sent: 7/15/2022   9:17 AM CDT  To: Trisha Meade MD    What is the plan with her?

## 2022-07-19 DIAGNOSIS — C50.919 MALIGNANT NEOPLASM OF FEMALE BREAST, UNSPECIFIED ESTROGEN RECEPTOR STATUS, UNSPECIFIED LATERALITY, UNSPECIFIED SITE OF BREAST: Primary | ICD-10-CM

## 2022-07-22 ENCOUNTER — OFFICE VISIT (OUTPATIENT)
Dept: ONCOLOGY | Facility: CLINIC | Age: 62
End: 2022-07-22

## 2022-07-22 ENCOUNTER — INFUSION (OUTPATIENT)
Dept: ONCOLOGY | Facility: HOSPITAL | Age: 62
End: 2022-07-22

## 2022-07-22 VITALS
TEMPERATURE: 97.2 F | DIASTOLIC BLOOD PRESSURE: 69 MMHG | WEIGHT: 119.5 LBS | SYSTOLIC BLOOD PRESSURE: 121 MMHG | BODY MASS INDEX: 21.17 KG/M2 | OXYGEN SATURATION: 99 % | RESPIRATION RATE: 18 BRPM | HEART RATE: 82 BPM

## 2022-07-22 DIAGNOSIS — G89.3 CANCER ASSOCIATED PAIN: ICD-10-CM

## 2022-07-22 DIAGNOSIS — G62.0 CHEMOTHERAPY-INDUCED PERIPHERAL NEUROPATHY: ICD-10-CM

## 2022-07-22 DIAGNOSIS — T45.1X5A CHEMOTHERAPY-INDUCED PERIPHERAL NEUROPATHY: ICD-10-CM

## 2022-07-22 DIAGNOSIS — K12.31 MUCOSITIS DUE TO ANTINEOPLASTIC THERAPY: ICD-10-CM

## 2022-07-22 DIAGNOSIS — I82.A12 ACUTE DEEP VEIN THROMBOSIS (DVT) OF AXILLARY VEIN OF LEFT UPPER EXTREMITY: ICD-10-CM

## 2022-07-22 DIAGNOSIS — C50.919 MALIGNANT NEOPLASM OF FEMALE BREAST, UNSPECIFIED ESTROGEN RECEPTOR STATUS, UNSPECIFIED LATERALITY, UNSPECIFIED SITE OF BREAST: Primary | ICD-10-CM

## 2022-07-22 LAB
ALBUMIN SERPL-MCNC: 3.7 G/DL (ref 3.5–5.2)
ALBUMIN/GLOB SERPL: 1.6 G/DL
ALP SERPL-CCNC: 54 U/L (ref 39–117)
ALT SERPL W P-5'-P-CCNC: 7 U/L (ref 1–33)
ANION GAP SERPL CALCULATED.3IONS-SCNC: 6 MMOL/L (ref 5–15)
AST SERPL-CCNC: 15 U/L (ref 1–32)
BASOPHILS # BLD AUTO: 0.03 10*3/MM3 (ref 0–0.2)
BASOPHILS NFR BLD AUTO: 0.4 % (ref 0–1.5)
BILIRUB SERPL-MCNC: 0.4 MG/DL (ref 0–1.2)
BUN SERPL-MCNC: 12 MG/DL (ref 8–23)
BUN/CREAT SERPL: 26.1 (ref 7–25)
CALCIUM SPEC-SCNC: 8.9 MG/DL (ref 8.6–10.5)
CHLORIDE SERPL-SCNC: 107 MMOL/L (ref 98–107)
CO2 SERPL-SCNC: 27 MMOL/L (ref 22–29)
CREAT SERPL-MCNC: 0.46 MG/DL (ref 0.57–1)
DEPRECATED RDW RBC AUTO: 60.7 FL (ref 37–54)
EGFRCR SERPLBLD CKD-EPI 2021: 107 ML/MIN/1.73
EOSINOPHIL # BLD AUTO: 0.03 10*3/MM3 (ref 0–0.4)
EOSINOPHIL NFR BLD AUTO: 0.4 % (ref 0.3–6.2)
ERYTHROCYTE [DISTWIDTH] IN BLOOD BY AUTOMATED COUNT: 17.2 % (ref 12.3–15.4)
GLOBULIN UR ELPH-MCNC: 2.3 GM/DL
GLUCOSE SERPL-MCNC: 85 MG/DL (ref 65–99)
HCT VFR BLD AUTO: 29.3 % (ref 34–46.6)
HGB BLD-MCNC: 9.6 G/DL (ref 12–15.9)
HOLD SPECIMEN: NORMAL
IMM GRANULOCYTES # BLD AUTO: 0.05 10*3/MM3 (ref 0–0.05)
IMM GRANULOCYTES NFR BLD AUTO: 0.7 % (ref 0–0.5)
LYMPHOCYTES # BLD AUTO: 1.28 10*3/MM3 (ref 0.7–3.1)
LYMPHOCYTES NFR BLD AUTO: 19 % (ref 19.6–45.3)
MCH RBC QN AUTO: 31.7 PG (ref 26.6–33)
MCHC RBC AUTO-ENTMCNC: 32.8 G/DL (ref 31.5–35.7)
MCV RBC AUTO: 96.7 FL (ref 79–97)
MONOCYTES # BLD AUTO: 0.49 10*3/MM3 (ref 0.1–0.9)
MONOCYTES NFR BLD AUTO: 7.3 % (ref 5–12)
NEUTROPHILS NFR BLD AUTO: 4.87 10*3/MM3 (ref 1.7–7)
NEUTROPHILS NFR BLD AUTO: 72.2 % (ref 42.7–76)
NRBC BLD AUTO-RTO: 0 /100 WBC (ref 0–0.2)
PLATELET # BLD AUTO: 184 10*3/MM3 (ref 140–450)
PMV BLD AUTO: 9.8 FL (ref 6–12)
POTASSIUM SERPL-SCNC: 4.3 MMOL/L (ref 3.5–5.2)
PROT SERPL-MCNC: 6 G/DL (ref 6–8.5)
RBC # BLD AUTO: 3.03 10*6/MM3 (ref 3.77–5.28)
SODIUM SERPL-SCNC: 140 MMOL/L (ref 136–145)
WBC NRBC COR # BLD: 6.75 10*3/MM3 (ref 3.4–10.8)

## 2022-07-22 PROCEDURE — 85025 COMPLETE CBC W/AUTO DIFF WBC: CPT

## 2022-07-22 PROCEDURE — 80053 COMPREHEN METABOLIC PANEL: CPT

## 2022-07-22 PROCEDURE — G0463 HOSPITAL OUTPT CLINIC VISIT: HCPCS | Performed by: INTERNAL MEDICINE

## 2022-07-22 PROCEDURE — 36591 DRAW BLOOD OFF VENOUS DEVICE: CPT | Performed by: INTERNAL MEDICINE

## 2022-07-22 PROCEDURE — 99214 OFFICE O/P EST MOD 30 MIN: CPT | Performed by: INTERNAL MEDICINE

## 2022-07-22 PROCEDURE — 36415 COLL VENOUS BLD VENIPUNCTURE: CPT

## 2022-07-22 RX ORDER — SODIUM CHLORIDE 0.9 % (FLUSH) 0.9 %
10 SYRINGE (ML) INJECTION AS NEEDED
Status: CANCELLED | OUTPATIENT
Start: 2022-07-22

## 2022-07-22 RX ORDER — HEPARIN SODIUM (PORCINE) LOCK FLUSH IV SOLN 100 UNIT/ML 100 UNIT/ML
500 SOLUTION INTRAVENOUS AS NEEDED
Status: DISCONTINUED | OUTPATIENT
Start: 2022-07-22 | End: 2022-07-22 | Stop reason: HOSPADM

## 2022-07-22 RX ORDER — HEPARIN SODIUM (PORCINE) LOCK FLUSH IV SOLN 100 UNIT/ML 100 UNIT/ML
500 SOLUTION INTRAVENOUS AS NEEDED
Status: CANCELLED | OUTPATIENT
Start: 2022-07-22

## 2022-07-22 RX ORDER — GABAPENTIN 300 MG/1
300 CAPSULE ORAL 3 TIMES DAILY
Qty: 90 CAPSULE | Refills: 0 | Status: SHIPPED | OUTPATIENT
Start: 2022-07-22 | End: 2022-07-27 | Stop reason: SDUPTHER

## 2022-07-22 NOTE — TELEPHONE ENCOUNTER
Rx Refill Note  Requested Prescriptions     Pending Prescriptions Disp Refills   • Eliquis 5 MG tablet tablet 60 tablet 3     Sig: Take 1 tablet by mouth Every 12 (Twelve) Hours.   • ondansetron (ZOFRAN) 8 MG tablet 90 tablet 0     Sig: Take 1 tablet by mouth Every 8 (Eight) Hours As Needed for Vomiting.   • potassium chloride (K-DUR,KLOR-CON) 20 MEQ CR tablet 90 tablet 0     Sig: Take 1 tablet by mouth Daily.   • omeprazole (priLOSEC) 20 MG capsule       Sig: Take 1 capsule by mouth 2 (Two) Times a Day.      Last office visit with prescribing clinician: 7/22/2022      Next office visit with prescribing clinician: 8/5/2022            Nasrin Zuniga RN  07/22/22, 13:44 CDT

## 2022-07-23 RX ORDER — POTASSIUM CHLORIDE 20 MEQ/1
20 TABLET, EXTENDED RELEASE ORAL DAILY
Qty: 90 TABLET | Refills: 0 | Status: SHIPPED | OUTPATIENT
Start: 2022-07-23 | End: 2022-07-27 | Stop reason: SDUPTHER

## 2022-07-23 RX ORDER — ONDANSETRON HYDROCHLORIDE 8 MG/1
8 TABLET, FILM COATED ORAL EVERY 8 HOURS PRN
Qty: 90 TABLET | Refills: 0 | Status: SHIPPED | OUTPATIENT
Start: 2022-07-23 | End: 2022-08-17

## 2022-07-23 RX ORDER — APIXABAN 5 MG/1
5 TABLET, FILM COATED ORAL EVERY 12 HOURS SCHEDULED
Qty: 60 TABLET | Refills: 3 | Status: SHIPPED | OUTPATIENT
Start: 2022-07-23 | End: 2022-08-08

## 2022-07-23 RX ORDER — OMEPRAZOLE 20 MG/1
20 CAPSULE, DELAYED RELEASE ORAL 2 TIMES DAILY
Qty: 90 CAPSULE | Refills: 0 | Status: SHIPPED | OUTPATIENT
Start: 2022-07-23 | End: 2022-08-19

## 2022-07-24 NOTE — PROGRESS NOTES
Subjective     Radha Duarte was seen in follow-up for metastatic breast cancer and DVT.  She was diagnosed with COVID infection however made full recovery.  Her home test was negative.  She was given a break from chemotherapy and developed worsening right breast swelling along with right upper extremity swelling.  At that point I recommend she starts her Xeloda 1500 mg BID.   Patient instead took 500 mg tablets twice a day.  Her swelling has gone down some however continues to struggle with swelling.  Recommend she increase the dose of Xeloda at 1500 mg twice daily 2 weeks on followed by 1 week off.  She will continue Eliquis for upper extremity DVT.  Continues to struggle with peripheral neuropathy mainly in lower extremity.  Will increase the dose of gabapentin.    Past Medical History, Past Surgical History, Social History, Family History have been reviewed and are without significant changes except as mentioned.        Medications:  The current medication list was reviewed in the EMR    ALLERGIES:    Allergies   Allergen Reactions   • Percocet [Oxycodone-Acetaminophen] Nausea Only       Objective      Vitals:    07/22/22 1025   BP: 121/69   Pulse: 82   Resp: 18   Temp: 97.2 °F (36.2 °C)   SpO2: 99%   Weight: 54.2 kg (119 lb 8 oz)   PainSc: 0-No pain     Current Status 6/7/2022   ECOG score 0       Physical Exam  Vitals and nursing note reviewed.   Constitutional:       Appearance: Normal appearance.   Abdominal:      General: There is no distension.      Palpations: Abdomen is soft. There is no mass.      Tenderness: There is no abdominal tenderness.   Musculoskeletal:      Comments: RUE swelling - stable.    Neurological:      General: No focal deficit present.      Mental Status: She is alert and oriented to person, place, and time. Mental status is at baseline.   Psychiatric:         Mood and Affect: Mood normal.         Behavior: Behavior normal.         Thought Content: Thought content normal.                RECENT LABS:Independently reviewed and summarized  Hematology WBC   Date Value Ref Range Status   07/22/2022 6.75 3.40 - 10.80 10*3/mm3 Final     RBC   Date Value Ref Range Status   07/22/2022 3.03 (L) 3.77 - 5.28 10*6/mm3 Final     Hemoglobin   Date Value Ref Range Status   07/22/2022 9.6 (L) 12.0 - 15.9 g/dL Final     Hematocrit   Date Value Ref Range Status   07/22/2022 29.3 (L) 34.0 - 46.6 % Final     Platelets   Date Value Ref Range Status   07/22/2022 184 140 - 450 10*3/mm3 Final            Lab Results   Component Value Date    GLUCOSE 85 07/22/2022    BUN 12 07/22/2022    CREATININE 0.46 (L) 07/22/2022    BCR 26.1 (H) 07/22/2022    K 4.3 07/22/2022    CO2 27.0 07/22/2022    CALCIUM 8.9 07/22/2022    ALBUMIN 3.70 07/22/2022    AST 15 07/22/2022    ALT 7 07/22/2022       Radha Duarte reports a pain score of 0.  Given her pain assessment as noted, treatment options were discussed and the following options were decided upon as a follow-up plan to address the patient's pain: continuation of current treatment plan for pain.    Patient screened negative for depression based on a PHQ-9 score of 0 on 7/22/2022.       Diagnosis:   (1) Metastatic breast cancer with distant LN (mediastinal) metastases   Triple negative   Unable to do perform foundation one due to limited tissue      Current therapy:   Weekly carboplatin/paclitaxel     (4/22/22- 7/7/22)      Discontinue to pain peripheral neuropathy in lower extremities.      Switch to XELODA (7//22/22)      (2) Cancer associated pain   (3) Right upper extremity DVT   (4) Mucositis   (5) Unintentional weight loss   (6) Chemotherapy induce peripheral neuropathy     Assessment & Plan     (1) Metastatic breast cancer with distant LN (mediastinal)     Chronic, stable.   She was scheduled to start Xeloda on July 7 however developed COVID infection.  Her chemotherapy was held however she developed worsening right breast and upper extremity swelling  concerning for cancer progression.  She was instructed to stop Xeloda and was taking 500 mg twice daily which was much lower than prescribed dose.  I instructed patient to go back to full dose Xeloda 1500 mg twice a day 2 weeks on followed by 1 week off.  She is recovered from COVID without any major side effects.  Her home COVID test has been negative.  At this point I believe it safe to restart full dose chemotherapy.  Will follow-up in 2 weeks with CBC, CMP.      (2) Cancer associated pain    Patient is taking extended release morphine and hydrocodone as needed for pain.  We will taper opiate pain medication as tolerated.  She will use gabapentin for peripheral neuropathy.       (3) Right upper extremity DVT     Chronic, stable.  Secondary to hypercoagulable state from malignancy.  She remains on Eliquis.  No new concern for bleeding or thrombosis.  Continue to monitor.    (4) Mucositis     Chronic, stable.  Mucositis secondary to chemotherapy.  Continue Magic mouthwash as needed.    (5) Unintentional weight loss    Chronic, stable.  Continue high-calorie high-protein diet.  Recommend small frequent meals.     (6) Chemotherapy induce peripheral neuropathy    Chronic, stable.  Neurontin is helping.  However neuropathy in lower extremities persistent.  I will increase the dose of Neurontin to 300 mg 3 times a day.  New prescription sent to the pharmacy.  Taper morphine as tolerated.    7/24/2022      CC:

## 2022-07-27 DIAGNOSIS — G62.0 CHEMOTHERAPY-INDUCED PERIPHERAL NEUROPATHY: ICD-10-CM

## 2022-07-27 DIAGNOSIS — T45.1X5A CHEMOTHERAPY-INDUCED PERIPHERAL NEUROPATHY: ICD-10-CM

## 2022-07-27 RX ORDER — GABAPENTIN 300 MG/1
300 CAPSULE ORAL 3 TIMES DAILY
Qty: 90 CAPSULE | Refills: 0 | Status: SHIPPED | OUTPATIENT
Start: 2022-07-27 | End: 2022-08-19 | Stop reason: SDUPTHER

## 2022-07-27 RX ORDER — POTASSIUM CHLORIDE 20 MEQ/1
20 TABLET, EXTENDED RELEASE ORAL DAILY
Qty: 90 TABLET | Refills: 0 | Status: SHIPPED | OUTPATIENT
Start: 2022-07-27 | End: 2022-08-02 | Stop reason: SDUPTHER

## 2022-07-28 ENCOUNTER — APPOINTMENT (OUTPATIENT)
Dept: ONCOLOGY | Facility: CLINIC | Age: 62
End: 2022-07-28

## 2022-07-28 ENCOUNTER — APPOINTMENT (OUTPATIENT)
Dept: ONCOLOGY | Facility: HOSPITAL | Age: 62
End: 2022-07-28

## 2022-08-02 RX ORDER — POTASSIUM CHLORIDE 20 MEQ/1
20 TABLET, EXTENDED RELEASE ORAL DAILY
Qty: 90 TABLET | Refills: 0 | Status: SHIPPED | OUTPATIENT
Start: 2022-08-02 | End: 2022-08-17 | Stop reason: SDUPTHER

## 2022-08-05 ENCOUNTER — LAB (OUTPATIENT)
Dept: ONCOLOGY | Facility: HOSPITAL | Age: 62
End: 2022-08-05

## 2022-08-05 ENCOUNTER — TELEPHONE (OUTPATIENT)
Dept: ONCOLOGY | Facility: HOSPITAL | Age: 62
End: 2022-08-05

## 2022-08-05 ENCOUNTER — OFFICE VISIT (OUTPATIENT)
Dept: ONCOLOGY | Facility: CLINIC | Age: 62
End: 2022-08-05

## 2022-08-05 VITALS
SYSTOLIC BLOOD PRESSURE: 123 MMHG | BODY MASS INDEX: 21.26 KG/M2 | DIASTOLIC BLOOD PRESSURE: 63 MMHG | WEIGHT: 120 LBS | RESPIRATION RATE: 18 BRPM | HEART RATE: 76 BPM | OXYGEN SATURATION: 100 %

## 2022-08-05 DIAGNOSIS — R10.2 SUPRAPUBIC PAIN: ICD-10-CM

## 2022-08-05 DIAGNOSIS — C50.919 MALIGNANT NEOPLASM OF FEMALE BREAST, UNSPECIFIED ESTROGEN RECEPTOR STATUS, UNSPECIFIED LATERALITY, UNSPECIFIED SITE OF BREAST: Primary | ICD-10-CM

## 2022-08-05 DIAGNOSIS — I82.A12 ACUTE DEEP VEIN THROMBOSIS (DVT) OF AXILLARY VEIN OF LEFT UPPER EXTREMITY: ICD-10-CM

## 2022-08-05 DIAGNOSIS — T45.1X5A CHEMOTHERAPY-INDUCED PERIPHERAL NEUROPATHY: ICD-10-CM

## 2022-08-05 DIAGNOSIS — G62.0 CHEMOTHERAPY-INDUCED PERIPHERAL NEUROPATHY: ICD-10-CM

## 2022-08-05 DIAGNOSIS — G89.3 CANCER ASSOCIATED PAIN: ICD-10-CM

## 2022-08-05 LAB
ALBUMIN SERPL-MCNC: 3.7 G/DL (ref 3.5–5.2)
ALBUMIN/GLOB SERPL: 1.6 G/DL
ALP SERPL-CCNC: 56 U/L (ref 39–117)
ALT SERPL W P-5'-P-CCNC: 8 U/L (ref 1–33)
ANION GAP SERPL CALCULATED.3IONS-SCNC: 9 MMOL/L (ref 5–15)
AST SERPL-CCNC: 17 U/L (ref 1–32)
BACTERIA UR QL AUTO: ABNORMAL /HPF
BASOPHILS # BLD AUTO: 0.03 10*3/MM3 (ref 0–0.2)
BASOPHILS NFR BLD AUTO: 0.6 % (ref 0–1.5)
BILIRUB SERPL-MCNC: 0.5 MG/DL (ref 0–1.2)
BILIRUB UR QL STRIP: NEGATIVE
BUN SERPL-MCNC: 12 MG/DL (ref 8–23)
BUN/CREAT SERPL: 21.1 (ref 7–25)
CALCIUM SPEC-SCNC: 9 MG/DL (ref 8.6–10.5)
CHLORIDE SERPL-SCNC: 109 MMOL/L (ref 98–107)
CLARITY UR: ABNORMAL
CO2 SERPL-SCNC: 26 MMOL/L (ref 22–29)
COLOR UR: YELLOW
CREAT SERPL-MCNC: 0.57 MG/DL (ref 0.57–1)
DEPRECATED RDW RBC AUTO: 62.2 FL (ref 37–54)
EGFRCR SERPLBLD CKD-EPI 2021: 101.6 ML/MIN/1.73
EOSINOPHIL # BLD AUTO: 0.08 10*3/MM3 (ref 0–0.4)
EOSINOPHIL NFR BLD AUTO: 1.7 % (ref 0.3–6.2)
ERYTHROCYTE [DISTWIDTH] IN BLOOD BY AUTOMATED COUNT: 17.2 % (ref 12.3–15.4)
GLOBULIN UR ELPH-MCNC: 2.3 GM/DL
GLUCOSE SERPL-MCNC: 119 MG/DL (ref 65–99)
GLUCOSE UR STRIP-MCNC: NEGATIVE MG/DL
HCT VFR BLD AUTO: 31 % (ref 34–46.6)
HGB BLD-MCNC: 10.4 G/DL (ref 12–15.9)
HGB UR QL STRIP.AUTO: ABNORMAL
HOLD SPECIMEN: NORMAL
HYALINE CASTS UR QL AUTO: ABNORMAL /LPF
IMM GRANULOCYTES # BLD AUTO: 0.03 10*3/MM3 (ref 0–0.05)
IMM GRANULOCYTES NFR BLD AUTO: 0.6 % (ref 0–0.5)
KETONES UR QL STRIP: NEGATIVE
LEUKOCYTE ESTERASE UR QL STRIP.AUTO: ABNORMAL
LYMPHOCYTES # BLD AUTO: 0.99 10*3/MM3 (ref 0.7–3.1)
LYMPHOCYTES NFR BLD AUTO: 21.4 % (ref 19.6–45.3)
MCH RBC QN AUTO: 33.7 PG (ref 26.6–33)
MCHC RBC AUTO-ENTMCNC: 33.5 G/DL (ref 31.5–35.7)
MCV RBC AUTO: 100.3 FL (ref 79–97)
MONOCYTES # BLD AUTO: 0.35 10*3/MM3 (ref 0.1–0.9)
MONOCYTES NFR BLD AUTO: 7.6 % (ref 5–12)
NEUTROPHILS NFR BLD AUTO: 3.15 10*3/MM3 (ref 1.7–7)
NEUTROPHILS NFR BLD AUTO: 68.1 % (ref 42.7–76)
NITRITE UR QL STRIP: NEGATIVE
NRBC BLD AUTO-RTO: 0 /100 WBC (ref 0–0.2)
PH UR STRIP.AUTO: 7.5 [PH] (ref 5–9)
PLATELET # BLD AUTO: 175 10*3/MM3 (ref 140–450)
PMV BLD AUTO: 9.6 FL (ref 6–12)
POTASSIUM SERPL-SCNC: 3.7 MMOL/L (ref 3.5–5.2)
PROT SERPL-MCNC: 6 G/DL (ref 6–8.5)
PROT UR QL STRIP: NEGATIVE
RBC # BLD AUTO: 3.09 10*6/MM3 (ref 3.77–5.28)
RBC # UR STRIP: ABNORMAL /HPF
REF LAB TEST METHOD: ABNORMAL
SODIUM SERPL-SCNC: 144 MMOL/L (ref 136–145)
SP GR UR STRIP: 1.01 (ref 1–1.03)
SQUAMOUS #/AREA URNS HPF: ABNORMAL /HPF
UROBILINOGEN UR QL STRIP: ABNORMAL
WBC # UR STRIP: ABNORMAL /HPF
WBC NRBC COR # BLD: 4.63 10*3/MM3 (ref 3.4–10.8)

## 2022-08-05 PROCEDURE — 36415 COLL VENOUS BLD VENIPUNCTURE: CPT

## 2022-08-05 PROCEDURE — 87086 URINE CULTURE/COLONY COUNT: CPT | Performed by: INTERNAL MEDICINE

## 2022-08-05 PROCEDURE — 85025 COMPLETE CBC W/AUTO DIFF WBC: CPT

## 2022-08-05 PROCEDURE — 81001 URINALYSIS AUTO W/SCOPE: CPT | Performed by: INTERNAL MEDICINE

## 2022-08-05 PROCEDURE — G0463 HOSPITAL OUTPT CLINIC VISIT: HCPCS | Performed by: INTERNAL MEDICINE

## 2022-08-05 PROCEDURE — 99214 OFFICE O/P EST MOD 30 MIN: CPT | Performed by: INTERNAL MEDICINE

## 2022-08-05 PROCEDURE — 25010000002 HEPARIN LOCK FLUSH PER 10 UNITS: Performed by: INTERNAL MEDICINE

## 2022-08-05 PROCEDURE — 36591 DRAW BLOOD OFF VENOUS DEVICE: CPT | Performed by: INTERNAL MEDICINE

## 2022-08-05 PROCEDURE — 80053 COMPREHEN METABOLIC PANEL: CPT

## 2022-08-05 RX ORDER — SULFAMETHOXAZOLE AND TRIMETHOPRIM 800; 160 MG/1; MG/1
1 TABLET ORAL 2 TIMES DAILY
Qty: 10 TABLET | Refills: 0 | Status: SHIPPED | OUTPATIENT
Start: 2022-08-05 | End: 2022-09-06

## 2022-08-05 RX ORDER — SODIUM CHLORIDE 0.9 % (FLUSH) 0.9 %
10 SYRINGE (ML) INJECTION AS NEEDED
Status: CANCELLED | OUTPATIENT
Start: 2022-08-17

## 2022-08-05 RX ORDER — HEPARIN SODIUM (PORCINE) LOCK FLUSH IV SOLN 100 UNIT/ML 100 UNIT/ML
500 SOLUTION INTRAVENOUS AS NEEDED
Status: DISCONTINUED | OUTPATIENT
Start: 2022-08-05 | End: 2022-08-05 | Stop reason: HOSPADM

## 2022-08-05 RX ORDER — HEPARIN SODIUM (PORCINE) LOCK FLUSH IV SOLN 100 UNIT/ML 100 UNIT/ML
500 SOLUTION INTRAVENOUS AS NEEDED
Status: CANCELLED | OUTPATIENT
Start: 2022-08-17

## 2022-08-05 RX ORDER — SODIUM CHLORIDE 0.9 % (FLUSH) 0.9 %
10 SYRINGE (ML) INJECTION AS NEEDED
Status: DISCONTINUED | OUTPATIENT
Start: 2022-08-05 | End: 2022-08-05 | Stop reason: HOSPADM

## 2022-08-05 RX ORDER — MORPHINE SULFATE 15 MG/1
30 TABLET, FILM COATED, EXTENDED RELEASE ORAL 2 TIMES DAILY
Qty: 90 TABLET | Refills: 0 | Status: SHIPPED | OUTPATIENT
Start: 2022-08-05 | End: 2022-09-13 | Stop reason: SDUPTHER

## 2022-08-05 RX ORDER — CAPECITABINE 500 MG/1
1500 TABLET, FILM COATED ORAL 2 TIMES DAILY
Qty: 84 TABLET | Refills: 5 | Status: SHIPPED | OUTPATIENT
Start: 2022-08-05 | End: 2022-08-19 | Stop reason: SDUPTHER

## 2022-08-05 RX ADMIN — HEPARIN 500 UNITS: 100 SYRINGE at 08:16

## 2022-08-05 NOTE — TELEPHONE ENCOUNTER
----- Message from Trisha Meade MD sent at 8/5/2022 10:00 AM CDT -----  Prescription of bactrim sent to [facility] for urine infection.

## 2022-08-05 NOTE — TELEPHONE ENCOUNTER
Contacted pt and informed prescription for AB sent. Verbalizes understanding and denies any further questions.

## 2022-08-06 LAB — BACTERIA SPEC AEROBE CULT: NO GROWTH

## 2022-08-08 RX ORDER — APIXABAN 5 MG/1
TABLET, FILM COATED ORAL
Qty: 60 TABLET | Refills: 2 | Status: SHIPPED | OUTPATIENT
Start: 2022-08-08 | End: 2022-09-28

## 2022-08-08 NOTE — PROGRESS NOTES
Subjective     Radha Duarte was seen in follow-up for metastatic breast cancer.  She is currently using capecitabine.  Tolerating chemotherapy well without any major side effects.  She is reporting somewhat worsening of right upper extremity swelling.  This is from metastatic breast cancer as well as upper extremity DVT.  She continued to take Eliquis without any bleeding concerns.    Her overall pain has been stable on MS Contin along with Neurontin.  Numbness and tingling in lower extremity has been stable as well.    She is reporting suprapubic pain with burning sensation on urination.  No hematuria.  No fever or chills.    Past Medical History, Past Surgical History, Social History, Family History have been reviewed and are without significant changes except as mentioned.        Medications:  The current medication list was reviewed in the EMR    ALLERGIES:    Allergies   Allergen Reactions   • Percocet [Oxycodone-Acetaminophen] Nausea Only       Objective      Vitals:    08/05/22 0857   BP: 123/63   Pulse: 76   Resp: 18   SpO2: 100%   Weight: 54.4 kg (120 lb)   PainSc: 0-No pain     Current Status 6/7/2022   ECOG score 0       Physical Exam  Vitals and nursing note reviewed.   Constitutional:       Appearance: Normal appearance.   Abdominal:      Palpations: Abdomen is soft. There is no mass.      Tenderness: There is abdominal tenderness.      Hernia: No hernia is present.      Comments: Tenderness in the suprapubic region   Musculoskeletal:      Right lower leg: No edema.      Left lower leg: No edema.      Comments: Right upper extremity edema +    Skin:     Coloration: Skin is pale.      Findings: Bruising present.   Neurological:      General: No focal deficit present.      Mental Status: She is alert and oriented to person, place, and time. Mental status is at baseline.   Psychiatric:         Mood and Affect: Mood normal.         Behavior: Behavior normal.         Thought Content: Thought  content normal.               RECENT LABS:Independently reviewed and summarized  Hematology WBC   Date Value Ref Range Status   08/05/2022 4.63 3.40 - 10.80 10*3/mm3 Final     RBC   Date Value Ref Range Status   08/05/2022 3.09 (L) 3.77 - 5.28 10*6/mm3 Final     Hemoglobin   Date Value Ref Range Status   08/05/2022 10.4 (L) 12.0 - 15.9 g/dL Final     Hematocrit   Date Value Ref Range Status   08/05/2022 31.0 (L) 34.0 - 46.6 % Final     Platelets   Date Value Ref Range Status   08/05/2022 175 140 - 450 10*3/mm3 Final       Lab Results   Component Value Date    GLUCOSE 119 (H) 08/05/2022    BUN 12 08/05/2022    CREATININE 0.57 08/05/2022    BCR 21.1 08/05/2022    K 3.7 08/05/2022    CO2 26.0 08/05/2022    CALCIUM 9.0 08/05/2022    ALBUMIN 3.70 08/05/2022    AST 17 08/05/2022    ALT 8 08/05/2022            Urine dipstick shows positive for WBC's and moderate leukocyte esterase.      Radha Duarte reports a pain score of 0.  Given her pain assessment as noted, treatment options were discussed and the following options were decided upon as a follow-up plan to address the patient's pain: continuation of current treatment plan for pain.    Patient screened positive for depression based on a PHQ-9 score of 0 on 8/5/2022.          Diagnosis:   (1) Metastatic breast cancer with distant LN (mediastinal) metastases   Triple negative   Unable to do perform foundation one due to limited tissue      Current therapy:   Weekly carboplatin/paclitaxel     (4/22/22- 7/7/22)      Discontinue to pain peripheral neuropathy in lower extremities.      Switch to XELODA (7//22/22)      (2) Cancer associated pain   (3) Right upper extremity DVT   (4) Mucositis   (5) Unintentional weight loss   (6) Chemotherapy induce peripheral neuropathy   (7) Suprapubic pain       Assessment & Plan     (1) Metastatic breast cancer with distant LN (mediastinal)     Chronic, stable.  Patient has completed cycle 1 Xeloda.  Tolerated well without any  major side effect.  Grade 2 nausea which responded to antinausea medication.  No emesis.  No diarrhea.  No hand-foot skin reaction.  She did have COVID infection however recovered completely.    She does have slightly worsening of right upper extremity edema.  Discussed with patient that this is still too early to tell whether cancer is progressing.  Recommend continue Xeloda 1500 mg twice a day - 2 weeks on, 1 week off.   New prescription sent to the pharmacy.  I will plan to see her back in 2 weeks with CBC, CMP.      (2) Cancer associated pain     Patient is taking extended release morphine and hydrocodone as needed for pain.  Continue MS Contin 30 mg in the morning and 15 mg in the evening.  Titrate pain medication down as tolerated.    (3) Right upper extremity DVT     Chronic, stable.  Secondary to hypercoagulable state from malignancy.  She remains on Eliquis.  No new concern for bleeding or thrombosis.  Continue to monitor.    (4) Mucositis     chronic, stable.  Mucositis secondary to chemotherapy.  Continue Magic mouthwash as needed.    (5) Unintentional weight loss     Chronic, stable.  Continue high-calorie high-protein diet.  Recommend small frequent meals.    (6) Chemotherapy induce peripheral neuropathy     Chronic, stable.  Neurontin is helping however neuropathy is persistent.  Will continue Neurontin at 300 mg 3 times a day dose.  New prescription sent to the pharmacy.    (7) Suprapubic pain     New issue.  Patient has been reporting suprapubic pain with some dysuria this morning.  On exam she has mild tenderness on suprapubic region.  I checked a UA which was abnormal.  Will order urine culture and I will give her empiric trial of Bactrim.  If no improvement with Bactrim I will consider imaging To make sure no evidence of recurrent cancer.  Prescription of Bactrim sent to the pharmacy.    8/8/2022      CC:

## 2022-08-08 NOTE — TELEPHONE ENCOUNTER
Rx Refill Note  Requested Prescriptions     Pending Prescriptions Disp Refills   • Eliquis 5 MG tablet tablet [Pharmacy Med Name: ELIQUIS   5MG] 60 tablet 2     Sig: TAKE 1 TABLET EVERY 12 HOURS      Last office visit with prescribing clinician: 8/5/2022      Next office visit with prescribing clinician: 8/19/2022            Migdalia Arnold  08/08/22, 10:39 CDT

## 2022-08-12 ENCOUNTER — OFFICE VISIT (OUTPATIENT)
Dept: ONCOLOGY | Facility: CLINIC | Age: 62
End: 2022-08-12

## 2022-08-12 VITALS
SYSTOLIC BLOOD PRESSURE: 109 MMHG | TEMPERATURE: 96.9 F | RESPIRATION RATE: 18 BRPM | HEART RATE: 80 BPM | DIASTOLIC BLOOD PRESSURE: 72 MMHG | BODY MASS INDEX: 21.61 KG/M2 | WEIGHT: 122 LBS | OXYGEN SATURATION: 96 %

## 2022-08-12 DIAGNOSIS — K12.31 MUCOSITIS DUE TO ANTINEOPLASTIC THERAPY: ICD-10-CM

## 2022-08-12 DIAGNOSIS — G89.3 CANCER ASSOCIATED PAIN: ICD-10-CM

## 2022-08-12 DIAGNOSIS — C50.919 MALIGNANT NEOPLASM OF FEMALE BREAST, UNSPECIFIED ESTROGEN RECEPTOR STATUS, UNSPECIFIED LATERALITY, UNSPECIFIED SITE OF BREAST: Primary | ICD-10-CM

## 2022-08-12 DIAGNOSIS — I82.A12 ACUTE DEEP VEIN THROMBOSIS (DVT) OF AXILLARY VEIN OF LEFT UPPER EXTREMITY: ICD-10-CM

## 2022-08-12 PROCEDURE — 99214 OFFICE O/P EST MOD 30 MIN: CPT | Performed by: INTERNAL MEDICINE

## 2022-08-12 PROCEDURE — G0463 HOSPITAL OUTPT CLINIC VISIT: HCPCS | Performed by: INTERNAL MEDICINE

## 2022-08-12 NOTE — PROGRESS NOTES
Subjective     Radha Duarte was seen in follow-up for metastatic breast cancer.  She was added on my schedule as she is reporting worsening right arm and right chest wall pain.  She is starting cycle 2 Xeloda from today.  Continue take Eliquis.  No bleeding concerns.  Pain has been manageable on pain medication.  No nausea or emesis.    Past Medical History, Past Surgical History, Social History, Family History have been reviewed and are without significant changes except as mentioned.        Medications:  The current medication list was reviewed in the EMR    ALLERGIES:    Allergies   Allergen Reactions   • Percocet [Oxycodone-Acetaminophen] Nausea Only       Objective      Vitals:    08/12/22 1115   BP: 109/72   Pulse: 80   Resp: 18   Temp: 96.9 °F (36.1 °C)   SpO2: 96%   Weight: 55.3 kg (122 lb)   PainSc: 4  Comment: rt arm     Current Status 6/7/2022   ECOG score 0       Physical Exam  Vitals and nursing note reviewed.   Constitutional:       Appearance: Normal appearance.   Musculoskeletal:      Comments: Right arm swelling has been stable, right forearm swelling slightly worse.  Lymphedema involving right arm and right chest wall.  No palpable adenopathy.  Tenderness in the axilla.   Neurological:      Mental Status: She is alert.   Psychiatric:         Behavior: Behavior normal.         Thought Content: Thought content normal.               RECENT LABS:Independently reviewed and summarized  Hematology WBC   Date Value Ref Range Status   08/05/2022 4.63 3.40 - 10.80 10*3/mm3 Final     RBC   Date Value Ref Range Status   08/05/2022 3.09 (L) 3.77 - 5.28 10*6/mm3 Final     Hemoglobin   Date Value Ref Range Status   08/05/2022 10.4 (L) 12.0 - 15.9 g/dL Final     Hematocrit   Date Value Ref Range Status   08/05/2022 31.0 (L) 34.0 - 46.6 % Final     Platelets   Date Value Ref Range Status   08/05/2022 175 140 - 450 10*3/mm3 Final       Lab Results   Component Value Date    GLUCOSE 119 (H) 08/05/2022     BUN 12 08/05/2022    CREATININE 0.57 08/05/2022    BCR 21.1 08/05/2022    K 3.7 08/05/2022    CO2 26.0 08/05/2022    CALCIUM 9.0 08/05/2022    ALBUMIN 3.70 08/05/2022    AST 17 08/05/2022    ALT 8 08/05/2022            Radha Duarte reports a pain score of 4.  Given her pain assessment as noted, treatment options were discussed and the following options were decided upon as a follow-up plan to address the patient's pain: prescription for opiod analgesics.    Patient screened negative for depression based on a PHQ-9 score of 0 on 8/12/2022.       Diagnosis:   (1) Metastatic breast cancer with distant LN (mediastinal) metastases   Triple negative   Unable to do perform foundation one due to limited tissue      Current therapy:   Weekly carboplatin/paclitaxel     (4/22/22- 7/7/22)      Discontinue to pain peripheral neuropathy in lower extremities.      Switch to XELODA (7//22/22)      (2) Cancer associated pain   (3) Right upper extremity DVT   (4) Mucositis   (5) Unintentional weight loss   (6) Chemotherapy induce peripheral neuropathy   (7) Suprapubic pain     Assessment & Plan       (1) Metastatic breast cancer with distant LN (mediastinal) metastases     Chronic, stable.  Patient is reporting worsening right arm pain and swelling.  On exam, her right arm feels the same however right forearm slightly worse.  There is tenderness in the right axilla along with lymphadenopathy without any definite palpable adenopathy.    Patient is quite concerned about cancer progression.    I discussed with patient that she is just starting her second cycle of capecitabine.  I want her to give it at least a week before we consider this is a failure of capecitabine.  I will also repeat her CT scan of chest to make sure no evidence of cancer progression.    If her pain and swelling continued to get worse next week or CT scan shows progression of cancer I will switch her to Trodelvy.      (2) Cancer associated pain     Chronic  issue with exacerbation.  Right arm pain is worse.  Concern for cancer progression.  Repeat CT scan of chest next week.    (3) Right upper extremity DVT     Chronic, stable.  Continue Eliquis.  No new concern for bleeding or thrombosis    (4) Mucositis     Chronic, stable.  Continue Magic mouthwash as needed.    (5) Unintentional weight loss     Chronic, stable.  Continue high-calorie high-protein diet.  Recommend small frequent meals.    (6) Chemotherapy induce peripheral neuropathy     Chronic, stable.  Continue Neurontin.    (7) Suprapubic pain   Suprapubic pain has improved after antibiotic treatment.  Closely monitor.          8/12/2022      CC:           How Severe Is Your Cyst?: mild Is This A New Presentation, Or A Follow-Up?: Cyst

## 2022-08-17 ENCOUNTER — HOSPITAL ENCOUNTER (OUTPATIENT)
Dept: CT IMAGING | Facility: HOSPITAL | Age: 62
Discharge: HOME | End: 2022-08-17
Admitting: INTERNAL MEDICINE

## 2022-08-17 ENCOUNTER — INFUSION (OUTPATIENT)
Dept: ONCOLOGY | Facility: HOSPITAL | Age: 62
End: 2022-08-17

## 2022-08-17 DIAGNOSIS — C50.919 MALIGNANT NEOPLASM OF FEMALE BREAST, UNSPECIFIED ESTROGEN RECEPTOR STATUS, UNSPECIFIED LATERALITY, UNSPECIFIED SITE OF BREAST: ICD-10-CM

## 2022-08-17 DIAGNOSIS — C50.919 MALIGNANT NEOPLASM OF FEMALE BREAST, UNSPECIFIED ESTROGEN RECEPTOR STATUS, UNSPECIFIED LATERALITY, UNSPECIFIED SITE OF BREAST: Primary | ICD-10-CM

## 2022-08-17 LAB
ALBUMIN SERPL-MCNC: 3.9 G/DL (ref 3.5–5.2)
ALBUMIN/GLOB SERPL: 1.4 G/DL
ALP SERPL-CCNC: 58 U/L (ref 39–117)
ALT SERPL W P-5'-P-CCNC: 10 U/L (ref 1–33)
ANION GAP SERPL CALCULATED.3IONS-SCNC: 7 MMOL/L (ref 5–15)
AST SERPL-CCNC: 19 U/L (ref 1–32)
BASOPHILS # BLD AUTO: 0.03 10*3/MM3 (ref 0–0.2)
BASOPHILS NFR BLD AUTO: 0.5 % (ref 0–1.5)
BILIRUB SERPL-MCNC: 0.5 MG/DL (ref 0–1.2)
BUN SERPL-MCNC: 15 MG/DL (ref 8–23)
BUN/CREAT SERPL: 27.8 (ref 7–25)
CALCIUM SPEC-SCNC: 9.3 MG/DL (ref 8.6–10.5)
CHLORIDE SERPL-SCNC: 105 MMOL/L (ref 98–107)
CO2 SERPL-SCNC: 28 MMOL/L (ref 22–29)
CREAT SERPL-MCNC: 0.54 MG/DL (ref 0.57–1)
DEPRECATED RDW RBC AUTO: 60 FL (ref 37–54)
EGFRCR SERPLBLD CKD-EPI 2021: 103 ML/MIN/1.73
EOSINOPHIL # BLD AUTO: 0.08 10*3/MM3 (ref 0–0.4)
EOSINOPHIL NFR BLD AUTO: 1.4 % (ref 0.3–6.2)
ERYTHROCYTE [DISTWIDTH] IN BLOOD BY AUTOMATED COUNT: 16.3 % (ref 12.3–15.4)
GLOBULIN UR ELPH-MCNC: 2.7 GM/DL
GLUCOSE SERPL-MCNC: 96 MG/DL (ref 65–99)
HCT VFR BLD AUTO: 30.3 % (ref 34–46.6)
HGB BLD-MCNC: 10.2 G/DL (ref 12–15.9)
IMM GRANULOCYTES # BLD AUTO: 0.02 10*3/MM3 (ref 0–0.05)
IMM GRANULOCYTES NFR BLD AUTO: 0.3 % (ref 0–0.5)
LYMPHOCYTES # BLD AUTO: 1.03 10*3/MM3 (ref 0.7–3.1)
LYMPHOCYTES NFR BLD AUTO: 17.4 % (ref 19.6–45.3)
MCH RBC QN AUTO: 33.7 PG (ref 26.6–33)
MCHC RBC AUTO-ENTMCNC: 33.7 G/DL (ref 31.5–35.7)
MCV RBC AUTO: 100 FL (ref 79–97)
MONOCYTES # BLD AUTO: 0.48 10*3/MM3 (ref 0.1–0.9)
MONOCYTES NFR BLD AUTO: 8.1 % (ref 5–12)
NEUTROPHILS NFR BLD AUTO: 4.27 10*3/MM3 (ref 1.7–7)
NEUTROPHILS NFR BLD AUTO: 72.3 % (ref 42.7–76)
NRBC BLD AUTO-RTO: 0 /100 WBC (ref 0–0.2)
PLATELET # BLD AUTO: 141 10*3/MM3 (ref 140–450)
PMV BLD AUTO: 10 FL (ref 6–12)
POTASSIUM SERPL-SCNC: 4.4 MMOL/L (ref 3.5–5.2)
PROT SERPL-MCNC: 6.6 G/DL (ref 6–8.5)
RBC # BLD AUTO: 3.03 10*6/MM3 (ref 3.77–5.28)
SODIUM SERPL-SCNC: 140 MMOL/L (ref 136–145)
WBC NRBC COR # BLD: 5.91 10*3/MM3 (ref 3.4–10.8)

## 2022-08-17 PROCEDURE — 36591 DRAW BLOOD OFF VENOUS DEVICE: CPT | Performed by: INTERNAL MEDICINE

## 2022-08-17 PROCEDURE — 71260 CT THORAX DX C+: CPT

## 2022-08-17 PROCEDURE — 80053 COMPREHEN METABOLIC PANEL: CPT

## 2022-08-17 PROCEDURE — 25010000002 HEPARIN LOCK FLUSH PER 10 UNITS: Performed by: INTERNAL MEDICINE

## 2022-08-17 PROCEDURE — 25010000002 IOPAMIDOL 61 % SOLUTION: Performed by: INTERNAL MEDICINE

## 2022-08-17 PROCEDURE — 85025 COMPLETE CBC W/AUTO DIFF WBC: CPT

## 2022-08-17 RX ORDER — SODIUM CHLORIDE 0.9 % (FLUSH) 0.9 %
10 SYRINGE (ML) INJECTION AS NEEDED
Status: CANCELLED | OUTPATIENT
Start: 2022-08-17

## 2022-08-17 RX ORDER — HEPARIN SODIUM (PORCINE) LOCK FLUSH IV SOLN 100 UNIT/ML 100 UNIT/ML
500 SOLUTION INTRAVENOUS AS NEEDED
Status: CANCELLED | OUTPATIENT
Start: 2022-09-06

## 2022-08-17 RX ORDER — ONDANSETRON HYDROCHLORIDE 8 MG/1
TABLET, FILM COATED ORAL
Qty: 90 TABLET | Refills: 11 | Status: SHIPPED | OUTPATIENT
Start: 2022-08-17 | End: 2022-11-04

## 2022-08-17 RX ORDER — HEPARIN SODIUM (PORCINE) LOCK FLUSH IV SOLN 100 UNIT/ML 100 UNIT/ML
500 SOLUTION INTRAVENOUS AS NEEDED
Status: DISCONTINUED | OUTPATIENT
Start: 2022-08-17 | End: 2022-08-17 | Stop reason: HOSPADM

## 2022-08-17 RX ORDER — SODIUM CHLORIDE 0.9 % (FLUSH) 0.9 %
10 SYRINGE (ML) INJECTION AS NEEDED
Status: DISCONTINUED | OUTPATIENT
Start: 2022-08-17 | End: 2022-08-17 | Stop reason: HOSPADM

## 2022-08-17 RX ADMIN — HEPARIN 500 UNITS: 100 SYRINGE at 10:27

## 2022-08-17 RX ADMIN — IOPAMIDOL 90 ML: 612 INJECTION, SOLUTION INTRAVENOUS at 10:23

## 2022-08-17 NOTE — TELEPHONE ENCOUNTER
Rx Refill Note  Requested Prescriptions     Pending Prescriptions Disp Refills   • potassium chloride (K-DUR,KLOR-CON) 20 MEQ CR tablet 90 tablet 0     Sig: Take 1 tablet by mouth Daily.      Last office visit with prescribing clinician: 8/12/2022      Next office visit with prescribing clinician: 8/19/2022            Juliet Smith RN  08/17/22, 15:38 CDT

## 2022-08-17 NOTE — TELEPHONE ENCOUNTER
Rx Refill Note  Requested Prescriptions     Pending Prescriptions Disp Refills   • ondansetron (ZOFRAN) 8 MG tablet [Pharmacy Med Name: ONDANSETRON TABS 8MG] 90 tablet 11     Sig: TAKE 1 TABLET EVERY 8 HOURS AS NEEDED FOR VOMITING      Last office visit with prescribing clinician: 8/12/2022      Next office visit with prescribing clinician: 8/19/2022            Migdalia Arnold  08/17/22, 07:52 CDT

## 2022-08-18 RX ORDER — POTASSIUM CHLORIDE 20 MEQ/1
20 TABLET, EXTENDED RELEASE ORAL DAILY
Qty: 90 TABLET | Refills: 0 | Status: SHIPPED | OUTPATIENT
Start: 2022-08-18 | End: 2022-09-07 | Stop reason: SDUPTHER

## 2022-08-19 ENCOUNTER — APPOINTMENT (OUTPATIENT)
Dept: ONCOLOGY | Facility: HOSPITAL | Age: 62
End: 2022-08-19

## 2022-08-19 ENCOUNTER — OFFICE VISIT (OUTPATIENT)
Dept: ONCOLOGY | Facility: CLINIC | Age: 62
End: 2022-08-19

## 2022-08-19 VITALS
DIASTOLIC BLOOD PRESSURE: 69 MMHG | WEIGHT: 122.3 LBS | BODY MASS INDEX: 21.66 KG/M2 | TEMPERATURE: 97.6 F | OXYGEN SATURATION: 98 % | SYSTOLIC BLOOD PRESSURE: 120 MMHG | HEART RATE: 77 BPM | RESPIRATION RATE: 18 BRPM

## 2022-08-19 DIAGNOSIS — K12.31 MUCOSITIS DUE TO ANTINEOPLASTIC THERAPY: ICD-10-CM

## 2022-08-19 DIAGNOSIS — T45.1X5A CHEMOTHERAPY-INDUCED PERIPHERAL NEUROPATHY: ICD-10-CM

## 2022-08-19 DIAGNOSIS — I82.A12 ACUTE DEEP VEIN THROMBOSIS (DVT) OF AXILLARY VEIN OF LEFT UPPER EXTREMITY: ICD-10-CM

## 2022-08-19 DIAGNOSIS — G62.0 CHEMOTHERAPY-INDUCED PERIPHERAL NEUROPATHY: ICD-10-CM

## 2022-08-19 DIAGNOSIS — C50.919 MALIGNANT NEOPLASM OF FEMALE BREAST, UNSPECIFIED ESTROGEN RECEPTOR STATUS, UNSPECIFIED LATERALITY, UNSPECIFIED SITE OF BREAST: Primary | ICD-10-CM

## 2022-08-19 DIAGNOSIS — G89.3 CANCER ASSOCIATED PAIN: ICD-10-CM

## 2022-08-19 PROCEDURE — G0463 HOSPITAL OUTPT CLINIC VISIT: HCPCS | Performed by: INTERNAL MEDICINE

## 2022-08-19 PROCEDURE — 99214 OFFICE O/P EST MOD 30 MIN: CPT | Performed by: INTERNAL MEDICINE

## 2022-08-19 RX ORDER — OMEPRAZOLE 20 MG/1
CAPSULE, DELAYED RELEASE ORAL
Qty: 90 CAPSULE | Refills: 7 | Status: SHIPPED | OUTPATIENT
Start: 2022-08-19 | End: 2023-01-20 | Stop reason: SDUPTHER

## 2022-08-19 RX ORDER — GABAPENTIN 300 MG/1
300 CAPSULE ORAL 3 TIMES DAILY
Qty: 90 CAPSULE | Refills: 0 | Status: SHIPPED | OUTPATIENT
Start: 2022-08-19 | End: 2022-09-21 | Stop reason: SDUPTHER

## 2022-08-19 RX ORDER — CAPECITABINE 500 MG/1
1500 TABLET, FILM COATED ORAL 2 TIMES DAILY
Qty: 84 TABLET | Refills: 5 | Status: SHIPPED | OUTPATIENT
Start: 2022-08-19 | End: 2022-09-21

## 2022-08-19 NOTE — TELEPHONE ENCOUNTER
Rx Refill Note  Requested Prescriptions     Pending Prescriptions Disp Refills   • omeprazole (priLOSEC) 20 MG capsule [Pharmacy Med Name: OMEPRAZOLE DR CAPS 20MG] 90 capsule 7     Sig: TAKE 1 CAPSULE TWICE A DAY      Last office visit with prescribing clinician: 8/12/2022      Next office visit with prescribing clinician: 8/19/2022            Migdalia Arnold  08/19/22, 07:56 CDT

## 2022-08-19 NOTE — PROGRESS NOTES
Subjective     Radha Duarte was seen in follow up for metastatic breast cancer.   She has been stable since last visit.  Chronic right upper extremity swelling and pain has stable.  She is feeling more energetic.  Her pain is well controlled on current pain medications.   She continues to take Eliquis.  No new concern for bleeding or thrombosis.    Past Medical History, Past Surgical History, Social History, Family History have been reviewed and are without significant changes except as mentioned.        Medications:  The current medication list was reviewed in the EMR    ALLERGIES:    Allergies   Allergen Reactions   • Percocet [Oxycodone-Acetaminophen] Nausea Only       Objective      Vitals:    08/19/22 0934   BP: 120/69   Pulse: 77   Resp: 18   Temp: 97.6 °F (36.4 °C)   SpO2: 98%         Current Status 6/7/2022   ECOG score 0       Physical Exam  Vitals and nursing note reviewed.   Constitutional:       Appearance: Normal appearance.   Abdominal:      General: There is no distension.      Palpations: Abdomen is soft. There is no mass.      Tenderness: There is no abdominal tenderness.   Musculoskeletal:      Comments: Right UE edema + , stable.    Neurological:      General: No focal deficit present.      Mental Status: She is alert and oriented to person, place, and time. Mental status is at baseline.   Psychiatric:         Mood and Affect: Mood normal.         Behavior: Behavior normal.         Thought Content: Thought content normal.               RECENT LABS:Independently reviewed and summarized  Hematology WBC   Date Value Ref Range Status   08/17/2022 5.91 3.40 - 10.80 10*3/mm3 Final     RBC   Date Value Ref Range Status   08/17/2022 3.03 (L) 3.77 - 5.28 10*6/mm3 Final     Hemoglobin   Date Value Ref Range Status   08/17/2022 10.2 (L) 12.0 - 15.9 g/dL Final     Hematocrit   Date Value Ref Range Status   08/17/2022 30.3 (L) 34.0 - 46.6 % Final     Platelets   Date Value Ref Range Status    08/17/2022 141 140 - 450 10*3/mm3 Final     Lab Results   Component Value Date    GLUCOSE 96 08/17/2022    BUN 15 08/17/2022    CREATININE 0.54 (L) 08/17/2022    BCR 27.8 (H) 08/17/2022    K 4.4 08/17/2022    CO2 28.0 08/17/2022    CALCIUM 9.3 08/17/2022    ALBUMIN 3.90 08/17/2022    AST 19 08/17/2022    ALT 10 08/17/2022          Imaging (independently reviewed and summarized):     CT Chest With Contrast Diagnostic    Result Date: 8/19/2022   IMPRESSION: Questionable 0.8 cm noncalcified, partly solid lung nodular focus in the left lower lobe best seen on sagittal image 20. Recommend three month follow-up chest CT. There are a few nodular foci in the subcutaneous tissues of the right lateral breast, right axilla and right lateral chest wall with the largest measures 0.8 x 0.9 cm, which could be due to postoperative, infectious, inflammatory, or neoplastic etiologies. There is right breast skin thickening probably related to cancer treatment changes. Electronically signed by:  Edu Pichardo MD  8/19/2022 8:34 AM CDT Workstation: [id number]      Radha Duarte reports a pain score of 0.  Given her pain assessment as noted, treatment options were discussed and the following options were decided upon as a follow-up plan to address the patient's pain: continuation of current treatment plan for pain.    Patient screened negative for depression based on a PHQ-9 score of 0 on 8/19/2022.    Diagnosis:    (1) Metastatic breast cancer with distant LN (mediastinal) metastases   Triple negative   Unable to do perform foundation one due to limited tissue      Current therapy:   Weekly carboplatin/paclitaxel     (4/22/22- 7/7/22)      Discontinue to pain peripheral neuropathy in lower extremities.      Switch to XELODA (7//22/22)      (2) Cancer associated pain   (3) Right upper extremity DVT   (4) Mucositis   (5) Unintentional weight loss   (6) Chemotherapy induce peripheral neuropathy       Assessment & Plan     (1)  Metastatic breast cancer with distant LN (mediastinal) metastases     Chronic, stable.     Patient currently on palliative Xeloda.  She is on day 8 cycle 2.  She is tolerating treatment well without any major side effects.  No nausea or emesis.  No diarrhea.  Right upper extremity swelling has been stable.    Result of CT scan of chest reviewed.  Small 0.8 cm pulmonary nodule, few nodular subcutaneous foci in the right upper extremity, chest wall.    Clinically, she seems to have stable disease.    She is tolerating chemotherapy well.  I recommend we continue Xeloda.  New prescription sent to the pharmacy.    I will plan to see her back in 2 weeks with CBC, CMP.    (2) Cancer associated pain     Chronic, stable.   Continue MS Contin 30 mg bid.   She will continue this.     (3) Right upper extremity DVT     Chronic, stable.   On eliquis.   No new concern for bleeding or thrombosis.   Continue eliquis.     (4) Mucositis     Chronic, stable.   Continue magic mouthwash as needed.     (5) Unintentional weight loss     Chronic, stable.   Continue high calorie high protein diet.     (6) Chemotherapy induce peripheral neuropathy     Chronic, stable.   Continue neurontin 300 mg TID.   She is requesting new prescription which was sent to the pharmacy.      8/19/2022      CC:

## 2022-09-02 ENCOUNTER — APPOINTMENT (OUTPATIENT)
Dept: ONCOLOGY | Facility: HOSPITAL | Age: 62
End: 2022-09-02

## 2022-09-06 ENCOUNTER — OFFICE VISIT (OUTPATIENT)
Dept: ONCOLOGY | Facility: CLINIC | Age: 62
End: 2022-09-06

## 2022-09-06 ENCOUNTER — LAB (OUTPATIENT)
Dept: ONCOLOGY | Facility: HOSPITAL | Age: 62
End: 2022-09-06

## 2022-09-06 VITALS
BODY MASS INDEX: 21.95 KG/M2 | TEMPERATURE: 97 F | OXYGEN SATURATION: 96 % | HEART RATE: 91 BPM | RESPIRATION RATE: 18 BRPM | DIASTOLIC BLOOD PRESSURE: 63 MMHG | SYSTOLIC BLOOD PRESSURE: 152 MMHG | WEIGHT: 123.9 LBS

## 2022-09-06 DIAGNOSIS — T45.1X5A CHEMOTHERAPY-INDUCED PERIPHERAL NEUROPATHY: ICD-10-CM

## 2022-09-06 DIAGNOSIS — G62.0 CHEMOTHERAPY-INDUCED PERIPHERAL NEUROPATHY: ICD-10-CM

## 2022-09-06 DIAGNOSIS — C50.919 MALIGNANT NEOPLASM OF FEMALE BREAST, UNSPECIFIED ESTROGEN RECEPTOR STATUS, UNSPECIFIED LATERALITY, UNSPECIFIED SITE OF BREAST: Primary | ICD-10-CM

## 2022-09-06 DIAGNOSIS — G89.3 CANCER ASSOCIATED PAIN: ICD-10-CM

## 2022-09-06 DIAGNOSIS — I82.A12 ACUTE DEEP VEIN THROMBOSIS (DVT) OF AXILLARY VEIN OF LEFT UPPER EXTREMITY: ICD-10-CM

## 2022-09-06 LAB
ALBUMIN SERPL-MCNC: 4 G/DL (ref 3.5–5.2)
ALBUMIN/GLOB SERPL: 1.6 G/DL
ALP SERPL-CCNC: 69 U/L (ref 39–117)
ALT SERPL W P-5'-P-CCNC: 11 U/L (ref 1–33)
ANION GAP SERPL CALCULATED.3IONS-SCNC: 7 MMOL/L (ref 5–15)
AST SERPL-CCNC: 20 U/L (ref 1–32)
BASOPHILS # BLD AUTO: 0.03 10*3/MM3 (ref 0–0.2)
BASOPHILS NFR BLD AUTO: 0.6 % (ref 0–1.5)
BILIRUB SERPL-MCNC: 0.6 MG/DL (ref 0–1.2)
BUN SERPL-MCNC: 13 MG/DL (ref 8–23)
BUN/CREAT SERPL: 19.4 (ref 7–25)
CALCIUM SPEC-SCNC: 9.3 MG/DL (ref 8.6–10.5)
CHLORIDE SERPL-SCNC: 107 MMOL/L (ref 98–107)
CO2 SERPL-SCNC: 28 MMOL/L (ref 22–29)
CREAT SERPL-MCNC: 0.67 MG/DL (ref 0.57–1)
DEPRECATED RDW RBC AUTO: 63.9 FL (ref 37–54)
EGFRCR SERPLBLD CKD-EPI 2021: 97.7 ML/MIN/1.73
EOSINOPHIL # BLD AUTO: 0.08 10*3/MM3 (ref 0–0.4)
EOSINOPHIL NFR BLD AUTO: 1.6 % (ref 0.3–6.2)
ERYTHROCYTE [DISTWIDTH] IN BLOOD BY AUTOMATED COUNT: 17 % (ref 12.3–15.4)
GLOBULIN UR ELPH-MCNC: 2.5 GM/DL
GLUCOSE SERPL-MCNC: 117 MG/DL (ref 65–99)
HCT VFR BLD AUTO: 31.9 % (ref 34–46.6)
HGB BLD-MCNC: 10.8 G/DL (ref 12–15.9)
IMM GRANULOCYTES # BLD AUTO: 0.01 10*3/MM3 (ref 0–0.05)
IMM GRANULOCYTES NFR BLD AUTO: 0.2 % (ref 0–0.5)
LYMPHOCYTES # BLD AUTO: 1 10*3/MM3 (ref 0.7–3.1)
LYMPHOCYTES NFR BLD AUTO: 19.5 % (ref 19.6–45.3)
MCH RBC QN AUTO: 34.8 PG (ref 26.6–33)
MCHC RBC AUTO-ENTMCNC: 33.9 G/DL (ref 31.5–35.7)
MCV RBC AUTO: 102.9 FL (ref 79–97)
MONOCYTES # BLD AUTO: 0.41 10*3/MM3 (ref 0.1–0.9)
MONOCYTES NFR BLD AUTO: 8 % (ref 5–12)
NEUTROPHILS NFR BLD AUTO: 3.59 10*3/MM3 (ref 1.7–7)
NEUTROPHILS NFR BLD AUTO: 70.1 % (ref 42.7–76)
NRBC BLD AUTO-RTO: 0 /100 WBC (ref 0–0.2)
PLATELET # BLD AUTO: 133 10*3/MM3 (ref 140–450)
PMV BLD AUTO: 9.8 FL (ref 6–12)
POTASSIUM SERPL-SCNC: 3.9 MMOL/L (ref 3.5–5.2)
PROT SERPL-MCNC: 6.5 G/DL (ref 6–8.5)
RBC # BLD AUTO: 3.1 10*6/MM3 (ref 3.77–5.28)
SODIUM SERPL-SCNC: 142 MMOL/L (ref 136–145)
WBC NRBC COR # BLD: 5.12 10*3/MM3 (ref 3.4–10.8)

## 2022-09-06 PROCEDURE — 25010000002 HEPARIN LOCK FLUSH PER 10 UNITS: Performed by: INTERNAL MEDICINE

## 2022-09-06 PROCEDURE — 36591 DRAW BLOOD OFF VENOUS DEVICE: CPT | Performed by: INTERNAL MEDICINE

## 2022-09-06 PROCEDURE — G0463 HOSPITAL OUTPT CLINIC VISIT: HCPCS | Performed by: INTERNAL MEDICINE

## 2022-09-06 PROCEDURE — 85025 COMPLETE CBC W/AUTO DIFF WBC: CPT

## 2022-09-06 PROCEDURE — 99214 OFFICE O/P EST MOD 30 MIN: CPT | Performed by: INTERNAL MEDICINE

## 2022-09-06 PROCEDURE — 80053 COMPREHEN METABOLIC PANEL: CPT

## 2022-09-06 RX ORDER — SODIUM CHLORIDE 0.9 % (FLUSH) 0.9 %
10 SYRINGE (ML) INJECTION AS NEEDED
Status: CANCELLED | OUTPATIENT
Start: 2022-09-06

## 2022-09-06 RX ORDER — HEPARIN SODIUM (PORCINE) LOCK FLUSH IV SOLN 100 UNIT/ML 100 UNIT/ML
500 SOLUTION INTRAVENOUS AS NEEDED
Status: DISCONTINUED | OUTPATIENT
Start: 2022-09-06 | End: 2022-09-06 | Stop reason: HOSPADM

## 2022-09-06 RX ORDER — HEPARIN SODIUM (PORCINE) LOCK FLUSH IV SOLN 100 UNIT/ML 100 UNIT/ML
500 SOLUTION INTRAVENOUS AS NEEDED
Status: CANCELLED | OUTPATIENT
Start: 2022-09-21

## 2022-09-06 RX ADMIN — HEPARIN 500 UNITS: 100 SYRINGE at 08:19

## 2022-09-06 NOTE — PROGRESS NOTES
Subjective     Radha Duarte was seen in follow-up for metastatic breast cancer.  She has been stable since last visit.  Right upper extremity pain and swelling has remained stable.  Her pain is well controlled on pain medication.  Remains on Eliquis for right upper extremity DVT.  No new concern for bleeding or thrombosis.  Tolerating oral chemotherapy well without any major side effects.    Past Medical History, Past Surgical History, Social History, Family History have been reviewed and are without significant changes except as mentioned.        Medications:  The current medication list was reviewed in the EMR    ALLERGIES:    Allergies   Allergen Reactions   • Percocet [Oxycodone-Acetaminophen] Nausea Only       Objective      Vitals:    09/06/22 0901   BP: 152/63   Pulse: 91   Resp: 18   Temp: 97 °F (36.1 °C)   SpO2: 96%   Weight: 56.2 kg (123 lb 14.4 oz)   PainSc: 0-No pain     Current Status 6/7/2022   ECOG score 0       Physical Exam  Vitals and nursing note reviewed.   Constitutional:       Appearance: Normal appearance.   Musculoskeletal:      Comments: Right upper extremity swelling stable, 2 soft tissue nodule in right axilla size of a pea, 1 anteriorly, 1 posteriorly   Skin:     Coloration: Skin is pale.      Findings: No bruising or rash.   Neurological:      General: No focal deficit present.      Mental Status: She is alert and oriented to person, place, and time. Mental status is at baseline.   Psychiatric:         Mood and Affect: Mood normal.         Behavior: Behavior normal.         Thought Content: Thought content normal.               RECENT LABS:Independently reviewed and summarized  Hematology WBC   Date Value Ref Range Status   09/06/2022 5.12 3.40 - 10.80 10*3/mm3 Final     RBC   Date Value Ref Range Status   09/06/2022 3.10 (L) 3.77 - 5.28 10*6/mm3 Final     Hemoglobin   Date Value Ref Range Status   09/06/2022 10.8 (L) 12.0 - 15.9 g/dL Final     Hematocrit   Date Value Ref  Range Status   09/06/2022 31.9 (L) 34.0 - 46.6 % Final     Platelets   Date Value Ref Range Status   09/06/2022 133 (L) 140 - 450 10*3/mm3 Final     Lab Results   Component Value Date    GLUCOSE 117 (H) 09/06/2022    BUN 13 09/06/2022    CREATININE 0.67 09/06/2022    BCR 19.4 09/06/2022    K 3.9 09/06/2022    CO2 28.0 09/06/2022    CALCIUM 9.3 09/06/2022    ALBUMIN 4.00 09/06/2022    AST 20 09/06/2022    ALT 11 09/06/2022            Radha Duarte reports a pain score of 0.  Given her pain assessment as noted, treatment options were discussed and the following options were decided upon as a follow-up plan to address the patient's pain: continuation of current treatment plan for pain.    Patient screened negative  for depression based on a PHQ-9 score of 0 on 9/6/2022.     Diagnosis:    (1) Metastatic breast cancer with distant LN (mediastinal) metastases   Triple negative   Unable to do perform foundation one due to limited tissue      Current therapy:   Weekly carboplatin/paclitaxel     (4/22/22- 7/7/22)      Discontinue to pain peripheral neuropathy in lower extremities.      Switch to XELODA (7//22/22)      (2) Cancer associated pain   (3) Right upper extremity DVT   (4) Mucositis   (5) Unintentional weight loss   (6) Chemotherapy induce peripheral neuropathy     Assessment & Plan     (1) Metastatic breast cancer with distant LN (mediastinal) metastases     She remains on palliative Xeloda.  She started cycle 3 Xeloda last week.  Tolerating treatment well without any major side effect.  No nausea or emesis.  No hand-foot skin reaction.  No diarrhea.    Blood counts are stable.    On exam she seems to have stable disease.  She does have 2 soft tissue nodule in the right axilla one anteriorly and posteriorly.  Will monitor closely.    Recommend continue Xeloda.  I will see her back in 2 weeks with CBC, CMP        (2) Cancer associated pain     Chronic, stable.  Continue MS Contin 30 mg twice daily.    (3)  Right upper extremity DVT     Chronic, stable.  She is on Eliquis.  No new concern for bleeding or thrombosis.  Continue Eliquis.    (4) Mucositis     Chronic, stable.  Continue Magic mouthwash as needed.    (5) Unintentional weight loss     Chronic, stable.  Continue high-calorie high-protein diet.    (6) Chemotherapy induce peripheral neuropathy     Chronic, stable.  Continue Neurontin 300 mg 3 times daily.  She will continue this.    9/6/2022      CC:

## 2022-09-07 RX ORDER — POTASSIUM CHLORIDE 20 MEQ/1
20 TABLET, EXTENDED RELEASE ORAL DAILY
Qty: 90 TABLET | Refills: 0 | Status: SHIPPED | OUTPATIENT
Start: 2022-09-07 | End: 2022-12-27

## 2022-09-07 NOTE — TELEPHONE ENCOUNTER
Rx Refill Note  Requested Prescriptions     Pending Prescriptions Disp Refills   • potassium chloride (K-DUR,KLOR-CON) 20 MEQ CR tablet 90 tablet 0     Sig: Take 1 tablet by mouth Daily.      Last office visit with prescribing clinician: 9/6/2022      Next office visit with prescribing clinician: 9/20/2022            Nasrin Zuniga RN  09/07/22, 15:06 CDT

## 2022-09-13 ENCOUNTER — OFFICE VISIT (OUTPATIENT)
Dept: ONCOLOGY | Facility: CLINIC | Age: 62
End: 2022-09-13

## 2022-09-13 VITALS
RESPIRATION RATE: 18 BRPM | TEMPERATURE: 97.2 F | SYSTOLIC BLOOD PRESSURE: 189 MMHG | HEART RATE: 92 BPM | WEIGHT: 123 LBS | OXYGEN SATURATION: 96 % | BODY MASS INDEX: 21.79 KG/M2 | DIASTOLIC BLOOD PRESSURE: 75 MMHG

## 2022-09-13 DIAGNOSIS — C50.919 MALIGNANT NEOPLASM OF FEMALE BREAST, UNSPECIFIED ESTROGEN RECEPTOR STATUS, UNSPECIFIED LATERALITY, UNSPECIFIED SITE OF BREAST: ICD-10-CM

## 2022-09-13 DIAGNOSIS — I82.A12 ACUTE DEEP VEIN THROMBOSIS (DVT) OF AXILLARY VEIN OF LEFT UPPER EXTREMITY: ICD-10-CM

## 2022-09-13 DIAGNOSIS — T45.1X5A CHEMOTHERAPY-INDUCED PERIPHERAL NEUROPATHY: Primary | ICD-10-CM

## 2022-09-13 DIAGNOSIS — G89.3 CANCER ASSOCIATED PAIN: ICD-10-CM

## 2022-09-13 DIAGNOSIS — G62.0 CHEMOTHERAPY-INDUCED PERIPHERAL NEUROPATHY: Primary | ICD-10-CM

## 2022-09-13 PROCEDURE — G0463 HOSPITAL OUTPT CLINIC VISIT: HCPCS | Performed by: INTERNAL MEDICINE

## 2022-09-13 PROCEDURE — 99214 OFFICE O/P EST MOD 30 MIN: CPT | Performed by: INTERNAL MEDICINE

## 2022-09-13 RX ORDER — MORPHINE SULFATE 15 MG/1
30 TABLET, FILM COATED, EXTENDED RELEASE ORAL 2 TIMES DAILY
Qty: 90 TABLET | Refills: 0 | Status: SHIPPED | OUTPATIENT
Start: 2022-09-13 | End: 2022-10-04 | Stop reason: SDUPTHER

## 2022-09-13 NOTE — PROGRESS NOTES
Subjective     Radha Duarte was seen in follow-up for metastatic breast cancer.  She was added on my schedule as she is reporting new right upper extremity, right breast pain, mass and worsening swelling.  Denies any fever or chills.  She remains on Eliquis.  Tolerating chemotherapy well without any major side effects.      Past Medical History, Past Surgical History, Social History, Family History have been reviewed and are without significant changes except as mentioned.        Medications:  The current medication list was reviewed in the EMR    ALLERGIES:    Allergies   Allergen Reactions   • Percocet [Oxycodone-Acetaminophen] Nausea Only       Objective      Vitals:    09/13/22 1117   BP: (!) 189/75   Pulse: 92   Resp: 18   Temp: 97.2 °F (36.2 °C)   SpO2: 96%   Weight: 55.8 kg (123 lb)   PainSc:   5     Current Status 6/7/2022   ECOG score 0       Physical Exam  Vitals and nursing note reviewed.   Constitutional:       Appearance: Normal appearance.   Skin:     Comments: Worsening right breast and right upper extremity swelling.  Multiple sub-cutaneous nodules now palpable in the right breast and right axilla.  On last exam she only had 2 sub cutaneous nodules palpable.  There is also a new right breast mass with tenderness.   Neurological:      General: No focal deficit present.      Mental Status: She is alert and oriented to person, place, and time. Mental status is at baseline.   Psychiatric:         Mood and Affect: Mood normal.         Behavior: Behavior normal.         Thought Content: Thought content normal.               RECENT LABS:Independently reviewed and summarized  Hematology WBC   Date Value Ref Range Status   09/06/2022 5.12 3.40 - 10.80 10*3/mm3 Final     RBC   Date Value Ref Range Status   09/06/2022 3.10 (L) 3.77 - 5.28 10*6/mm3 Final     Hemoglobin   Date Value Ref Range Status   09/06/2022 10.8 (L) 12.0 - 15.9 g/dL Final     Hematocrit   Date Value Ref Range Status   09/06/2022  31.9 (L) 34.0 - 46.6 % Final     Platelets   Date Value Ref Range Status   09/06/2022 133 (L) 140 - 450 10*3/mm3 Final     Lab Results   Component Value Date    GLUCOSE 117 (H) 09/06/2022    BUN 13 09/06/2022    CREATININE 0.67 09/06/2022    BCR 19.4 09/06/2022    K 3.9 09/06/2022    CO2 28.0 09/06/2022    CALCIUM 9.3 09/06/2022    ALBUMIN 4.00 09/06/2022    AST 20 09/06/2022    ALT 11 09/06/2022            Radha Duarte reports a pain score of 5.  Given her pain assessment as noted, treatment options were discussed and the following options were decided upon as a follow-up plan to address the patient's pain: prescription for opiod analgesics.    Patient screened negative for depression based on a PHQ-9 score of 0 on 9/13/2022.       Diagnosis:    (1) Metastatic breast cancer with distant LN (mediastinal) metastases   Triple negative   Unable to do perform foundation one due to limited tissue      Current therapy:   Weekly carboplatin/paclitaxel     (4/22/22- 7/7/22)      Discontinue to pain peripheral neuropathy in lower extremities.      Switch to XELODA (7//22/22)      (2) Cancer associated pain   (3) Right upper extremity DVT   (4) Mucositis   (5) Unintentional weight loss   (6) Chemotherapy induce peripheral neuropathy     Assessment & Plan     (1) Metastatic breast cancer with distant LN (mediastinal) metastases     Chronic issue with exacerbation.  She is reporting worsening right breast pain, swelling, right upper extremity pain and swelling.  On exam she has new breast mass as well as multiple subcutaneous nodule which were new compared to prior exam.  She is tolerating Xeloda well however I am concerned about local progression.    Most of her cancer is CT occult and I believe PET scan would be able to delineate the status of underlying malignancy much better.  If she has local progression we can consider palliative radiation therapy with continued Xeloda.  If she has multifocal progressive disease  will consider switching her systemic therapy to Trodelvy.    I will order PET scan.  I will arrange radiation oncology consultation.  Continue Xeloda.    (2) Cancer associated pain    Chronic, stable.  She is using MS Contin which is helping with pain.  New prescription of MS Contin sent to the pharmacy.    (3) Right upper extremity DVT     Chronic, stable.  She is stable on Eliquis.  No new concern for bleeding or thrombosis.  Worsening right upper extremity swelling likely from cancer progression.    (4) Mucositis     (5) Unintentional weight loss     (6) Chemotherapy induce peripheral neuropathy     Chronic, stable.  She is currently on Neurontin.  She will continue this as needed.      9/13/2022      CC:

## 2022-09-19 ENCOUNTER — HOSPITAL ENCOUNTER (OUTPATIENT)
Dept: PET IMAGING | Facility: HOSPITAL | Age: 62
Discharge: HOME | End: 2022-09-19
Admitting: INTERNAL MEDICINE

## 2022-09-19 DIAGNOSIS — C50.919 MALIGNANT NEOPLASM OF FEMALE BREAST, UNSPECIFIED ESTROGEN RECEPTOR STATUS, UNSPECIFIED LATERALITY, UNSPECIFIED SITE OF BREAST: ICD-10-CM

## 2022-09-19 PROCEDURE — 0 FLUDEOXYGLUCOSE F18 SOLUTION: Performed by: INTERNAL MEDICINE

## 2022-09-19 PROCEDURE — A9552 F18 FDG: HCPCS | Performed by: INTERNAL MEDICINE

## 2022-09-19 PROCEDURE — 78815 PET IMAGE W/CT SKULL-THIGH: CPT

## 2022-09-19 RX ADMIN — SODIUM FLUORIDE F 18 1 DOSE: 200 INJECTION, SOLUTION INTRAVENOUS at 15:12

## 2022-09-20 ENCOUNTER — APPOINTMENT (OUTPATIENT)
Dept: ONCOLOGY | Facility: HOSPITAL | Age: 62
End: 2022-09-20

## 2022-09-21 ENCOUNTER — INFUSION (OUTPATIENT)
Dept: ONCOLOGY | Facility: HOSPITAL | Age: 62
End: 2022-09-21

## 2022-09-21 ENCOUNTER — OFFICE VISIT (OUTPATIENT)
Dept: ONCOLOGY | Facility: CLINIC | Age: 62
End: 2022-09-21

## 2022-09-21 VITALS
BODY MASS INDEX: 22.3 KG/M2 | SYSTOLIC BLOOD PRESSURE: 156 MMHG | WEIGHT: 125.9 LBS | RESPIRATION RATE: 18 BRPM | OXYGEN SATURATION: 97 % | TEMPERATURE: 97.4 F | DIASTOLIC BLOOD PRESSURE: 74 MMHG | HEART RATE: 73 BPM

## 2022-09-21 DIAGNOSIS — K12.31 MUCOSITIS DUE TO ANTINEOPLASTIC THERAPY: ICD-10-CM

## 2022-09-21 DIAGNOSIS — L27.1 HAND FOOT SYNDROME: ICD-10-CM

## 2022-09-21 DIAGNOSIS — I82.A12 ACUTE DEEP VEIN THROMBOSIS (DVT) OF AXILLARY VEIN OF LEFT UPPER EXTREMITY: ICD-10-CM

## 2022-09-21 DIAGNOSIS — C50.919 MALIGNANT NEOPLASM OF FEMALE BREAST, UNSPECIFIED ESTROGEN RECEPTOR STATUS, UNSPECIFIED LATERALITY, UNSPECIFIED SITE OF BREAST: Primary | ICD-10-CM

## 2022-09-21 DIAGNOSIS — G62.0 CHEMOTHERAPY-INDUCED PERIPHERAL NEUROPATHY: ICD-10-CM

## 2022-09-21 DIAGNOSIS — T45.1X5A CHEMOTHERAPY-INDUCED PERIPHERAL NEUROPATHY: ICD-10-CM

## 2022-09-21 LAB
ALBUMIN SERPL-MCNC: 3.9 G/DL (ref 3.5–5.2)
ALBUMIN/GLOB SERPL: 1.8 G/DL
ALP SERPL-CCNC: 68 U/L (ref 39–117)
ALT SERPL W P-5'-P-CCNC: 7 U/L (ref 1–33)
ANION GAP SERPL CALCULATED.3IONS-SCNC: 10 MMOL/L (ref 5–15)
AST SERPL-CCNC: 16 U/L (ref 1–32)
BASOPHILS # BLD AUTO: 0.03 10*3/MM3 (ref 0–0.2)
BASOPHILS NFR BLD AUTO: 0.6 % (ref 0–1.5)
BILIRUB SERPL-MCNC: 0.4 MG/DL (ref 0–1.2)
BUN SERPL-MCNC: 10 MG/DL (ref 8–23)
BUN/CREAT SERPL: 15.9 (ref 7–25)
CALCIUM SPEC-SCNC: 8.9 MG/DL (ref 8.6–10.5)
CHLORIDE SERPL-SCNC: 107 MMOL/L (ref 98–107)
CO2 SERPL-SCNC: 25 MMOL/L (ref 22–29)
CREAT SERPL-MCNC: 0.63 MG/DL (ref 0.57–1)
DEPRECATED RDW RBC AUTO: 72.1 FL (ref 37–54)
EGFRCR SERPLBLD CKD-EPI 2021: 99.2 ML/MIN/1.73
EOSINOPHIL # BLD AUTO: 0.08 10*3/MM3 (ref 0–0.4)
EOSINOPHIL NFR BLD AUTO: 1.5 % (ref 0.3–6.2)
ERYTHROCYTE [DISTWIDTH] IN BLOOD BY AUTOMATED COUNT: 18.5 % (ref 12.3–15.4)
GLOBULIN UR ELPH-MCNC: 2.2 GM/DL
GLUCOSE SERPL-MCNC: 111 MG/DL (ref 65–99)
HCT VFR BLD AUTO: 29.9 % (ref 34–46.6)
HGB BLD-MCNC: 9.8 G/DL (ref 12–15.9)
HOLD SPECIMEN: NORMAL
IMM GRANULOCYTES # BLD AUTO: 0.02 10*3/MM3 (ref 0–0.05)
IMM GRANULOCYTES NFR BLD AUTO: 0.4 % (ref 0–0.5)
LYMPHOCYTES # BLD AUTO: 0.93 10*3/MM3 (ref 0.7–3.1)
LYMPHOCYTES NFR BLD AUTO: 17.9 % (ref 19.6–45.3)
MCH RBC QN AUTO: 35.4 PG (ref 26.6–33)
MCHC RBC AUTO-ENTMCNC: 32.8 G/DL (ref 31.5–35.7)
MCV RBC AUTO: 107.9 FL (ref 79–97)
MONOCYTES # BLD AUTO: 0.47 10*3/MM3 (ref 0.1–0.9)
MONOCYTES NFR BLD AUTO: 9.1 % (ref 5–12)
NEUTROPHILS NFR BLD AUTO: 3.66 10*3/MM3 (ref 1.7–7)
NEUTROPHILS NFR BLD AUTO: 70.5 % (ref 42.7–76)
NRBC BLD AUTO-RTO: 0 /100 WBC (ref 0–0.2)
PLATELET # BLD AUTO: 139 10*3/MM3 (ref 140–450)
PMV BLD AUTO: 9 FL (ref 6–12)
POTASSIUM SERPL-SCNC: 4 MMOL/L (ref 3.5–5.2)
PROT SERPL-MCNC: 6.1 G/DL (ref 6–8.5)
RBC # BLD AUTO: 2.77 10*6/MM3 (ref 3.77–5.28)
SODIUM SERPL-SCNC: 142 MMOL/L (ref 136–145)
WBC NRBC COR # BLD: 5.19 10*3/MM3 (ref 3.4–10.8)

## 2022-09-21 PROCEDURE — G0463 HOSPITAL OUTPT CLINIC VISIT: HCPCS | Performed by: INTERNAL MEDICINE

## 2022-09-21 PROCEDURE — 99214 OFFICE O/P EST MOD 30 MIN: CPT | Performed by: INTERNAL MEDICINE

## 2022-09-21 PROCEDURE — 85025 COMPLETE CBC W/AUTO DIFF WBC: CPT

## 2022-09-21 PROCEDURE — 36415 COLL VENOUS BLD VENIPUNCTURE: CPT

## 2022-09-21 PROCEDURE — 80053 COMPREHEN METABOLIC PANEL: CPT

## 2022-09-21 RX ORDER — HEPARIN SODIUM (PORCINE) LOCK FLUSH IV SOLN 100 UNIT/ML 100 UNIT/ML
500 SOLUTION INTRAVENOUS AS NEEDED
Status: CANCELLED | OUTPATIENT
Start: 2022-10-04

## 2022-09-21 RX ORDER — DOCUSATE SODIUM 100 MG/1
100 CAPSULE, LIQUID FILLED ORAL 2 TIMES DAILY
Qty: 90 CAPSULE | Refills: 0 | Status: SHIPPED | OUTPATIENT
Start: 2022-09-21

## 2022-09-21 RX ORDER — HEPARIN SODIUM (PORCINE) LOCK FLUSH IV SOLN 100 UNIT/ML 100 UNIT/ML
500 SOLUTION INTRAVENOUS AS NEEDED
Status: DISCONTINUED | OUTPATIENT
Start: 2022-09-21 | End: 2022-10-05 | Stop reason: HOSPADM

## 2022-09-21 RX ORDER — GABAPENTIN 100 MG/1
400 CAPSULE ORAL 3 TIMES DAILY
Qty: 90 CAPSULE | Refills: 0 | Status: SHIPPED | OUTPATIENT
Start: 2022-09-21 | End: 2022-10-03

## 2022-09-21 RX ORDER — SODIUM CHLORIDE 0.9 % (FLUSH) 0.9 %
10 SYRINGE (ML) INJECTION AS NEEDED
Status: CANCELLED | OUTPATIENT
Start: 2022-09-21

## 2022-09-21 RX ORDER — GABAPENTIN 300 MG/1
300 CAPSULE ORAL 3 TIMES DAILY
Qty: 90 CAPSULE | Refills: 0 | Status: SHIPPED | OUTPATIENT
Start: 2022-09-21 | End: 2022-09-21

## 2022-09-21 NOTE — PROGRESS NOTES
Rajendra Duarte was seen in follow-up for breast cancer.  We reviewed the result of PET scan which unfortunately showing progressive disease.  Treatment options discussed at length.  Overall pain has gotten slightly worse.  Remains on Eliquis.  No bleeding concerns.    Past Medical History, Past Surgical History, Social History, Family History have been reviewed and are without significant changes except as mentioned.        Medications:  The current medication list was reviewed in the EMR    ALLERGIES:    Allergies   Allergen Reactions   • Percocet [Oxycodone-Acetaminophen] Nausea Only       Objective      Vitals:    09/21/22 0959   BP: 156/74   Pulse: 73   Resp: 18   Temp: 97.4 °F (36.3 °C)   SpO2: 97%   Weight: 57.1 kg (125 lb 14.4 oz)   PainSc: 0-No pain     Current Status 6/7/2022   ECOG score 0       Physical Exam  Vitals and nursing note reviewed.   Constitutional:       Appearance: Normal appearance.   Musculoskeletal:      Comments: Right upper extremity edema   Skin:     Comments: Redness involving bilateral hands, no skin desquamation no ulcers   Neurological:      Mental Status: She is alert.   Psychiatric:         Mood and Affect: Mood normal.         Behavior: Behavior normal.         Thought Content: Thought content normal.               RECENT LABS:Independently reviewed and summarized  Hematology WBC   Date Value Ref Range Status   09/21/2022 5.19 3.40 - 10.80 10*3/mm3 Final     RBC   Date Value Ref Range Status   09/21/2022 2.77 (L) 3.77 - 5.28 10*6/mm3 Final     Hemoglobin   Date Value Ref Range Status   09/21/2022 9.8 (L) 12.0 - 15.9 g/dL Final     Hematocrit   Date Value Ref Range Status   09/21/2022 29.9 (L) 34.0 - 46.6 % Final     Platelets   Date Value Ref Range Status   09/21/2022 139 (L) 140 - 450 10*3/mm3 Final     Lab Results   Component Value Date    GLUCOSE 111 (H) 09/21/2022    BUN 10 09/21/2022    CREATININE 0.63 09/21/2022    BCR 15.9 09/21/2022    K 4.0  09/21/2022    CO2 25.0 09/21/2022    CALCIUM 8.9 09/21/2022    ALBUMIN 3.90 09/21/2022    AST 16 09/21/2022    ALT 7 09/21/2022          Imaging (independently reviewed and summarized):     NM PET/CT Skull Base to Mid Thigh    Result Date: 9/21/2022  CONCLUSION: 1.  Large focus of hypermetabolic activity in the right breast, SUV max 5.3. 2.  Hypermetabolic focus in the right axilla with SUV max of 3.1 associated with a non-pathologically enlarged lymph node suspicious for metastatic disease. 3.  Hypermetabolic activity in the right subclavian region SUV max 3.0 suspicious for metastatic lymphadenopathy. 4.  There is skin thickening involving the right breast. 5.  Tree-in-bud opacities noted in the posterior periphery of the left lower lobe similar to the previous exams. Most commonly due to infectious bronchiolitis (acute: viral and bacterial (e.g. mycoplasma) or chronic: tuberculosis and nontuberculous mycobacterial infection). Electronically signed by:  Edu Pichardo MD  9/21/2022 9:50 AM CDT Workstation: [id number]      Diagnosis:    (1) Metastatic breast cancer with distant LN (mediastinal) metastases   Triple negative   Unable to do perform foundation one due to limited tissue      Current therapy:   Weekly carboplatin/paclitaxel     (4/22/22- 7/7/22)      Discontinue to pain peripheral neuropathy in lower extremities.      Switch to XELODA (7//22/22)     PET scan on 9/19/22 showed progressive disease      (2) Cancer associated pain   (3) Right upper extremity DVT   (4) Mucositis   (5) Unintentional weight loss   (6) Chemotherapy induce peripheral neuropathy     Assessment & Plan      (1) Metastatic breast cancer with distant LN (mediastinal) metastases     Chronic issue with exacerbation.  Result of PET scan reviewed which shows new right breast mass with PET avid right axillary and subclavian adenopathy.  There is no evidence of distant metastasis however.  Her case was discussed with radiation oncology and  patient is scheduled to see navid tomorrow.  If palliative radiation could be offered this would help to achieve disease control locally.  In the past she was unable to undergo radiation due to positioning issue at [facility].    If radiation treatment is not an option or when she finished her radiation treatment I will plan to start her on Trodelvy.  I would like to hold Trodelvy with radiation as we do not have data available for concomitant use.  She was taking Xeloda however hand-foot skin reaction so I recommend we continue holding Xeloda for now.    Will follow-up in 1 week.    (2) Cancer associated pain    Chronic, stable on MS Contin.  She will continue this.     (3) Right upper extremity DVT     Chronic, stable on Eliquis.  She will continue this as needed.  No new concern for bleeding or thrombosis.    (4) Mucositis     (5) Unintentional weight loss     Chronic, stable.  Continue high-calorie high-protein diet.    (6) Chemotherapy induce peripheral neuropathy     Chronic, stable.  She is on Neurontin.  New prescription of Neurontin was sent to the pharmacy.    9/21/2022      CC:

## 2022-09-22 ENCOUNTER — HOSPITAL ENCOUNTER (OUTPATIENT)
Dept: RADIATION ONCOLOGY | Facility: HOSPITAL | Age: 62
Setting detail: RADIATION/ONCOLOGY SERIES
End: 2022-09-22

## 2022-09-22 ENCOUNTER — CONSULT (OUTPATIENT)
Dept: RADIATION ONCOLOGY | Facility: HOSPITAL | Age: 62
End: 2022-09-22

## 2022-09-22 VITALS
WEIGHT: 128 LBS | BODY MASS INDEX: 22.67 KG/M2 | HEART RATE: 86 BPM | TEMPERATURE: 97.4 F | RESPIRATION RATE: 18 BRPM | OXYGEN SATURATION: 98 % | DIASTOLIC BLOOD PRESSURE: 84 MMHG | SYSTOLIC BLOOD PRESSURE: 138 MMHG

## 2022-09-22 DIAGNOSIS — C50.919 MALIGNANT NEOPLASM OF FEMALE BREAST, UNSPECIFIED ESTROGEN RECEPTOR STATUS, UNSPECIFIED LATERALITY, UNSPECIFIED SITE OF BREAST: Primary | ICD-10-CM

## 2022-09-22 PROCEDURE — 77263 THER RADIOLOGY TX PLNG CPLX: CPT | Performed by: STUDENT IN AN ORGANIZED HEALTH CARE EDUCATION/TRAINING PROGRAM

## 2022-09-22 PROCEDURE — 99205 OFFICE O/P NEW HI 60 MIN: CPT | Performed by: STUDENT IN AN ORGANIZED HEALTH CARE EDUCATION/TRAINING PROGRAM

## 2022-09-22 PROCEDURE — G0463 HOSPITAL OUTPT CLINIC VISIT: HCPCS | Performed by: STUDENT IN AN ORGANIZED HEALTH CARE EDUCATION/TRAINING PROGRAM

## 2022-09-22 RX ORDER — MULTIPLE VITAMINS W/ MINERALS TAB 9MG-400MCG
1 TAB ORAL DAILY
COMMUNITY

## 2022-09-22 NOTE — PROGRESS NOTES
Radiation Oncology Consult    Patient: Radha Duarte   YOB: 1960   Medical Record Number: [id number]   Date of Visit  September 22, 2022   Primary Diagnosis: Cancer Staging  Malignant neoplasm of female breast (HCC)  Staging form: Breast, AJCC 8th Edition  - Clinical stage from 5/15/2022: Stage IV (cT3, cN2a, pM1, G3, ER-, NH-, HER2-) - Signed by Trisha Meade MD on 5/15/2022      Treatment:  - carboplatin/paclitaxel 5/16/2022-6/7/2022  - capecitabine started 7/7/2022    Implanted Devices: none    History of Present Illness:  Ms. Duarte is a 62 y.o. female with metastatic right breast cancer. She presented in 11/2021 with a right chest wall mass. CT neck and chest in 2/2022 showed right axillary, retropectoral, and mediastinal lymphadenopathy. Right chest LN biopsy on 2/22/2022 showed poorly differentiated carcinoma. PET on 3/15/2022 showed a right subpectoral presumed primary with right axillary, IMN, BL supraclavicular, and mediastinal LAD. Breast MRI on 3/18/2022 showed a discontiguous mass with enhancement mainly in the superior right breast. Biopsy of the right breast on 3/22/2022 showed poorly differentiated carcinoma. She initially started on carboplatin/paclitaxel but had to discontinue due to neuropathy. Interim PET on 6/20/2022 showed no residual avidity. 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 Repeat PET on 9/19/2022 unfortunately showed avidity in the right breast, axilla, and subclavian regions. She also had significant new right arm and breast swelling and pain. She was referred for palliative radiation therapy.    Today she notes significant pain in her BL hands as well as right arm and breast pain and swelling. Denies dyspnea or other chest pain. Has chronic back pain. Taking morphine for pain. Having burning/shooting pains under right arm and aching in right neck. Denies abdominal pain or changes in bathroom habits.  Some headache and runny notes as well with occasional epistaxis. Denies recent falls.    The patient is a current smoker, up to 1 ppd.    This is a new problem to me, and one that is potentially life-threatening.  (Old medical records were requested, reviewed, and summarized in the HPI)    Review of Systems    All other systems reviewed and are negative.    Past Medical History:   Diagnosis Date   • Acute deep vein thrombosis (DVT) of brachial vein of right upper extremity (HCC) 03/2022   • Anemia    • Breast cancer (HCC) 02/22/2022   • Encounter for antineoplastic chemotherapy     2 cycles Taxol/Carbo before   • Hyperlipidemia    • Osteoporosis     neck, lower back   • Varicose veins of right lower extremity     hx blood clot in right lower calf - age 21        Past Surgical History:   Procedure Laterality Date   • BREAST BIOPSY Right 02/22/2022   • COLONOSCOPY N/A 03/09/2020    Procedure: COLONOSCOPY;  Surgeon: Bishop Rodriguez DO;  Location: [facility] ENDOSCOPY;  Service: Gastroenterology;  Laterality: N/A;   • MEDIPORT INSERTION, SINGLE Left 05/17/2022   • SHOULDER MANIPULATION Bilateral 2003    frozen shoulder   • VARICOSE VEIN SURGERY  1986      Family History   Problem Relation Age of Onset   • ALS Mother    • Ulcers Mother    • Heart disease Father    • Alcohol abuse Father    • Cancer Brother    • Stroke Brother    • COPD Brother    • Anuerysm Brother    • Heart disease Paternal Grandmother    • Diabetes Paternal Grandmother    • Stroke Paternal Aunt         Social History     Socioeconomic History   • Marital status:    • Number of children: 4   • Highest education level: GED or equivalent   Tobacco Use   • Smoking status: Current Every Day Smoker     Packs/day: 1.00     Years: 40.00     Pack years: 40.00     Types: Cigarettes     Start date: 1982   • Smokeless tobacco: Never Used   • Tobacco comment: Depends on day-sometimes a pack 3/4-1 pack, stopped 2 years   Vaping Use   • Vaping Use: Former   •  Substances: Nicotine, Flavoring, made throat dry, cough   • Devices: Pre-filled or refillable cartridge   Substance and Sexual Activity   • Alcohol use: Never   • Drug use: Never   • Sexual activity: Not Currently     Partners: Male        Allergies:  Percocet [oxycodone-acetaminophen]   Prior to Admission medications    Medication Sig Start Date End Date Taking? Authorizing Provider   Eliquis 5 MG tablet tablet TAKE 1 TABLET EVERY 12 HOURS 8/8/22  Yes Trisha Meade MD   gabapentin (NEURONTIN) 100 MG capsule Take 4 capsules by mouth 3 (Three) Times a Day. 9/21/22  Yes Trisha Meade MD   Morphine (MS CONTIN) 15 MG 12 hr tablet Take 2 tablets by mouth 2 (Two) Times a Day. 9/13/22  Yes Trisha Meade MD   multivitamin with minerals tablet tablet Take 1 tablet by mouth Daily.   Yes Omid Barber MD   omeprazole (priLOSEC) 20 MG capsule TAKE 1 CAPSULE TWICE A DAY 8/19/22  Yes Trisha eMade MD   ondansetron (ZOFRAN) 8 MG tablet TAKE 1 TABLET EVERY 8 HOURS AS NEEDED FOR VOMITING 8/17/22  Yes Trisha Meade MD   potassium chloride (K-DUR,KLOR-CON) 20 MEQ CR tablet Take 1 tablet by mouth Daily. 9/7/22  Yes Trisha Meade MD   Probiotic Product (PROBIOTIC DAILY PO) Take 1 dose by mouth Daily.   Yes Omid Barber MD   atorvastatin (LIPITOR) 20 MG tablet Take 20 mg by mouth Every Night.    Omid Barber MD   Calcium Carb-Cholecalciferol (CALCIUM/VITAMIN D PO) Take 1 tablet by mouth Daily.    Omid Barber MD   docusate sodium (Colace) 100 MG capsule Take 1 capsule by mouth 2 (Two) Times a Day.  Patient taking differently: Take 100 mg by mouth Daily. 9/21/22   Trisha Meade MD   DPH-Lido-AlHydr-MgHydr-Simeth (First Mouthwash, Magic Mouthwash,) suspension Swish and spit 5 mL Every 6 (Six) Hours. 5/16/22   Trisha Meade MD   HYDROcodone-acetaminophen (NORCO) 5-325 MG per tablet Take 1 tablet by mouth Every 6 (Six)  Hours As Needed for Severe Pain . 5/16/22   Trisha Meade MD   Ibandronate Sodium (BONIVA PO) Take 1 capsule by mouth Every 30 (Thirty) Days.    Provider, MD Omid   promethazine (PHENERGAN) 12.5 MG tablet Take 1 tablet by mouth Every 6 (Six) Hours As Needed for Nausea. 6/14/22   Trisha Meade MD      Pain:(on a scale of 0-10)    Pain Score    09/22/22 0842   PainSc:   4   PainLoc: Neck  Comment: under arm-burn, shootng pain, neck-ache, hands fro        Radha Duarte reports a pain score of 4.  Given her pain assessment as noted, treatment options were discussed and the following options were decided upon as a follow-up plan to address the patient's pain: continuation of current treatment plan for pain.       Quality of Life:   ECOG: (1) Restricted in physically strenuous activity, ambulatory and able to do work of light nature    Advance Directives (For Healthcare)  Advance Directive Status: Patient has advance directive, copy requested  Type of Advance Directive: Durable power of  for health care, Health care directive/Living will  Have you reviewed your Advance Directive and is it valid for this stay?: No  Literature Provided on Advance Directives: Yes  Patient Requests Assistance on Advance Directives: Yes, consult for Advance Care Planning (has one at [facility])    PHQ-9 Depression Screening:  Little interest or pleasure in doing things? 0-->not at all   Feeling down, depressed, or hopeless? 3-->nearly every day   Trouble falling or staying asleep, or sleeping too much? 0-->not at all   Feeling tired or having little energy? 3-->nearly every day   Poor appetite or overeating? 0-->not at all   Feeling bad about yourself - or that you are a failure or have let yourself or your family down? 0-->not at all   Trouble concentrating on things, such as reading the newspaper or watching television? 0-->not at all   Moving or speaking so slowly that other people  could have noticed? Or the opposite - being so fidgety or restless that you have been moving around a lot more than usual? 0-->not at all   Thoughts that you would be better off dead, or of hurting yourself in some way? 0-->not at all   PHQ-9 Total Score 6   If you checked off any problems, how difficult have these problems made it for you to do your work, take care of things at home, or get along with other people? very difficult    PHQ-9 Total Score: 6     Patient screened positive for depression based on a PHQ-9 score of 6 on 9/22/2022. Follow-up recommendations include: Elevated PHQ score reflective of acute illness, not depression.      Physical Examination:  Vitals:    09/22/22 0842   BP: 138/84   Pulse: 86   Resp: 18   Temp: 97.4 °F (36.3 °C)   SpO2: 98%     Wt Readings from Last 3 Encounters:   09/22/22 58.1 kg (128 lb)   09/21/22 57.1 kg (125 lb 14.4 oz)   09/13/22 55.8 kg (123 lb)     Body mass index is 22.67 kg/m².    Constitutional: The patient is a well-developed, well-nourished female in no acute distress.  HEENT: Normocephalic, atraumatic. EOMI. Nose and ears without abnormalities upon visual inspection.  Lymphatics: Palpable right supraclavicular and axillary lymphadenopathy.  CV: Regular rate and rhythm. No murmurs/rubs/gallops/clicks.  Respiratory: Lungs clear to auscultation bilaterally.  Breasts: Right breast with large palpable mass with overlying swelling and erythema. Left breast without abnormalities on inspection or palpation. Female nurse chaperone present for entire exam.  GI: Abdomen soft, nontender, nondistended, with no hepatosplenomegaly or masses palpated. Bowel sounds normoactive.  Musculoskeletal: Spine and hips nontender to palpation. Entire right upper extremity with 3+ pitting edema and erythema. Left arm without significant edema.  Skin: Palpable small nodular skin lesions with erythema on right lateral breast.  Neurologic: Cranial nerves grossly intact, with no focal  neurological deficits noted on exam.  Psychiatric: Alert and oriented. Depressed affect appropriate given acute disease process.      Imaging:  CT Chest With Contrast Diagnostic    Result Date: 8/19/2022   IMPRESSION: Questionable 0.8 cm noncalcified, partly solid lung nodular focus in the left lower lobe best seen on sagittal image 20. Recommend three month follow-up chest CT. There are a few nodular foci in the subcutaneous tissues of the right lateral breast, right axilla and right lateral chest wall with the largest measures 0.8 x 0.9 cm, which could be due to postoperative, infectious, inflammatory, or neoplastic etiologies. There is right breast skin thickening probably related to cancer treatment changes. Electronically signed by:  Edu Pichardo MD  8/19/2022 8:34 AM CDT Workstation: [id number]    NM PET/CT Skull Base to Mid Thigh    Result Date: 9/21/2022  CONCLUSION: 1.  Large focus of hypermetabolic activity in the right breast, SUV max 5.3. 2.  Hypermetabolic focus in the right axilla with SUV max of 3.1 associated with a non-pathologically enlarged lymph node suspicious for metastatic disease. 3.  Hypermetabolic activity in the right subclavian region SUV max 3.0 suspicious for metastatic lymphadenopathy. 4.  There is skin thickening involving the right breast. 5.  Tree-in-bud opacities noted in the posterior periphery of the left lower lobe similar to the previous exams. Most commonly due to infectious bronchiolitis (acute: viral and bacterial (e.g. mycoplasma) or chronic: tuberculosis and nontuberculous mycobacterial infection). Electronically signed by:  Edu Pichardo MD  9/21/2022 9:50 AM CDT Workstation: [id number]    NM PET/CT Skull Base to Mid Thigh    Result Date: 6/22/2022  1.  The right breast is larger than left and there is skin thickening probably sequela of recent therapy.  2. Nuclear medicine PET/CT examination is otherwise unremarkable. No findings diagnostic for metastatic disease.  Electronically signed by:  Juanjo Lara MD  6/22/2022 1:13 PM CDT Workstation: [id number]        Pathology:  [facility] - 05/03/2022 8:52 AM CDT    ** THIS IS AN ADDENDUM REPORT **  CASE: [id number]  PATIENT: LORRAINE MUÑOZ      CLINICAL INFORMATION:    OPERATION:  Right chest lymph node biopsy.    (Clinical Information and Operation obtained from the Requisition submitted  with specimen)    DIAGNOSIS:    Right chest lymph node core biopsy:  - Poorly differentiated carcinoma.    COMMENT:  The core biopsy sections show pleomorphic malignant tumor cells  infiltrate in a background of desmoplastic stroma.  The tumor cells have  pleomorphic nuclei, prominent nucleoli and small to moderate amount of  cytoplasm.  The immunohistochemical stains show the tumor cells are  positive for pancytokeratin, GATA3, mammaglobin, and p63, and negative for  ER, ID, PAX5, CD45, and S100. The tumor cells are negative for CD45 and  PAX5, arguing against lymphoproliferative disorder.    In summary, the immunostains panel is suggestive of metastatic mammary  carcinoma. Clinical correlation is recommended to determine the primary.      GROSS PATHOLOGY:  Right chest lymph node core bx - Received in formalin.  It consists of four light tan to white cylindrical soft tissue cores that  measure 0.4 to 1.8 cm in length and up to 0.1 cm in diameter.  Entirely  submitted in cassettes A1-A3.   MD/mb    MICROSCOPIC PATHOLOGY:  The microscopic slides are reviewed and support the  diagnosis.  JOSELITO/mb        Final Diagnosis performed by AMRITA SANCHEZ MD.  Electronically signed 2/28/2022  9:00:14 AM  [address] [place] [address] [place] [id number] [phone]    ADDENDUM: Per clinician request, [id number] immunostain performed.  [id number] immunostain result: 1+ (negative). Controls stain appropriately.    [id number]    Addendum #1 performed by Jesus Casiano M.D.  Electronically signed 3/8/2022  12:45:54 PM  [address] [place] [address] [place] [address] [phone]    Addendum:  The patient's specimen is  insufficient for Dako PD-L1 IHC 22C3 testing and  jellyfish CDx analysis. Re-biopsy/excisional biopsy with sufficient  tumor volume is suggested if clinically indicated.        Addendum #2 performed by AMRITA SANCHEZ MD.  Electronically signed 5/3/2022  8:52:08 AM  [address] [id number]  [phone]           ·   [facility] - 03/25/2022 8:26 AM CDT  CASE: [id number]  PATIENT: LORRAINE MUÑOZ    CLINICAL INFORMATION:      Right breast mass 12:00 4 cm FN location 0.7 cm  in size birads 5.    OPERATION:  Ultrasound Guided Biopsy 14g - Breast tissue right.    (Clinical Information and Operation obtained from the Requisition submitted  with specimen)    DIAGNOSIS:  Right breast mass, 12:00, 4 cm from nipple, ultrasound-guided biopsy:  - Very small foci of poorly differentiated carcinoma within angiolymphatic  spaces, multifocal (consistent with lymphovascular space invasion).  - Majority of the biopsy is composed of fibroepithelial lesion consistent  with fibroadenoma.    Comment:  A primary lesion is not identified in this material, and the  tumor present appears to all be via lymphovascular space invasion.  The  majority of the specimen has features of fibroepithelial lesion and  adenosis.  The tumor type could be breast carcinoma, given the LAURA-3  staining seen on the recent right chest lymph node biopsy, however the p63  staining is noted in this biopsy, and other primary sites, in particular  urothelial carcinoma, should be excluded clinically.  I the absence of any  urothelial lesion, findings would support breast primary, possibly in area  adjacent to the lesion.  Additional sampling of the breast may be helpful  if there is clinical and radiologic suspicion of a breast primary.      GROSS PATHOLOGY:     Ultrasound-guided right breast biopsy mass 12:00, 4 cm  from nipple location, Birads 5-  Received in formalin.  It consists of  multiple white to yellow to red-brown fibrofatty soft tissue cores that  measure  1.8 x 0.6 x 0.2 cm in aggregate.  Entirely submitted in A1.  ES:km    MICROSCOPIC PATHOLOGY:    Ischemic time equals one minute.  Formalin  fixation time falls between the recommended 6-72 hours.  Sections of the right breast mass, 12:00, 4 cm from nipple location  show an area of poorly differentiated carcinoma within vascular spaces,  consistent with lymphovascular space invasion, however no definitive  primary tumor identified.  The majority of the specimen is composed on  adenosis and fibroepithelial lesion.  The fibroepithelial lesion is  compatible with a background fibroadenoma.  P63 stain is positive in the  tumor cells.  ER is negative.  GA shows non-specific weak staining in small  portion of the tumor cells, and not adequate to assess for GA status in  this limited material.  Her-2-tequila is negative (1+).  KK: mb    [id number], [id number], 88360 x3      Final Diagnosis performed by Luis Lozoya M.D.  Electronically signed  3/25/2022 8:26:50 AM  [address] [place] [address] [phone]           Labs:   Lab Results   Component Value Date    GLUCOSE 111 (H) 09/21/2022    BUN 10 09/21/2022    CREATININE 0.63 09/21/2022    BCR 15.9 09/21/2022    K 4.0 09/21/2022    CO2 25.0 09/21/2022    CALCIUM 8.9 09/21/2022    ALBUMIN 3.90 09/21/2022    AST 16 09/21/2022    ALT 7 09/21/2022       WBC   Date Value Ref Range Status   09/21/2022 5.19 3.40 - 10.80 10*3/mm3 Final     Hemoglobin   Date Value Ref Range Status   09/21/2022 9.8 (L) 12.0 - 15.9 g/dL Final     Hematocrit   Date Value Ref Range Status   09/21/2022 29.9 (L) 34.0 - 46.6 % Final     Platelets   Date Value Ref Range Status   09/21/2022 139 (L) 140 - 450 10*3/mm3 Final       ASSESSMENT/PLAN:  Ms. Duarte is a 62 y.o. female with metastatic right breast cancer with significant right arm and breast swelling, s/p prior rounds of systemic therapy. Personally reviewed available physician notes, imaging, and pathology. Reviewed NCCN guidelines and discussed treatment  options.    She is in significant distress from her right breast and lymphatic disease. She will benefit from a palliative course of radiation therapy to her PET-avid disease. On review of her PET, her disease is limited to the right neck, axilla, and breast, although it is quite extensive. One option is a simple palliative radiation therapy course to 30 Gy in 10 fx, however given that her disease currently is limited to one general region and she has previously had good response to systemic therapy, she may benefit from higher dosing for more durable response. Therefore, I plan to treat the right breast and regional lymphatics as much as safely achievable. Dosing is guided by RTOG 1005 (accelerated whole breast irradiation with hypofractionation plus concurrent boost); plan for 40 Gy in 15 fx to the right breast and regional lymphatics with SIB to the PET-avid disease to 48 Gy. She met with Dr. Troncoso yesterday, plan for Trodelvy after radiation. Discussed with her the logistics of radiation therapy and CT simulation. Will position her in a position that allows safe delivery of radiation therapy to the affected regions but also keeps her in a comfortable position. Potential toxicity includes fatigue, dermatitis, dysphagia, sore throat, risk of brachial plexopathy, and secondary malignancy. At this time, she is agreeable to radiation therapy. Will plan for CT simulation.    Smoking cessation counseling was not performed today due to her significant distress, and will be performed at a later visit.    Electronically signed by Radha Srivastava MD 09/22/22 10:15 CDT

## 2022-09-22 NOTE — PATIENT INSTRUCTIONS
Review patient education folder information:    [facility] Radiation Therapy for Cancer booklet  Radiation Simulation   Radiation Skin Care

## 2022-09-22 NOTE — NURSING NOTE
Patient give verbal consent for breast exam in office with Dr. Srivastava - chaperoned by me.  Patient give verbal permission for cousin, Elizabeth, and neighbor, Carolin Paredes, to remain in the exam room during her breast exam.  Joana Valente RN  September 22, 2022  1987

## 2022-09-27 NOTE — PROGRESS NOTES
Subjective     Radha Duarte was seen in follow up for breast cancer.   She is reporting worsening right arm swelling and right arm and chest wall pain.  Due to cancer progression.  She is cannot receive radiation treatment per radiation oncology.  Pain medicine regimen discussed at length.  Once radiation completed we will start her on Trodelvy.      Past Medical History, Past Surgical History, Social History, Family History have been reviewed and are without significant changes except as mentioned.        Medications:  The current medication list was reviewed in the EMR    ALLERGIES:    Allergies   Allergen Reactions   • Percocet [Oxycodone-Acetaminophen] Nausea Only       Objective      Vitals:    09/28/22 0851   BP: 104/55   Pulse: 92   Resp: 18   Temp: 98.1 °F (36.7 °C)   SpO2: 96%         Current Status 6/7/2022   ECOG score 0       Physical Exam  Vitals and nursing note reviewed.   Constitutional:       Appearance: Normal appearance.   Abdominal:      General: There is no distension.      Palpations: Abdomen is soft. There is no mass.      Tenderness: There is no abdominal tenderness.   Musculoskeletal:      Comments: Right upper extremity swelling, large right breast mass with subcutaneous nodules likely metastasized.   Skin:     Comments: Redness with some skin desquamation involving bilateral palms   Neurological:      Mental Status: She is alert.               RECENT LABS:Independently reviewed and summarized  Hematology WBC   Date Value Ref Range Status   09/21/2022 5.19 3.40 - 10.80 10*3/mm3 Final     RBC   Date Value Ref Range Status   09/21/2022 2.77 (L) 3.77 - 5.28 10*6/mm3 Final     Hemoglobin   Date Value Ref Range Status   09/21/2022 9.8 (L) 12.0 - 15.9 g/dL Final     Hematocrit   Date Value Ref Range Status   09/21/2022 29.9 (L) 34.0 - 46.6 % Final     Platelets   Date Value Ref Range Status   09/21/2022 139 (L) 140 - 450 10*3/mm3 Final       Lab Results   Component Value Date     GLUCOSE 111 (H) 09/21/2022    BUN 10 09/21/2022    CREATININE 0.63 09/21/2022    BCR 15.9 09/21/2022    K 4.0 09/21/2022    CO2 25.0 09/21/2022    CALCIUM 8.9 09/21/2022    ALBUMIN 3.90 09/21/2022    AST 16 09/21/2022    ALT 7 09/21/2022          Radha Duarte reports a pain score of 2.  Given her pain assessment as noted, treatment options were discussed and the following options were decided upon as a follow-up plan to address the patient's pain: prescription for opiod analgesics.    Patient screened negative for depression based on a PHQ-9 score of 1 on 9/28/2022.       Diagnosis:    (1) Metastatic breast cancer with distant LN (mediastinal) metastases   Triple negative   Unable to do perform foundation one due to limited tissue      Current therapy:   Weekly carboplatin/paclitaxel     (4/22/22- 7/7/22)      Discontinue to pain peripheral neuropathy in lower extremities.      Switch to XELODA (7//22/22)      PET scan on 9/19/22 showed progressive disease, mainly in right breast/chest wall/regional nodes.     Palliative RT per radiation oncology.   Plan to switch to trodelvy after RT.       (2) Cancer associated pain   (3) Right upper extremity DVT   (4) Mucositis   (5) Unintentional weight loss   (6) Chemotherapy induce peripheral neuropathy     Assessment & Plan     (1) Metastatic breast cancer with distant LN (mediastinal) metastases     Chronic issue with exacerbation.   Case discussed with Dr Srivastava.   Proceed with palliative RT to right breast/chest wall.   Once completed I will start her on trodelvy.   I will plan to see her back in 3 weeks.   CBC, CMP in 3 weeks  Will discontinue Xeloda.    (2) Cancer associated pain     Chronic issue with exacerbation.  Dose of morphine was increased to 30 mg twice a day.  New prescription sent to the pharmacy.    (3) Right upper extremity DVT     Chronic, stable.   She is on eliquis.   Continue this.   No new concern for bleeding or thrombosis.     (4) Mucositis      Chronic, stable.  PRN magic mouthwash.     (5) Unintentional weight loss     Chronic, stable.   Continue high calorie high protein diet.     (6) Chemotherapy induce peripheral neuropathy     Chronic,stable.   She is on neurontin.   Dose of the Neurontin was increased to 400 mg 3 times daily.  New prescription was sent.      9/27/2022      CC:

## 2022-09-28 ENCOUNTER — OFFICE VISIT (OUTPATIENT)
Dept: ONCOLOGY | Facility: CLINIC | Age: 62
End: 2022-09-28

## 2022-09-28 VITALS
HEART RATE: 92 BPM | SYSTOLIC BLOOD PRESSURE: 104 MMHG | BODY MASS INDEX: 22.78 KG/M2 | RESPIRATION RATE: 18 BRPM | OXYGEN SATURATION: 96 % | TEMPERATURE: 98.1 F | WEIGHT: 128.6 LBS | DIASTOLIC BLOOD PRESSURE: 55 MMHG

## 2022-09-28 DIAGNOSIS — G62.0 CHEMOTHERAPY-INDUCED PERIPHERAL NEUROPATHY: ICD-10-CM

## 2022-09-28 DIAGNOSIS — T45.1X5A CHEMOTHERAPY-INDUCED PERIPHERAL NEUROPATHY: ICD-10-CM

## 2022-09-28 DIAGNOSIS — I82.A12 ACUTE DEEP VEIN THROMBOSIS (DVT) OF AXILLARY VEIN OF LEFT UPPER EXTREMITY: ICD-10-CM

## 2022-09-28 DIAGNOSIS — C50.919 MALIGNANT NEOPLASM OF FEMALE BREAST, UNSPECIFIED ESTROGEN RECEPTOR STATUS, UNSPECIFIED LATERALITY, UNSPECIFIED SITE OF BREAST: Primary | ICD-10-CM

## 2022-09-28 DIAGNOSIS — G89.3 CANCER ASSOCIATED PAIN: ICD-10-CM

## 2022-09-28 PROCEDURE — G0463 HOSPITAL OUTPT CLINIC VISIT: HCPCS | Performed by: INTERNAL MEDICINE

## 2022-09-28 PROCEDURE — 99214 OFFICE O/P EST MOD 30 MIN: CPT | Performed by: INTERNAL MEDICINE

## 2022-09-28 RX ORDER — APIXABAN 5 MG/1
TABLET, FILM COATED ORAL
Qty: 60 TABLET | Refills: 11 | Status: SHIPPED | OUTPATIENT
Start: 2022-09-28

## 2022-09-28 NOTE — TELEPHONE ENCOUNTER
Rx Refill Note  Requested Prescriptions     Pending Prescriptions Disp Refills   • Eliquis 5 MG tablet tablet [Pharmacy Med Name: ELIQUIS TABS 5MG] 60 tablet 11     Sig: TAKE 1 TABLET EVERY 12 HOURS      Last office visit with prescribing clinician: 9/21/2022      Next office visit with prescribing clinician: 9/28/2022            Migdalia Arnold  09/28/22, 09:54 CDT

## 2022-09-29 ENCOUNTER — HOSPITAL ENCOUNTER (OUTPATIENT)
Dept: RADIATION ONCOLOGY | Facility: HOSPITAL | Age: 62
Discharge: HOME | End: 2022-09-29

## 2022-09-29 DIAGNOSIS — C50.919 MALIGNANT NEOPLASM OF FEMALE BREAST, UNSPECIFIED ESTROGEN RECEPTOR STATUS, UNSPECIFIED LATERALITY, UNSPECIFIED SITE OF BREAST: Primary | ICD-10-CM

## 2022-09-29 NOTE — NURSING NOTE
Patient present to review side effects, risk/benefits, questions/answers, and sign written radiation consent with Dr. Srivastava - witnessed by me.  Joana Valente RN  September 29, 2022  08:41 CDT

## 2022-10-03 ENCOUNTER — OFFICE VISIT (OUTPATIENT)
Dept: ONCOLOGY | Facility: CLINIC | Age: 62
End: 2022-10-03

## 2022-10-03 ENCOUNTER — HOSPITAL ENCOUNTER (OUTPATIENT)
Dept: RADIATION ONCOLOGY | Facility: HOSPITAL | Age: 62
Setting detail: RADIATION/ONCOLOGY SERIES
End: 2022-10-03

## 2022-10-03 ENCOUNTER — TELEPHONE (OUTPATIENT)
Dept: ONCOLOGY | Facility: HOSPITAL | Age: 62
End: 2022-10-03

## 2022-10-03 VITALS
DIASTOLIC BLOOD PRESSURE: 62 MMHG | HEART RATE: 83 BPM | OXYGEN SATURATION: 94 % | WEIGHT: 128 LBS | TEMPERATURE: 97.2 F | BODY MASS INDEX: 22.67 KG/M2 | SYSTOLIC BLOOD PRESSURE: 119 MMHG | RESPIRATION RATE: 18 BRPM

## 2022-10-03 DIAGNOSIS — C50.919 MALIGNANT NEOPLASM OF FEMALE BREAST, UNSPECIFIED ESTROGEN RECEPTOR STATUS, UNSPECIFIED LATERALITY, UNSPECIFIED SITE OF BREAST: Primary | ICD-10-CM

## 2022-10-03 PROCEDURE — 99213 OFFICE O/P EST LOW 20 MIN: CPT | Performed by: NURSE PRACTITIONER

## 2022-10-03 PROCEDURE — G0463 HOSPITAL OUTPT CLINIC VISIT: HCPCS | Performed by: NURSE PRACTITIONER

## 2022-10-03 PROCEDURE — 77370 RADIATION PHYSICS CONSULT: CPT | Performed by: STUDENT IN AN ORGANIZED HEALTH CARE EDUCATION/TRAINING PROGRAM

## 2022-10-03 RX ORDER — GABAPENTIN 300 MG/1
CAPSULE ORAL
COMMUNITY
Start: 2022-09-21 | End: 2022-10-11

## 2022-10-03 NOTE — TELEPHONE ENCOUNTER
Contacted pt regarding starting chemo this week. PT given appt for chemo teach today. Advised further appts will be given at chemo teach. PT  verbalizes understanding and denies any further questions.

## 2022-10-03 NOTE — TELEPHONE ENCOUNTER
----- Message from Trisha Meade MD sent at 10/3/2022  9:12 AM CDT -----  Fine by me   ----- Message -----  From: Nasrin Zuniga RN  Sent: 10/3/2022   9:12 AM CDT  To: Trisha Meade MD    She is auth'd and ray can see her today for chemo teach. There is room to get her started tomorrow if the pharmacy has it.    ----- Message -----  From: Trisha Meade MD  Sent: 10/3/2022   8:07 AM CDT  To: DONOVAN Velazquez   ----- Message -----  From: Nasrin Zuniga RN  Sent: 10/3/2022   8:06 AM CDT  To: Trisha Meade MD    Same chemo?    ----- Message -----  From: Trisha Meade MD  Sent: 10/2/2022   8:09 AM CDT  To: Nasrin Zuniga RN    Add her for chemo next week. MD Tg prior.   ----- Message -----  From: Nasrin Zuniga RN  Sent: 9/30/2022   3:41 PM CDT  To: Trisha Meade MD    Daughter called and states that pt was told yesterday at [facility] that it would be 2 weeks before she started. Daughter states pt's pain is much worse. Questioning if she should wait this long before starting new chemo.

## 2022-10-03 NOTE — PROGRESS NOTES
10/3/2022    CHEMOTHERAPY PREPARATION    Radha Duarte  [id number]  1960    Chief Complaint: chemo education     History of present illness:  Radha Duarte is a 62 y.o. year old female who is here today for chemotherapy preparation and needs assessment. The patient has been diagnosed with progressive breast cancer and is scheduled to begin treatment with Sacituzumab.     Oncology History:    Oncology/Hematology History   Malignant neoplasm of female breast (HCC)   5/3/2022 Initial Diagnosis    Malignant neoplasm of female breast (HCC)     5/15/2022 Cancer Staged    Staging form: Breast, AJCC 8th Edition  - Clinical stage from 5/15/2022: Stage IV (cT3, cN2a, pM1, G3, ER-, IA-, HER2-) - Signed by Trisha Meade MD on 5/15/2022     5/16/2022 - 6/7/2022 Chemotherapy    OP BREAST  PACLitaxel / CARBOplatin AUC=1.5 (Weekly)     5/24/2022 -  Chemotherapy    OP CENTRAL VENOUS ACCESS DEVICE ACCESS, CARE, AND MAINTENANCE (CVAD)     7/7/2022 - 7/7/2022 Chemotherapy    OP BREAST Capecitabine     8/19/2022 Biopsy    OP BREAST Sacituzumab govitecan-hziy   Plan Provider: Trisha Meade MD  Treatment goal: Palliative  Line of treatment: Third Line     Malignant neoplasm of female breast, unspecified estrogen receptor status, unspecified laterality, unspecified site of breast (HCC)   9/22/2022 Initial Diagnosis    Malignant neoplasm of female breast, unspecified estrogen receptor status, unspecified laterality, unspecified site of breast (HCC)     9/22/2022 -  Radiation    RADIATION THERAPY Treatment Details (Noted on 9/22/2022)  Site: Right Breast  Technique: IMRT  Goal: No goal specified  Planned Treatment Start Date: No planned start date specified         Past Medical History:   Diagnosis Date   • Acute deep vein thrombosis (DVT) of brachial vein of right upper extremity (HCC) 03/2022   • Anemia    • Breast cancer (HCC) 02/22/2022   • Encounter for antineoplastic chemotherapy     2 cycles  Taxol/Carbo before   • Hyperlipidemia    • Osteoporosis     neck, lower back   • Varicose veins of right lower extremity     hx blood clot in right lower calf - age 21       Past Surgical History:   Procedure Laterality Date   • BREAST BIOPSY Right 02/22/2022   • COLONOSCOPY N/A 03/09/2020    Procedure: COLONOSCOPY;  Surgeon: Bishop Rodriguez DO;  Location: [facility] ENDOSCOPY;  Service: Gastroenterology;  Laterality: N/A;   • MEDIPORT INSERTION, SINGLE Left 05/17/2022   • SHOULDER MANIPULATION Bilateral 2003    frozen shoulder   • VARICOSE VEIN SURGERY  1986       MEDICATIONS: The current medication list was reviewed and reconciled.     Allergies:  is allergic to percocet [oxycodone-acetaminophen].    Family History   Problem Relation Age of Onset   • ALS Mother    • Ulcers Mother    • Heart disease Father    • Alcohol abuse Father    • Cancer Brother    • Stroke Brother    • COPD Brother    • Anuerysm Brother    • Heart disease Paternal Grandmother    • Diabetes Paternal Grandmother    • Stroke Paternal Aunt          Review of Systems    Physical Exam  Vital Signs: There were no vitals taken for this visit.   General Appearance:  alert, cooperative, no apparent distress, appears stated age and normal weight   Neurologic/Psychiatric: A&O x 3, gait steady, appropriate affect   HEENT:  Normocephalic, without obvious abnormality, mucous membranes moist   Lungs:   Clear to auscultation bilaterally; respirations regular, even, and unlabored bilaterally   Heart:  Regular rate and rhythm, no murmurs appreciated   Extremities: Normal, atraumatic; no clubbing, cyanosis, or edema    Skin: No rashes, lesions, or abnormal coloration noted     ECOG Performance Status: (1) Restricted in Physically Strenuous Activity, Ambulatory & Able to Do Work of Light Nature          NEEDS ASSESSMENTS    Genetics  The patient's new diagnosis and family history have been reviewed for genetic counseling needs. A genetic referral is not  recommended.     Psychosocial  [facility] and has seen Aimee Addison LCSW in past and is well aware of her services.  Copies of patient's questionnaires will be scanned into EMR for details and further reference.    VAD Assessment  The patient has medi port in place.    Advanced Care Planning  The patient has asked to see Aimee Addison LCSW for assistance in filling out living will.    Palliative Care  The patient and I discussed palliative care services. Palliative care is not the same as Hospice care. This is specialized medical care for people living with serious illness with the goal of improving quality of life for the patient and their family. [facility] has partnered with [facility] to offer our patients outpatient palliative care early along with their treatment to assist in coordination of care, symptom management, pain management, and medical decision making.  Oncology criteria for palliative care referral is met at this time. The patient is not interested in a palliative care consultation.     Additional Referral needs  none      CHEMOTHERAPY EDUCATION    Booklets Given: Chemo cares education sheet on Sacituzumab; new consent form signed today.     Chemotherapy/Biotherapy Education Sheets: (list all that apply)  nausea management, acid reflux management, diarrhea management, Cancer resourse contacts information, skin and mouth care and vaccination information                                                                                                                                                                 Chemotherapy Regimen:   Treatment Plans     Name Type Plan Dates Plan Provider         Active    OP BREAST Sacituzumab govitecan-hziy  ONCOLOGY TREATMENT 2  8/18/2022 - Present Trisha Meade MD                    TOPICS EDUCATION PROVIDED COMMENTS   ANEMIA:  role of RBC, cause, s/s, ways to manage, role of transfusion [x]     THROMBOCYTOPENIA:  role of platelet, cause, s/s, ways to prevent bleeding, things to avoid, when to seek help [x]    NEUTROPENIA:  role of WBC, cause, infection precautions, s/s of infection, when to call MD [x]    NUTRITION & APPETITE CHANGES:  importance of maintaining healthy diet & weight, ways to manage to improve intake, dietary consult, exercise regimen [x]    DIARRHEA:  causes, s/s of dehydration, ways to manage, dietary changes, when to call MD [x]    CONSTIPATION:  causes, ways to manage, dietary changes, when to call MD [x]    NAUSEA & VOMITING:  cause, use of antiemetics, dietary changes, when to call MD [x]    MOUTH SORES:  causes, oral care, ways to manage [x]    ALOPECIA:  cause, ways to manage, resources [x]    INFERTILITY & SEXUALITY:  causes, fertility preservation options, sexuality changes, ways to manage, importance of birth control [x]    NERVOUS SYSTEM CHANGES:  causes, s/s, neuropathies, cognitive changes, ways to manage [x]    PAIN:  causes, ways to manage [x] ????   SKIN & NAIL CHANGES:  cause, s/s, ways to manage [x]    ORGAN TOXICITIES:  cause, s/s, need for diagnostic tests, labs, when to notify MD [x]    SURVIVORSHIP:  distress, distress assessment, secondary malignancies, early/late effects, follow-up, social issues, social support [x]    HOME CARE:  use of spill kits, storing of PO chemo, how to manage bodily fluids [x]    MISCELLANEOUS:  drug interactions, administration, vesicant, et [x]        Assessment and Plan:    Diagnoses and all orders for this visit:    1. Malignant neoplasm of female breast, unspecified estrogen receptor status, unspecified laterality, unspecified site of breast (HCC) (Primary)        This was a 20 minute face-to-face visit with 20 minutes spent in  counseling and coordination of care as documented above.   The patient and I have reviewed their new cancer diagnosis and scheduled treatment plan. Needs assessment was completed including genetics,  psychosocial needs, barriers to care, VAD evaluation, advanced care planning, and palliative care services. Referrals have been ordered as appropriate based upon our evaluation and patient desires.     Chemotherapy teaching was also completed today as documented above. Adequate time was given to answer all questions to her satisfaction. Patient and family are aware of their care team members and contact information if they have questions or problems throughout the treatment course. Needs assessments and education has been completed. The patient is adequately prepared to begin treatment as scheduled.       Estefanía Brady, APRN

## 2022-10-03 NOTE — PROGRESS NOTES
Subjective     Radha Duarte was seen in follow up for metastatic breast cancer.    She has been stable since last visit.  She is reporting worsening right upper extremity swelling and pain likely from worsening metastatic disease.  Might take couple of weeks before she can start radiation.  Meanwhile given worsening symptoms we decided to start her on systemic chemotherapy.  Once she start radiation treatment we will hold her systemic therapy.  Pain regimen discussed at length.    Past Medical History, Past Surgical History, Social History, Family History have been reviewed and are without significant changes except as mentioned.        Medications:  The current medication list was reviewed in the EMR    ALLERGIES:    Allergies   Allergen Reactions   • Percocet [Oxycodone-Acetaminophen] Nausea Only       Objective      Vitals:    10/04/22 0759   BP: 144/60   Pulse: 84   Resp: 18   Temp: 98.4 °F (36.9 °C)   SpO2: 93%         Current Status 6/7/2022   ECOG score 0       Physical Exam  Vitals and nursing note reviewed.   Constitutional:       Appearance: Normal appearance.   Musculoskeletal:      Comments: Right upper extremity swelling and tenderness   Neurological:      General: No focal deficit present.      Mental Status: She is alert and oriented to person, place, and time. Mental status is at baseline.   Psychiatric:         Mood and Affect: Mood normal.         Behavior: Behavior normal.         Thought Content: Thought content normal.               RECENT LABS:Independently reviewed and summarized  Hematology WBC   Date Value Ref Range Status   09/21/2022 5.19 3.40 - 10.80 10*3/mm3 Final     RBC   Date Value Ref Range Status   09/21/2022 2.77 (L) 3.77 - 5.28 10*6/mm3 Final     Hemoglobin   Date Value Ref Range Status   09/21/2022 9.8 (L) 12.0 - 15.9 g/dL Final     Hematocrit   Date Value Ref Range Status   09/21/2022 29.9 (L) 34.0 - 46.6 % Final     Platelets   Date Value Ref Range Status    09/21/2022 139 (L) 140 - 450 10*3/mm3 Final     WBC   Date Value Ref Range Status   10/04/2022 4.16 3.40 - 10.80 10*3/mm3 Final     RBC   Date Value Ref Range Status   10/04/2022 3.15 (L) 3.77 - 5.28 10*6/mm3 Final     Hemoglobin   Date Value Ref Range Status   10/04/2022 10.5 (L) 12.0 - 15.9 g/dL Final     Hematocrit   Date Value Ref Range Status   10/04/2022 33.3 (L) 34.0 - 46.6 % Final     MCV   Date Value Ref Range Status   10/04/2022 105.7 (H) 79.0 - 97.0 fL Final     MCH   Date Value Ref Range Status   10/04/2022 33.3 (H) 26.6 - 33.0 pg Final     MCHC   Date Value Ref Range Status   10/04/2022 31.5 31.5 - 35.7 g/dL Final     RDW   Date Value Ref Range Status   10/04/2022 17.1 (H) 12.3 - 15.4 % Final     RDW-SD   Date Value Ref Range Status   10/04/2022 67.2 (H) 37.0 - 54.0 fl Final     MPV   Date Value Ref Range Status   10/04/2022 9.5 6.0 - 12.0 fL Final     Platelets   Date Value Ref Range Status   10/04/2022 134 (L) 140 - 450 10*3/mm3 Final     Neutrophil %   Date Value Ref Range Status   10/04/2022 59.1 42.7 - 76.0 % Final     Lymphocyte %   Date Value Ref Range Status   10/04/2022 26.9 19.6 - 45.3 % Final     Monocyte %   Date Value Ref Range Status   10/04/2022 9.9 5.0 - 12.0 % Final     Eosinophil %   Date Value Ref Range Status   10/04/2022 2.9 0.3 - 6.2 % Final     Basophil %   Date Value Ref Range Status   10/04/2022 0.7 0.0 - 1.5 % Final     Immature Grans %   Date Value Ref Range Status   10/04/2022 0.5 0.0 - 0.5 % Final     Neutrophils, Absolute   Date Value Ref Range Status   10/04/2022 2.46 1.70 - 7.00 10*3/mm3 Final     Lymphocytes, Absolute   Date Value Ref Range Status   10/04/2022 1.12 0.70 - 3.10 10*3/mm3 Final     Monocytes, Absolute   Date Value Ref Range Status   10/04/2022 0.41 0.10 - 0.90 10*3/mm3 Final     Eosinophils, Absolute   Date Value Ref Range Status   10/04/2022 0.12 0.00 - 0.40 10*3/mm3 Final     Basophils, Absolute   Date Value Ref Range Status   10/04/2022 0.03 0.00 -  0.20 10*3/mm3 Final     Immature Grans, Absolute   Date Value Ref Range Status   10/04/2022 0.02 0.00 - 0.05 10*3/mm3 Final     nRBC   Date Value Ref Range Status   10/04/2022 0.0 0.0 - 0.2 /100 WBC Final       Lab Results   Component Value Date    GLUCOSE 111 (H) 10/04/2022    BUN 12 10/04/2022    CREATININE 0.68 10/04/2022    BCR 17.6 10/04/2022    K 3.8 10/04/2022    CO2 26.0 10/04/2022    CALCIUM 9.0 10/04/2022    ALBUMIN 3.90 10/04/2022    AST 22 10/04/2022    ALT 9 10/04/2022                Radha Duarte reports a pain score of 0.  Given her pain assessment as noted, treatment options were discussed and the following options were decided upon as a follow-up plan to address the patient's pain: continuation of current treatment plan for pain.    Patient screened negative for depression based on a PHQ-9 score of 0 on 10/4/2022.    Diagnosis:    (1) Metastatic breast cancer with distant LN (mediastinal) metastases   Triple negative   Unable to do perform foundation one due to limited tissue      Current therapy:   Weekly carboplatin/paclitaxel     (4/22/22- 7/7/22)      Discontinue to pain peripheral neuropathy in lower extremities.      Switch to XELODA (7//22/22)      PET scan on 9/19/22 showed progressive disease, mainly in right breast/chest wall/regional nodes.      Palliative RT per radiation oncology.   Given she is symptomatic we decided to start her on Trodelvy while awaiting radiation treatment.       (2) Cancer associated pain   (3) Right upper extremity DVT   (4) Mucositis   (5) Unintentional weight loss   (6) Chemotherapy induce peripheral neuropathy   (7) Mucositis     Assessment & Plan       (1) Metastatic breast cancer with distant LN (mediastinal) metastases     Chronic issue with exacerbation   Was treated with carboplatin/paclitaxel ( discontinue to neuropathy), xeloda (progressive disease).   Plan for RT to right breast/chest wall/regional LN.   This might take a couple of weeks to set  up.   Meanwhile, we discussed systemic therapy with trodelvy.   Risks versus benefits discussed.   Risk of allergic reaction, chemotherapy-induced nausea emesis, chemotherapy-induced diarrhea, neutropenia, risk of infection discussed at length.  Labs look stable.  Day 1 cycle 1 Dayanara was signed on today's visit.  I will plan to see her back in 1 week with CBC, CMP prior to day 8 cycle 1    (2) Cancer associated pain     Chronic, stable.   She is on MS Contin 30 mg bid.   New prescription of MS Contin was sent to the pharmacy.    (3) Right upper extremity DVT     Chronic, stable.   She is on eliquis.   Continue this.   No new concern for bleeding or thrombosis.     (4) Mucositis     Chronic, stable.  PRN magic mouthwash.     (5) Unintentional weight loss     Chronic, stable.   Continue high calorie high protein diet.     (6) Chemotherapy induce peripheral neuropathy     Chronic, stable.   She is on neurontin 400 mg TID.   New prescription of Neurontin was sent to the pharmacy.    (7) Mucositis     Chronic, stable.  New prescription of Magic mouthwash was sent to the pharmacy.      10/3/2022      CC:

## 2022-10-03 NOTE — TELEPHONE ENCOUNTER
PT informed of appt this afternoon for chemo teach. Pharmacy confirmed medication availability. Further appts will be given to pt at chemo teach this afternoon. PT verbalizes understanding.

## 2022-10-04 ENCOUNTER — OFFICE VISIT (OUTPATIENT)
Dept: ONCOLOGY | Facility: CLINIC | Age: 62
End: 2022-10-04

## 2022-10-04 ENCOUNTER — INFUSION (OUTPATIENT)
Dept: ONCOLOGY | Facility: HOSPITAL | Age: 62
End: 2022-10-04

## 2022-10-04 VITALS
RESPIRATION RATE: 18 BRPM | SYSTOLIC BLOOD PRESSURE: 144 MMHG | DIASTOLIC BLOOD PRESSURE: 60 MMHG | WEIGHT: 130 LBS | TEMPERATURE: 98.4 F | OXYGEN SATURATION: 93 % | HEART RATE: 84 BPM | BODY MASS INDEX: 23.03 KG/M2

## 2022-10-04 DIAGNOSIS — C50.919 MALIGNANT NEOPLASM OF FEMALE BREAST, UNSPECIFIED ESTROGEN RECEPTOR STATUS, UNSPECIFIED LATERALITY, UNSPECIFIED SITE OF BREAST: Primary | ICD-10-CM

## 2022-10-04 DIAGNOSIS — G89.3 CANCER ASSOCIATED PAIN: ICD-10-CM

## 2022-10-04 DIAGNOSIS — G62.0 CHEMOTHERAPY-INDUCED PERIPHERAL NEUROPATHY: ICD-10-CM

## 2022-10-04 DIAGNOSIS — T45.1X5A CHEMOTHERAPY-INDUCED PERIPHERAL NEUROPATHY: ICD-10-CM

## 2022-10-04 DIAGNOSIS — I82.A12 ACUTE DEEP VEIN THROMBOSIS (DVT) OF AXILLARY VEIN OF LEFT UPPER EXTREMITY: ICD-10-CM

## 2022-10-04 LAB
ALBUMIN SERPL-MCNC: 3.9 G/DL (ref 3.5–5.2)
ALBUMIN/GLOB SERPL: 1.8 G/DL
ALP SERPL-CCNC: 80 U/L (ref 39–117)
ALT SERPL W P-5'-P-CCNC: 9 U/L (ref 1–33)
ANION GAP SERPL CALCULATED.3IONS-SCNC: 9 MMOL/L (ref 5–15)
AST SERPL-CCNC: 22 U/L (ref 1–32)
BASOPHILS # BLD AUTO: 0.03 10*3/MM3 (ref 0–0.2)
BASOPHILS NFR BLD AUTO: 0.7 % (ref 0–1.5)
BILIRUB SERPL-MCNC: 0.4 MG/DL (ref 0–1.2)
BUN SERPL-MCNC: 12 MG/DL (ref 8–23)
BUN/CREAT SERPL: 17.6 (ref 7–25)
CALCIUM SPEC-SCNC: 9 MG/DL (ref 8.6–10.5)
CHLORIDE SERPL-SCNC: 106 MMOL/L (ref 98–107)
CO2 SERPL-SCNC: 26 MMOL/L (ref 22–29)
CREAT SERPL-MCNC: 0.68 MG/DL (ref 0.57–1)
DEPRECATED RDW RBC AUTO: 67.2 FL (ref 37–54)
EGFRCR SERPLBLD CKD-EPI 2021: 97.4 ML/MIN/1.73
EOSINOPHIL # BLD AUTO: 0.12 10*3/MM3 (ref 0–0.4)
EOSINOPHIL NFR BLD AUTO: 2.9 % (ref 0.3–6.2)
ERYTHROCYTE [DISTWIDTH] IN BLOOD BY AUTOMATED COUNT: 17.1 % (ref 12.3–15.4)
GLOBULIN UR ELPH-MCNC: 2.2 GM/DL
GLUCOSE SERPL-MCNC: 111 MG/DL (ref 65–99)
HCT VFR BLD AUTO: 33.3 % (ref 34–46.6)
HGB BLD-MCNC: 10.5 G/DL (ref 12–15.9)
HOLD SPECIMEN: NORMAL
IMM GRANULOCYTES # BLD AUTO: 0.02 10*3/MM3 (ref 0–0.05)
IMM GRANULOCYTES NFR BLD AUTO: 0.5 % (ref 0–0.5)
LYMPHOCYTES # BLD AUTO: 1.12 10*3/MM3 (ref 0.7–3.1)
LYMPHOCYTES NFR BLD AUTO: 26.9 % (ref 19.6–45.3)
MCH RBC QN AUTO: 33.3 PG (ref 26.6–33)
MCHC RBC AUTO-ENTMCNC: 31.5 G/DL (ref 31.5–35.7)
MCV RBC AUTO: 105.7 FL (ref 79–97)
MONOCYTES # BLD AUTO: 0.41 10*3/MM3 (ref 0.1–0.9)
MONOCYTES NFR BLD AUTO: 9.9 % (ref 5–12)
NEUTROPHILS NFR BLD AUTO: 2.46 10*3/MM3 (ref 1.7–7)
NEUTROPHILS NFR BLD AUTO: 59.1 % (ref 42.7–76)
NRBC BLD AUTO-RTO: 0 /100 WBC (ref 0–0.2)
PLATELET # BLD AUTO: 134 10*3/MM3 (ref 140–450)
PMV BLD AUTO: 9.5 FL (ref 6–12)
POTASSIUM SERPL-SCNC: 3.8 MMOL/L (ref 3.5–5.2)
PROT SERPL-MCNC: 6.1 G/DL (ref 6–8.5)
RBC # BLD AUTO: 3.15 10*6/MM3 (ref 3.77–5.28)
SODIUM SERPL-SCNC: 141 MMOL/L (ref 136–145)
WBC NRBC COR # BLD: 4.16 10*3/MM3 (ref 3.4–10.8)

## 2022-10-04 PROCEDURE — 25010000002 SACITUZUMAB GOVITECAN-HZIY 180 MG RECONSTITUTED SOLUTION 1 EACH VIAL: Performed by: INTERNAL MEDICINE

## 2022-10-04 PROCEDURE — 25010000002 FOSAPREPITANT PER 1 MG: Performed by: INTERNAL MEDICINE

## 2022-10-04 PROCEDURE — 80053 COMPREHEN METABOLIC PANEL: CPT

## 2022-10-04 PROCEDURE — 85025 COMPLETE CBC W/AUTO DIFF WBC: CPT

## 2022-10-04 PROCEDURE — 25010000002 HEPARIN LOCK FLUSH PER 10 UNITS: Performed by: INTERNAL MEDICINE

## 2022-10-04 PROCEDURE — 25010000002 PALONOSETRON PER 25 MCG: Performed by: INTERNAL MEDICINE

## 2022-10-04 PROCEDURE — 96375 TX/PRO/DX INJ NEW DRUG ADDON: CPT | Performed by: INTERNAL MEDICINE

## 2022-10-04 PROCEDURE — 96413 CHEMO IV INFUSION 1 HR: CPT | Performed by: INTERNAL MEDICINE

## 2022-10-04 PROCEDURE — 36415 COLL VENOUS BLD VENIPUNCTURE: CPT

## 2022-10-04 PROCEDURE — 25010000002 DIPHENHYDRAMINE PER 50 MG: Performed by: INTERNAL MEDICINE

## 2022-10-04 PROCEDURE — 96415 CHEMO IV INFUSION ADDL HR: CPT | Performed by: INTERNAL MEDICINE

## 2022-10-04 PROCEDURE — 99214 OFFICE O/P EST MOD 30 MIN: CPT | Performed by: INTERNAL MEDICINE

## 2022-10-04 PROCEDURE — 25010000002 DEXAMETHASONE SODIUM PHOSPHATE 100 MG/10ML SOLUTION: Performed by: INTERNAL MEDICINE

## 2022-10-04 PROCEDURE — 96367 TX/PROPH/DG ADDL SEQ IV INF: CPT | Performed by: INTERNAL MEDICINE

## 2022-10-04 RX ORDER — SODIUM CHLORIDE 9 MG/ML
250 INJECTION, SOLUTION INTRAVENOUS ONCE
Status: CANCELLED | OUTPATIENT
Start: 2022-10-04

## 2022-10-04 RX ORDER — ACETAMINOPHEN 325 MG/1
650 TABLET ORAL ONCE
Status: CANCELLED | OUTPATIENT
Start: 2022-10-04

## 2022-10-04 RX ORDER — OLANZAPINE 5 MG/1
5 TABLET ORAL ONCE
Status: CANCELLED | OUTPATIENT
Start: 2022-10-04 | End: 2022-10-04

## 2022-10-04 RX ORDER — ATROPINE SULFATE 1 MG/ML
0.25 INJECTION, SOLUTION INTRAMUSCULAR; INTRAVENOUS; SUBCUTANEOUS
Status: DISCONTINUED | OUTPATIENT
Start: 2022-10-04 | End: 2022-10-04 | Stop reason: HOSPADM

## 2022-10-04 RX ORDER — HEPARIN SODIUM (PORCINE) LOCK FLUSH IV SOLN 100 UNIT/ML 100 UNIT/ML
500 SOLUTION INTRAVENOUS AS NEEDED
Status: CANCELLED | OUTPATIENT
Start: 2022-10-11

## 2022-10-04 RX ORDER — HEPARIN SODIUM (PORCINE) LOCK FLUSH IV SOLN 100 UNIT/ML 100 UNIT/ML
500 SOLUTION INTRAVENOUS AS NEEDED
Status: DISCONTINUED | OUTPATIENT
Start: 2022-10-04 | End: 2022-10-04 | Stop reason: HOSPADM

## 2022-10-04 RX ORDER — SODIUM CHLORIDE 0.9 % (FLUSH) 0.9 %
10 SYRINGE (ML) INJECTION AS NEEDED
Status: CANCELLED | OUTPATIENT
Start: 2022-10-04

## 2022-10-04 RX ORDER — SODIUM CHLORIDE 0.9 % (FLUSH) 0.9 %
10 SYRINGE (ML) INJECTION AS NEEDED
Status: DISCONTINUED | OUTPATIENT
Start: 2022-10-04 | End: 2022-10-04 | Stop reason: HOSPADM

## 2022-10-04 RX ORDER — ATROPINE SULFATE 1 MG/ML
0.25 INJECTION, SOLUTION INTRAMUSCULAR; INTRAVENOUS; SUBCUTANEOUS
Status: CANCELLED | OUTPATIENT
Start: 2022-10-04

## 2022-10-04 RX ORDER — FAMOTIDINE 10 MG/ML
20 INJECTION, SOLUTION INTRAVENOUS ONCE
Status: COMPLETED | OUTPATIENT
Start: 2022-10-04 | End: 2022-10-04

## 2022-10-04 RX ORDER — GABAPENTIN 400 MG/1
400 CAPSULE ORAL 3 TIMES DAILY
Qty: 90 CAPSULE | Refills: 0 | Status: SHIPPED | OUTPATIENT
Start: 2022-10-04 | End: 2022-10-19

## 2022-10-04 RX ORDER — MORPHINE SULFATE 15 MG/1
30 TABLET, FILM COATED, EXTENDED RELEASE ORAL 2 TIMES DAILY
Qty: 90 TABLET | Refills: 0 | Status: SHIPPED | OUTPATIENT
Start: 2022-10-04 | End: 2022-10-20 | Stop reason: SDUPTHER

## 2022-10-04 RX ORDER — ONDANSETRON HYDROCHLORIDE 8 MG/1
8 TABLET, FILM COATED ORAL 3 TIMES DAILY PRN
Qty: 30 TABLET | Refills: 5 | Status: SHIPPED | OUTPATIENT
Start: 2022-10-04 | End: 2023-01-04

## 2022-10-04 RX ORDER — OLANZAPINE 5 MG/1
5 TABLET ORAL NIGHTLY
Qty: 6 TABLET | Refills: 5 | Status: SHIPPED | OUTPATIENT
Start: 2022-10-04 | End: 2022-10-28 | Stop reason: SDUPTHER

## 2022-10-04 RX ORDER — DIPHENHYDRAMINE HYDROCHLORIDE 50 MG/ML
50 INJECTION INTRAMUSCULAR; INTRAVENOUS AS NEEDED
Status: CANCELLED | OUTPATIENT
Start: 2022-10-04

## 2022-10-04 RX ORDER — OLANZAPINE 5 MG/1
5 TABLET ORAL ONCE
Status: COMPLETED | OUTPATIENT
Start: 2022-10-04 | End: 2022-10-04

## 2022-10-04 RX ORDER — FAMOTIDINE 10 MG/ML
20 INJECTION, SOLUTION INTRAVENOUS ONCE
Status: CANCELLED | OUTPATIENT
Start: 2022-10-04

## 2022-10-04 RX ORDER — FAMOTIDINE 10 MG/ML
20 INJECTION, SOLUTION INTRAVENOUS AS NEEDED
Status: CANCELLED | OUTPATIENT
Start: 2022-10-04

## 2022-10-04 RX ORDER — PALONOSETRON 0.05 MG/ML
0.25 INJECTION, SOLUTION INTRAVENOUS ONCE
Status: COMPLETED | OUTPATIENT
Start: 2022-10-04 | End: 2022-10-04

## 2022-10-04 RX ORDER — PALONOSETRON 0.05 MG/ML
0.25 INJECTION, SOLUTION INTRAVENOUS ONCE
Status: CANCELLED | OUTPATIENT
Start: 2022-10-04

## 2022-10-04 RX ORDER — SODIUM CHLORIDE 9 MG/ML
250 INJECTION, SOLUTION INTRAVENOUS ONCE
Status: COMPLETED | OUTPATIENT
Start: 2022-10-04 | End: 2022-10-04

## 2022-10-04 RX ORDER — ACETAMINOPHEN 325 MG/1
650 TABLET ORAL ONCE
Status: COMPLETED | OUTPATIENT
Start: 2022-10-04 | End: 2022-10-04

## 2022-10-04 RX ADMIN — FOSAPREPITANT 100 ML: 150 INJECTION, POWDER, LYOPHILIZED, FOR SOLUTION INTRAVENOUS at 08:46

## 2022-10-04 RX ADMIN — SACITUZUMAB GOVITECAN 590 MG: 180 POWDER, FOR SOLUTION INTRAVENOUS at 10:42

## 2022-10-04 RX ADMIN — ACETAMINOPHEN 650 MG: 325 TABLET, FILM COATED ORAL at 08:32

## 2022-10-04 RX ADMIN — DEXAMETHASONE SODIUM PHOSPHATE 12 MG: 10 INJECTION, SOLUTION INTRAMUSCULAR; INTRAVENOUS at 10:06

## 2022-10-04 RX ADMIN — PALONOSETRON HYDROCHLORIDE 0.25 MG: 0.25 INJECTION, SOLUTION INTRAVENOUS at 08:31

## 2022-10-04 RX ADMIN — OLANZAPINE 5 MG: 5 TABLET, FILM COATED ORAL at 08:32

## 2022-10-04 RX ADMIN — Medication 10 ML: at 14:05

## 2022-10-04 RX ADMIN — HEPARIN 500 UNITS: 100 SYRINGE at 14:05

## 2022-10-04 RX ADMIN — DIPHENHYDRAMINE HYDROCHLORIDE 50 MG: 50 INJECTION, SOLUTION INTRAMUSCULAR; INTRAVENOUS at 09:41

## 2022-10-04 RX ADMIN — SODIUM CHLORIDE 250 ML: 9 INJECTION, SOLUTION INTRAVENOUS at 08:30

## 2022-10-04 RX ADMIN — FAMOTIDINE 20 MG: 10 INJECTION, SOLUTION INTRAVENOUS at 10:28

## 2022-10-06 PROCEDURE — 77300 RADIATION THERAPY DOSE PLAN: CPT | Performed by: STUDENT IN AN ORGANIZED HEALTH CARE EDUCATION/TRAINING PROGRAM

## 2022-10-06 PROCEDURE — 77301 RADIOTHERAPY DOSE PLAN IMRT: CPT | Performed by: STUDENT IN AN ORGANIZED HEALTH CARE EDUCATION/TRAINING PROGRAM

## 2022-10-06 PROCEDURE — 77338 DESIGN MLC DEVICE FOR IMRT: CPT | Performed by: STUDENT IN AN ORGANIZED HEALTH CARE EDUCATION/TRAINING PROGRAM

## 2022-10-11 ENCOUNTER — OFFICE VISIT (OUTPATIENT)
Dept: ONCOLOGY | Facility: CLINIC | Age: 62
End: 2022-10-11

## 2022-10-11 ENCOUNTER — INFUSION (OUTPATIENT)
Dept: ONCOLOGY | Facility: HOSPITAL | Age: 62
End: 2022-10-11

## 2022-10-11 VITALS
RESPIRATION RATE: 18 BRPM | WEIGHT: 124.8 LBS | TEMPERATURE: 96.6 F | HEART RATE: 80 BPM | BODY MASS INDEX: 22.11 KG/M2 | SYSTOLIC BLOOD PRESSURE: 143 MMHG | OXYGEN SATURATION: 98 % | DIASTOLIC BLOOD PRESSURE: 53 MMHG

## 2022-10-11 DIAGNOSIS — C50.919 MALIGNANT NEOPLASM OF FEMALE BREAST, UNSPECIFIED ESTROGEN RECEPTOR STATUS, UNSPECIFIED LATERALITY, UNSPECIFIED SITE OF BREAST: Primary | ICD-10-CM

## 2022-10-11 LAB
ALBUMIN SERPL-MCNC: 3.9 G/DL (ref 3.5–5.2)
ALBUMIN/GLOB SERPL: 1.6 G/DL
ALP SERPL-CCNC: 98 U/L (ref 39–117)
ALT SERPL W P-5'-P-CCNC: 10 U/L (ref 1–33)
ANION GAP SERPL CALCULATED.3IONS-SCNC: 9 MMOL/L (ref 5–15)
AST SERPL-CCNC: 22 U/L (ref 1–32)
BASOPHILS # BLD AUTO: 0.03 10*3/MM3 (ref 0–0.2)
BASOPHILS NFR BLD AUTO: 0.7 % (ref 0–1.5)
BILIRUB SERPL-MCNC: 0.3 MG/DL (ref 0–1.2)
BUN SERPL-MCNC: 11 MG/DL (ref 8–23)
BUN/CREAT SERPL: 17.2 (ref 7–25)
CALCIUM SPEC-SCNC: 8.8 MG/DL (ref 8.6–10.5)
CHLORIDE SERPL-SCNC: 107 MMOL/L (ref 98–107)
CO2 SERPL-SCNC: 25 MMOL/L (ref 22–29)
CREAT SERPL-MCNC: 0.64 MG/DL (ref 0.57–1)
DEPRECATED RDW RBC AUTO: 60.9 FL (ref 37–54)
EGFRCR SERPLBLD CKD-EPI 2021: 98.8 ML/MIN/1.73
EOSINOPHIL # BLD AUTO: 0.06 10*3/MM3 (ref 0–0.4)
EOSINOPHIL NFR BLD AUTO: 1.4 % (ref 0.3–6.2)
ERYTHROCYTE [DISTWIDTH] IN BLOOD BY AUTOMATED COUNT: 15.9 % (ref 12.3–15.4)
GLOBULIN UR ELPH-MCNC: 2.4 GM/DL
GLUCOSE SERPL-MCNC: 111 MG/DL (ref 65–99)
HCT VFR BLD AUTO: 28.3 % (ref 34–46.6)
HGB BLD-MCNC: 9.5 G/DL (ref 12–15.9)
HOLD SPECIMEN: NORMAL
IMM GRANULOCYTES # BLD AUTO: 0.05 10*3/MM3 (ref 0–0.05)
IMM GRANULOCYTES NFR BLD AUTO: 1.2 % (ref 0–0.5)
LYMPHOCYTES # BLD AUTO: 0.95 10*3/MM3 (ref 0.7–3.1)
LYMPHOCYTES NFR BLD AUTO: 21.9 % (ref 19.6–45.3)
MCH RBC QN AUTO: 34.8 PG (ref 26.6–33)
MCHC RBC AUTO-ENTMCNC: 33.6 G/DL (ref 31.5–35.7)
MCV RBC AUTO: 103.7 FL (ref 79–97)
MONOCYTES # BLD AUTO: 0.45 10*3/MM3 (ref 0.1–0.9)
MONOCYTES NFR BLD AUTO: 10.4 % (ref 5–12)
NEUTROPHILS NFR BLD AUTO: 2.79 10*3/MM3 (ref 1.7–7)
NEUTROPHILS NFR BLD AUTO: 64.4 % (ref 42.7–76)
NRBC BLD AUTO-RTO: 0 /100 WBC (ref 0–0.2)
PLATELET # BLD AUTO: 150 10*3/MM3 (ref 140–450)
PMV BLD AUTO: 10 FL (ref 6–12)
POTASSIUM SERPL-SCNC: 4.1 MMOL/L (ref 3.5–5.2)
PROT SERPL-MCNC: 6.3 G/DL (ref 6–8.5)
RBC # BLD AUTO: 2.73 10*6/MM3 (ref 3.77–5.28)
SODIUM SERPL-SCNC: 141 MMOL/L (ref 136–145)
WBC NRBC COR # BLD: 4.33 10*3/MM3 (ref 3.4–10.8)

## 2022-10-11 PROCEDURE — 25010000002 HEPARIN LOCK FLUSH PER 10 UNITS: Performed by: INTERNAL MEDICINE

## 2022-10-11 PROCEDURE — 36415 COLL VENOUS BLD VENIPUNCTURE: CPT

## 2022-10-11 PROCEDURE — 99214 OFFICE O/P EST MOD 30 MIN: CPT | Performed by: INTERNAL MEDICINE

## 2022-10-11 PROCEDURE — 80053 COMPREHEN METABOLIC PANEL: CPT

## 2022-10-11 PROCEDURE — 36591 DRAW BLOOD OFF VENOUS DEVICE: CPT | Performed by: INTERNAL MEDICINE

## 2022-10-11 PROCEDURE — 85025 COMPLETE CBC W/AUTO DIFF WBC: CPT

## 2022-10-11 PROCEDURE — G0463 HOSPITAL OUTPT CLINIC VISIT: HCPCS | Performed by: INTERNAL MEDICINE

## 2022-10-11 RX ORDER — HEPARIN SODIUM (PORCINE) LOCK FLUSH IV SOLN 100 UNIT/ML 100 UNIT/ML
500 SOLUTION INTRAVENOUS AS NEEDED
Status: CANCELLED | OUTPATIENT
Start: 2022-11-04

## 2022-10-11 RX ORDER — SODIUM CHLORIDE 0.9 % (FLUSH) 0.9 %
10 SYRINGE (ML) INJECTION AS NEEDED
Status: CANCELLED | OUTPATIENT
Start: 2022-10-11

## 2022-10-11 RX ORDER — HEPARIN SODIUM (PORCINE) LOCK FLUSH IV SOLN 100 UNIT/ML 100 UNIT/ML
500 SOLUTION INTRAVENOUS AS NEEDED
Status: DISCONTINUED | OUTPATIENT
Start: 2022-10-11 | End: 2022-10-11 | Stop reason: HOSPADM

## 2022-10-11 RX ORDER — SODIUM CHLORIDE 0.9 % (FLUSH) 0.9 %
10 SYRINGE (ML) INJECTION AS NEEDED
Status: DISCONTINUED | OUTPATIENT
Start: 2022-10-11 | End: 2022-10-11 | Stop reason: HOSPADM

## 2022-10-11 RX ADMIN — HEPARIN 500 UNITS: 100 SYRINGE at 08:29

## 2022-10-12 ENCOUNTER — RADIATION ONCOLOGY WEEKLY ASSESSMENT (OUTPATIENT)
Dept: RADIATION ONCOLOGY | Facility: HOSPITAL | Age: 62
End: 2022-10-12

## 2022-10-12 ENCOUNTER — HOSPITAL ENCOUNTER (OUTPATIENT)
Dept: RADIATION ONCOLOGY | Facility: HOSPITAL | Age: 62
Discharge: HOME | End: 2022-10-12

## 2022-10-12 VITALS
BODY MASS INDEX: 22.1 KG/M2 | TEMPERATURE: 96.6 F | WEIGHT: 124.78 LBS | DIASTOLIC BLOOD PRESSURE: 64 MMHG | RESPIRATION RATE: 16 BRPM | HEART RATE: 80 BPM | SYSTOLIC BLOOD PRESSURE: 154 MMHG

## 2022-10-12 DIAGNOSIS — C50.919 MALIGNANT NEOPLASM OF FEMALE BREAST, UNSPECIFIED ESTROGEN RECEPTOR STATUS, UNSPECIFIED LATERALITY, UNSPECIFIED SITE OF BREAST: Primary | ICD-10-CM

## 2022-10-12 LAB
RAD ONC ARIA COURSE ID: 1
RAD ONC ARIA COURSE INTENT: NORMAL
RAD ONC ARIA COURSE LAST TREATMENT DATE: NORMAL
RAD ONC ARIA COURSE START DATE: NORMAL
RAD ONC ARIA COURSE TREATMENT ELAPSED DAYS: 0
RAD ONC ARIA FIRST TREATMENT DATE: NORMAL
RAD ONC ARIA PLAN FRACTIONS TREATED TO DATE: 1
RAD ONC ARIA PLAN ID: NORMAL
RAD ONC ARIA PLAN NAME: NORMAL
RAD ONC ARIA PLAN PRESCRIBED DOSE PER FRACTION: 3 GY
RAD ONC ARIA PLAN PRIMARY REFERENCE POINT: NORMAL
RAD ONC ARIA PLAN TOTAL FRACTIONS PRESCRIBED: 15
RAD ONC ARIA PLAN TOTAL PRESCRIBED DOSE: 4500 CGY
RAD ONC ARIA REFERENCE POINT DOSAGE GIVEN TO DATE: 3 GY
RAD ONC ARIA REFERENCE POINT ID: NORMAL
RAD ONC ARIA REFERENCE POINT SESSION DOSAGE GIVEN: 3 GY

## 2022-10-12 PROCEDURE — 77385: CPT | Performed by: STUDENT IN AN ORGANIZED HEALTH CARE EDUCATION/TRAINING PROGRAM

## 2022-10-12 NOTE — PROGRESS NOTES
On Treatment Visit     Patient: Radha Duarte   YOB: 1960   Medical Record Number: [id number]     Date of Visit  October 12, 2022   Primary Diagnosis:  Cancer Staging   Malignant neoplasm of female breast (HCC)  Staging form: Breast, AJCC 8th Edition  - Clinical stage from 5/15/2022: Stage IV (cT3, cN2a, pM1, G3, ER-, PA-, HER2-) - Signed by Trisha Meade MD on 5/15/2022      Ms. Duarte was seen today for an on treatment visit. She is receiving radiation therapy to the right breast and lymph nodes.    Treatment Site Technique Dose per fraction (cGy) Fractions Total dose (cGy)   Right breast  Lymph nodes IMRT 267  300 1/15 267/4005  300/4500     Concurrent chemotherapy: none    She is doing well today. No significant changes.    Pain Score    10/12/22 1546   PainSc: 0-No pain     Radha Duarte reports a pain score of 0.  Given her pain assessment as noted, treatment options were discussed and the following options were decided upon as a follow-up plan to address the patient's pain: continuation of current treatment plan for pain.    Vitals:    10/12/22 1546   BP: 154/64   Pulse: 80   Resp: 16   Temp: 96.6 °F (35.9 °C)       Wt Readings from Last 3 Encounters:   10/12/22 56.6 kg (124 lb 12.5 oz)   10/11/22 56.6 kg (124 lb 12.8 oz)   10/04/22 59 kg (130 lb)       On exam, she is sitting up in no distress, breathing comfortably on room air.    Plan: I have reviewed treatment setup notes and checked and approved the daily guidance images. I reviewed dose delivery and treatment parameters and deemed them appropriate. Continue radiation as prescribed.    Electronically signed by Radha Srivastava MD 10/12/22 15:51 CDT

## 2022-10-12 NOTE — PROGRESS NOTES
Subjective     Radha Duarte seen in follow-up for metastatic breast cancer.  She received Trodelvy day 1 cycle 1 last week.  Tolerated treatment well.  She does report right arm swelling has improved since last week.  She is planning to get radiation treatment started starting tomorrow.  Her nausea has been well controlled on antinausea medication.  She feels well.  Denies any fever or chills.  Overall pain has been well controlled    Past Medical History, Past Surgical History, Social History, Family History have been reviewed and are without significant changes except as mentioned.      Medications:  The current medication list was reviewed in the EMR    ALLERGIES:    Allergies   Allergen Reactions   • Percocet [Oxycodone-Acetaminophen] Nausea Only       Objective      Vitals:    10/11/22 0804   BP: 143/53   Pulse: 80   Resp: 18   Temp: 96.6 °F (35.9 °C)   SpO2: 98%   Weight: 56.6 kg (124 lb 12.8 oz)   PainSc: 0-No pain     Current Status 6/7/2022   ECOG score 0       Physical Exam  Vitals and nursing note reviewed. Exam conducted with a chaperone present.   Constitutional:       Appearance: Normal appearance.   Cardiovascular:      Rate and Rhythm: Normal rate and regular rhythm.   Abdominal:      General: There is no distension.      Palpations: Abdomen is soft. There is no mass.      Tenderness: There is no abdominal tenderness.   Musculoskeletal:      Comments: Right arm swelling, right breast mass, metastatic subcutaneous nodule stable.   Neurological:      Mental Status: She is alert.   Psychiatric:         Mood and Affect: Mood normal.         Behavior: Behavior normal.         Thought Content: Thought content normal.               RECENT LABS:Independently reviewed and summarized  Hematology WBC   Date Value Ref Range Status   10/11/2022 4.33 3.40 - 10.80 10*3/mm3 Final     RBC   Date Value Ref Range Status   10/11/2022 2.73 (L) 3.77 - 5.28 10*6/mm3 Final     Hemoglobin   Date Value Ref Range  Status   10/11/2022 9.5 (L) 12.0 - 15.9 g/dL Final     Hematocrit   Date Value Ref Range Status   10/11/2022 28.3 (L) 34.0 - 46.6 % Final     Platelets   Date Value Ref Range Status   10/11/2022 150 140 - 450 10*3/mm3 Final       Lab Results   Component Value Date    GLUCOSE 111 (H) 10/11/2022    BUN 11 10/11/2022    CREATININE 0.64 10/11/2022    BCR 17.2 10/11/2022    K 4.1 10/11/2022    CO2 25.0 10/11/2022    CALCIUM 8.8 10/11/2022    ALBUMIN 3.90 10/11/2022    AST 22 10/11/2022    ALT 10 10/11/2022          Radha Duarte reports a pain score of 0.  Given her pain assessment as noted, treatment options were discussed and the following options were decided upon as a follow-up plan to address the patient's pain: continuation of current treatment plan for pain.    Patient screened negative for depression based on a PHQ-9 score of 0 on 10/11/2022.    Diagnosis:    (1) Metastatic breast cancer with distant LN (mediastinal) metastases   Triple negative   Unable to do perform foundation one due to limited tissue      Current therapy:   Weekly carboplatin/paclitaxel     (4/22/22- 7/7/22)      Discontinue to pain peripheral neuropathy in lower extremities.      Switch to XELODA (7//22/22)      PET scan on 9/19/22 showed progressive disease, mainly in right breast/chest wall/regional nodes.      Palliative RT per radiation oncology.   Given she is symptomatic we decided to start her on Trodelvy while awaiting radiation treatment.        (2) Cancer associated pain   (3) Right upper extremity DVT   (4) Mucositis   (5) Unintentional weight loss   (6) Chemotherapy induce peripheral neuropathy   (7) Mucositis     Assessment & Plan     (1) Metastatic breast cancer with distant LN (mediastinal) metastases     chronic, stable.    Was treated with carboplatin/paclitaxel ( discontinue to neuropathy), xeloda (progressive disease).   Plan for RT to right breast/chest wall/regional LN.     She was given day 1 cycle 1 Trodelvy  last week.  Tolerated treatment well without any major side effect.  She does report improvement in the swelling in the right arm since receiving chemotherapy.  Radiation planning has been completed and she will start radiation treatment to right breast mass, chest wall and regional lymph nodes starting tomorrow.    While she is receiving radiation treatment I will hold her systemic chemotherapy.  I will plan to see her back in 3 weeks with CBC, CMP with plan to resume Trodelvy on her follow-up appointment.    (2) Cancer associated pain     Chronic, stable.  She is on MS Contin 30 mg twice a day.  She will continue this.    (3) Right upper extremity DVT     Chronic, stable.  She is on Eliquis.  No new concern for bleeding or thrombosis.  Continue to monitor.    (4) Mucositis     Chronic, stable.  As needed Magic mouthwash.    (5) Unintentional weight loss    Chronic, stable.  Recommend high-calorie high-protein diet     (6) Chemotherapy induce peripheral neuropathy     Chronic, stable.  She is on Neurontin 400 mg 3 times daily.  She will continue this.        10/11/2022      CC:           Detail Level: Simple Comment: Per pt she had something removed her “years” ago that was benign and not cancer. Render Risk Assessment In Note?: no

## 2022-10-13 ENCOUNTER — HOSPITAL ENCOUNTER (OUTPATIENT)
Dept: RADIATION ONCOLOGY | Facility: HOSPITAL | Age: 62
Discharge: HOME | End: 2022-10-13

## 2022-10-13 ENCOUNTER — DOCUMENTATION (OUTPATIENT)
Dept: NUTRITION | Facility: HOSPITAL | Age: 62
End: 2022-10-13

## 2022-10-13 LAB
RAD ONC ARIA COURSE ID: 1
RAD ONC ARIA COURSE INTENT: NORMAL
RAD ONC ARIA COURSE LAST TREATMENT DATE: NORMAL
RAD ONC ARIA COURSE START DATE: NORMAL
RAD ONC ARIA COURSE TREATMENT ELAPSED DAYS: 1
RAD ONC ARIA FIRST TREATMENT DATE: NORMAL
RAD ONC ARIA PLAN FRACTIONS TREATED TO DATE: 2
RAD ONC ARIA PLAN ID: NORMAL
RAD ONC ARIA PLAN NAME: NORMAL
RAD ONC ARIA PLAN PRESCRIBED DOSE PER FRACTION: 3 GY
RAD ONC ARIA PLAN PRIMARY REFERENCE POINT: NORMAL
RAD ONC ARIA PLAN TOTAL FRACTIONS PRESCRIBED: 15
RAD ONC ARIA PLAN TOTAL PRESCRIBED DOSE: 4500 CGY
RAD ONC ARIA REFERENCE POINT DOSAGE GIVEN TO DATE: 6 GY
RAD ONC ARIA REFERENCE POINT ID: NORMAL
RAD ONC ARIA REFERENCE POINT SESSION DOSAGE GIVEN: 3 GY

## 2022-10-13 PROCEDURE — 77014 CHG CT GUIDANCE RADIATION THERAPY FLDS PLACEMENT: CPT | Performed by: STUDENT IN AN ORGANIZED HEALTH CARE EDUCATION/TRAINING PROGRAM

## 2022-10-13 PROCEDURE — 77385: CPT | Performed by: STUDENT IN AN ORGANIZED HEALTH CARE EDUCATION/TRAINING PROGRAM

## 2022-10-13 PROCEDURE — 77427 RADIATION TX MANAGEMENT X5: CPT | Performed by: STUDENT IN AN ORGANIZED HEALTH CARE EDUCATION/TRAINING PROGRAM

## 2022-10-13 NOTE — PROGRESS NOTES
Adult Outpatient Nutrition  Assessment    Patient Name:  Radha Duarte  YOB: 1960  MRN: [id number]    Assessment Date:  10/13/2022    Comments:  Follow-up visit to check nutrition. Wt 124.9 lb--up/down (overall stable). Alb 3.9. Recently started rad to breast. States appetite/intake better since completed chemo. Provided samples strawberry/isabella Boost Plus/Ensure Complete. Praised pt for placing emphasis on nutrition/hydration.                    Electronically signed by:  Dolores Hinson RD  10/13/22 10:07 CDT

## 2022-10-14 ENCOUNTER — HOSPITAL ENCOUNTER (OUTPATIENT)
Dept: RADIATION ONCOLOGY | Facility: HOSPITAL | Age: 62
Discharge: HOME | End: 2022-10-14

## 2022-10-14 LAB
RAD ONC ARIA COURSE ID: 1
RAD ONC ARIA COURSE INTENT: NORMAL
RAD ONC ARIA COURSE LAST TREATMENT DATE: NORMAL
RAD ONC ARIA COURSE START DATE: NORMAL
RAD ONC ARIA COURSE TREATMENT ELAPSED DAYS: 2
RAD ONC ARIA FIRST TREATMENT DATE: NORMAL
RAD ONC ARIA PLAN FRACTIONS TREATED TO DATE: 3
RAD ONC ARIA PLAN ID: NORMAL
RAD ONC ARIA PLAN NAME: NORMAL
RAD ONC ARIA PLAN PRESCRIBED DOSE PER FRACTION: 3 GY
RAD ONC ARIA PLAN PRIMARY REFERENCE POINT: NORMAL
RAD ONC ARIA PLAN TOTAL FRACTIONS PRESCRIBED: 15
RAD ONC ARIA PLAN TOTAL PRESCRIBED DOSE: 4500 CGY
RAD ONC ARIA REFERENCE POINT DOSAGE GIVEN TO DATE: 9 GY
RAD ONC ARIA REFERENCE POINT ID: NORMAL
RAD ONC ARIA REFERENCE POINT SESSION DOSAGE GIVEN: 3 GY

## 2022-10-14 PROCEDURE — 77014 CHG CT GUIDANCE RADIATION THERAPY FLDS PLACEMENT: CPT | Performed by: STUDENT IN AN ORGANIZED HEALTH CARE EDUCATION/TRAINING PROGRAM

## 2022-10-14 PROCEDURE — 77385: CPT | Performed by: STUDENT IN AN ORGANIZED HEALTH CARE EDUCATION/TRAINING PROGRAM

## 2022-10-17 ENCOUNTER — HOSPITAL ENCOUNTER (OUTPATIENT)
Dept: RADIATION ONCOLOGY | Facility: HOSPITAL | Age: 62
Discharge: HOME | End: 2022-10-17

## 2022-10-17 LAB
RAD ONC ARIA COURSE ID: 1
RAD ONC ARIA COURSE INTENT: NORMAL
RAD ONC ARIA COURSE LAST TREATMENT DATE: NORMAL
RAD ONC ARIA COURSE START DATE: NORMAL
RAD ONC ARIA COURSE TREATMENT ELAPSED DAYS: 5
RAD ONC ARIA FIRST TREATMENT DATE: NORMAL
RAD ONC ARIA PLAN FRACTIONS TREATED TO DATE: 4
RAD ONC ARIA PLAN ID: NORMAL
RAD ONC ARIA PLAN NAME: NORMAL
RAD ONC ARIA PLAN PRESCRIBED DOSE PER FRACTION: 3 GY
RAD ONC ARIA PLAN PRIMARY REFERENCE POINT: NORMAL
RAD ONC ARIA PLAN TOTAL FRACTIONS PRESCRIBED: 15
RAD ONC ARIA PLAN TOTAL PRESCRIBED DOSE: 4500 CGY
RAD ONC ARIA REFERENCE POINT DOSAGE GIVEN TO DATE: 12 GY
RAD ONC ARIA REFERENCE POINT ID: NORMAL
RAD ONC ARIA REFERENCE POINT SESSION DOSAGE GIVEN: 3 GY

## 2022-10-17 PROCEDURE — 77385: CPT | Performed by: RADIOLOGY

## 2022-10-17 PROCEDURE — 77336 RADIATION PHYSICS CONSULT: CPT | Performed by: RADIOLOGY

## 2022-10-17 PROCEDURE — 77014 CHG CT GUIDANCE RADIATION THERAPY FLDS PLACEMENT: CPT | Performed by: STUDENT IN AN ORGANIZED HEALTH CARE EDUCATION/TRAINING PROGRAM

## 2022-10-18 ENCOUNTER — HOSPITAL ENCOUNTER (OUTPATIENT)
Dept: RADIATION ONCOLOGY | Facility: HOSPITAL | Age: 62
Discharge: HOME | End: 2022-10-18

## 2022-10-18 LAB
RAD ONC ARIA COURSE ID: 1
RAD ONC ARIA COURSE INTENT: NORMAL
RAD ONC ARIA COURSE LAST TREATMENT DATE: NORMAL
RAD ONC ARIA COURSE START DATE: NORMAL
RAD ONC ARIA COURSE TREATMENT ELAPSED DAYS: 6
RAD ONC ARIA FIRST TREATMENT DATE: NORMAL
RAD ONC ARIA PLAN FRACTIONS TREATED TO DATE: 5
RAD ONC ARIA PLAN ID: NORMAL
RAD ONC ARIA PLAN NAME: NORMAL
RAD ONC ARIA PLAN PRESCRIBED DOSE PER FRACTION: 3 GY
RAD ONC ARIA PLAN PRIMARY REFERENCE POINT: NORMAL
RAD ONC ARIA PLAN TOTAL FRACTIONS PRESCRIBED: 15
RAD ONC ARIA PLAN TOTAL PRESCRIBED DOSE: 4500 CGY
RAD ONC ARIA REFERENCE POINT DOSAGE GIVEN TO DATE: 15 GY
RAD ONC ARIA REFERENCE POINT ID: NORMAL
RAD ONC ARIA REFERENCE POINT SESSION DOSAGE GIVEN: 3 GY

## 2022-10-18 PROCEDURE — 77385: CPT | Performed by: RADIOLOGY

## 2022-10-18 PROCEDURE — 77014 CHG CT GUIDANCE RADIATION THERAPY FLDS PLACEMENT: CPT | Performed by: STUDENT IN AN ORGANIZED HEALTH CARE EDUCATION/TRAINING PROGRAM

## 2022-10-19 ENCOUNTER — HOSPITAL ENCOUNTER (OUTPATIENT)
Dept: RADIATION ONCOLOGY | Facility: HOSPITAL | Age: 62
Discharge: HOME | End: 2022-10-19

## 2022-10-19 ENCOUNTER — RADIATION ONCOLOGY WEEKLY ASSESSMENT (OUTPATIENT)
Dept: RADIATION ONCOLOGY | Facility: HOSPITAL | Age: 62
End: 2022-10-19
Payer: OTHER GOVERNMENT

## 2022-10-19 VITALS
DIASTOLIC BLOOD PRESSURE: 55 MMHG | SYSTOLIC BLOOD PRESSURE: 118 MMHG | WEIGHT: 121.8 LBS | HEART RATE: 82 BPM | TEMPERATURE: 96.7 F | BODY MASS INDEX: 21.58 KG/M2

## 2022-10-19 DIAGNOSIS — C50.919 MALIGNANT NEOPLASM OF FEMALE BREAST, UNSPECIFIED ESTROGEN RECEPTOR STATUS, UNSPECIFIED LATERALITY, UNSPECIFIED SITE OF BREAST: Primary | ICD-10-CM

## 2022-10-19 DIAGNOSIS — G62.0 CHEMOTHERAPY-INDUCED PERIPHERAL NEUROPATHY: ICD-10-CM

## 2022-10-19 DIAGNOSIS — T45.1X5A CHEMOTHERAPY-INDUCED PERIPHERAL NEUROPATHY: ICD-10-CM

## 2022-10-19 LAB
RAD ONC ARIA COURSE ID: 1
RAD ONC ARIA COURSE INTENT: NORMAL
RAD ONC ARIA COURSE LAST TREATMENT DATE: NORMAL
RAD ONC ARIA COURSE START DATE: NORMAL
RAD ONC ARIA COURSE TREATMENT ELAPSED DAYS: 7
RAD ONC ARIA FIRST TREATMENT DATE: NORMAL
RAD ONC ARIA PLAN FRACTIONS TREATED TO DATE: 6
RAD ONC ARIA PLAN ID: NORMAL
RAD ONC ARIA PLAN NAME: NORMAL
RAD ONC ARIA PLAN PRESCRIBED DOSE PER FRACTION: 3 GY
RAD ONC ARIA PLAN PRIMARY REFERENCE POINT: NORMAL
RAD ONC ARIA PLAN TOTAL FRACTIONS PRESCRIBED: 15
RAD ONC ARIA PLAN TOTAL PRESCRIBED DOSE: 4500 CGY
RAD ONC ARIA REFERENCE POINT DOSAGE GIVEN TO DATE: 18 GY
RAD ONC ARIA REFERENCE POINT ID: NORMAL
RAD ONC ARIA REFERENCE POINT SESSION DOSAGE GIVEN: 3 GY

## 2022-10-19 PROCEDURE — 77385: CPT | Performed by: RADIOLOGY

## 2022-10-19 PROCEDURE — 77014 CHG CT GUIDANCE RADIATION THERAPY FLDS PLACEMENT: CPT | Performed by: STUDENT IN AN ORGANIZED HEALTH CARE EDUCATION/TRAINING PROGRAM

## 2022-10-19 RX ORDER — GABAPENTIN 400 MG/1
CAPSULE ORAL
Qty: 90 CAPSULE | Refills: 0 | Status: SHIPPED | OUTPATIENT
Start: 2022-10-19 | End: 2022-10-20 | Stop reason: SDUPTHER

## 2022-10-19 NOTE — PROGRESS NOTES
On Treatment Visit     Patient: Radha Duarte   YOB: 1960   Medical Record Number: [id number]     Date of Visit  October 19, 2022   Primary Diagnosis:  Cancer Staging   Malignant neoplasm of female breast (HCC)  Staging form: Breast, AJCC 8th Edition  - Clinical stage from 5/15/2022: Stage IV (cT3, cN2a, pM1, G3, ER-, [place]-, HER2-) - Signed by Trisha Meade MD on 5/15/2022      Ms. Duarte was seen today for an on treatment visit. She is receiving radiation therapy to the right breast.  She has received 6 of her 15 planned treatments today.    Treatment Site Technique Dose per fraction (cGy) Fractions Total dose (cGy)   Right breast  IMRT  300 6 1800       She is doing very well today.         Wt Readings from Last 3 Encounters:   10/19/22 55.2 kg (121 lb 12.8 oz)   10/12/22 56.6 kg (124 lb 12.5 oz)   10/11/22 56.6 kg (124 lb 12.8 oz)       On exam, she is sitting up in no distress, breathing comfortably on room air. Female nurse chaperone was present for the entire visit.    Plan: I have reviewed treatment setup notes and checked and approved the daily guidance images. I reviewed dose delivery and treatment parameters and deemed them appropriate. Continue radiation as prescribed.    @Gall[facility] [email or web address]@

## 2022-10-19 NOTE — TELEPHONE ENCOUNTER
Rx Refill Note  Requested Prescriptions     Pending Prescriptions Disp Refills   • gabapentin (NEURONTIN) 400 MG capsule [Pharmacy Med Name: GABAPENTIN CAPS 400MG] 90 capsule 0     Sig: TAKE 1 CAPSULE THREE TIMES A DAY      Last office visit with prescribing clinician: 10/11/2022      Next office visit with prescribing clinician: 11/4/2022            Nasrin Zuniga RN  10/19/22, 16:10 CDT

## 2022-10-20 ENCOUNTER — HOSPITAL ENCOUNTER (OUTPATIENT)
Dept: RADIATION ONCOLOGY | Facility: HOSPITAL | Age: 62
Discharge: HOME | End: 2022-10-20

## 2022-10-20 DIAGNOSIS — G89.3 CANCER ASSOCIATED PAIN: ICD-10-CM

## 2022-10-20 DIAGNOSIS — T45.1X5A CHEMOTHERAPY-INDUCED PERIPHERAL NEUROPATHY: ICD-10-CM

## 2022-10-20 DIAGNOSIS — G62.0 CHEMOTHERAPY-INDUCED PERIPHERAL NEUROPATHY: ICD-10-CM

## 2022-10-20 LAB
RAD ONC ARIA COURSE ID: 1
RAD ONC ARIA COURSE INTENT: NORMAL
RAD ONC ARIA COURSE LAST TREATMENT DATE: NORMAL
RAD ONC ARIA COURSE START DATE: NORMAL
RAD ONC ARIA COURSE TREATMENT ELAPSED DAYS: 8
RAD ONC ARIA FIRST TREATMENT DATE: NORMAL
RAD ONC ARIA PLAN FRACTIONS TREATED TO DATE: 7
RAD ONC ARIA PLAN ID: NORMAL
RAD ONC ARIA PLAN NAME: NORMAL
RAD ONC ARIA PLAN PRESCRIBED DOSE PER FRACTION: 3 GY
RAD ONC ARIA PLAN PRIMARY REFERENCE POINT: NORMAL
RAD ONC ARIA PLAN TOTAL FRACTIONS PRESCRIBED: 15
RAD ONC ARIA PLAN TOTAL PRESCRIBED DOSE: 4500 CGY
RAD ONC ARIA REFERENCE POINT DOSAGE GIVEN TO DATE: 21 GY
RAD ONC ARIA REFERENCE POINT ID: NORMAL
RAD ONC ARIA REFERENCE POINT SESSION DOSAGE GIVEN: 3 GY

## 2022-10-20 PROCEDURE — 77385: CPT | Performed by: RADIOLOGY

## 2022-10-20 PROCEDURE — 77014 CHG CT GUIDANCE RADIATION THERAPY FLDS PLACEMENT: CPT | Performed by: STUDENT IN AN ORGANIZED HEALTH CARE EDUCATION/TRAINING PROGRAM

## 2022-10-20 PROCEDURE — 77427 RADIATION TX MANAGEMENT X5: CPT | Performed by: STUDENT IN AN ORGANIZED HEALTH CARE EDUCATION/TRAINING PROGRAM

## 2022-10-20 RX ORDER — GABAPENTIN 400 MG/1
400 CAPSULE ORAL 3 TIMES DAILY
Qty: 90 CAPSULE | Refills: 0 | Status: SHIPPED | OUTPATIENT
Start: 2022-10-20 | End: 2022-11-11

## 2022-10-20 RX ORDER — MORPHINE SULFATE 15 MG/1
30 TABLET, FILM COATED, EXTENDED RELEASE ORAL 2 TIMES DAILY
Qty: 90 TABLET | Refills: 0 | Status: SHIPPED | OUTPATIENT
Start: 2022-10-20 | End: 2022-11-17 | Stop reason: SDUPTHER

## 2022-10-20 NOTE — TELEPHONE ENCOUNTER
Rx Refill Note  Requested Prescriptions     Pending Prescriptions Disp Refills   • gabapentin (NEURONTIN) 400 MG capsule 90 capsule 0     Sig: Take 1 capsule by mouth 3 (Three) Times a Day.   • Morphine (MS CONTIN) 15 MG 12 hr tablet 90 tablet 0     Sig: Take 2 tablets by mouth 2 (Two) Times a Day.      Last office visit with prescribing clinician: 10/11/2022      Next office visit with prescribing clinician: 11/4/2022            Nasrin Zuniga RN  10/20/22, 09:08 CDT

## 2022-10-21 ENCOUNTER — APPOINTMENT (OUTPATIENT)
Dept: ONCOLOGY | Facility: HOSPITAL | Age: 62
End: 2022-10-21

## 2022-10-21 ENCOUNTER — APPOINTMENT (OUTPATIENT)
Dept: ONCOLOGY | Facility: CLINIC | Age: 62
End: 2022-10-21

## 2022-10-21 ENCOUNTER — HOSPITAL ENCOUNTER (OUTPATIENT)
Dept: RADIATION ONCOLOGY | Facility: HOSPITAL | Age: 62
Discharge: HOME | End: 2022-10-21

## 2022-10-21 LAB
RAD ONC ARIA COURSE ID: 1
RAD ONC ARIA COURSE INTENT: NORMAL
RAD ONC ARIA COURSE LAST TREATMENT DATE: NORMAL
RAD ONC ARIA COURSE START DATE: NORMAL
RAD ONC ARIA COURSE TREATMENT ELAPSED DAYS: 9
RAD ONC ARIA FIRST TREATMENT DATE: NORMAL
RAD ONC ARIA PLAN FRACTIONS TREATED TO DATE: 8
RAD ONC ARIA PLAN ID: NORMAL
RAD ONC ARIA PLAN NAME: NORMAL
RAD ONC ARIA PLAN PRESCRIBED DOSE PER FRACTION: 3 GY
RAD ONC ARIA PLAN PRIMARY REFERENCE POINT: NORMAL
RAD ONC ARIA PLAN TOTAL FRACTIONS PRESCRIBED: 15
RAD ONC ARIA PLAN TOTAL PRESCRIBED DOSE: 4500 CGY
RAD ONC ARIA REFERENCE POINT DOSAGE GIVEN TO DATE: 24 GY
RAD ONC ARIA REFERENCE POINT ID: NORMAL
RAD ONC ARIA REFERENCE POINT SESSION DOSAGE GIVEN: 3 GY

## 2022-10-21 PROCEDURE — 77014 CHG CT GUIDANCE RADIATION THERAPY FLDS PLACEMENT: CPT | Performed by: STUDENT IN AN ORGANIZED HEALTH CARE EDUCATION/TRAINING PROGRAM

## 2022-10-21 PROCEDURE — 77385: CPT | Performed by: RADIOLOGY

## 2022-10-21 RX ORDER — PROMETHAZINE HYDROCHLORIDE 12.5 MG/1
12.5 TABLET ORAL EVERY 6 HOURS PRN
Qty: 90 TABLET | Refills: 0 | Status: SHIPPED | OUTPATIENT
Start: 2022-10-21 | End: 2022-11-02 | Stop reason: SDUPTHER

## 2022-10-21 NOTE — TELEPHONE ENCOUNTER
Rx Refill Note  Requested Prescriptions     Pending Prescriptions Disp Refills   • promethazine (PHENERGAN) 12.5 MG tablet 90 tablet 0     Sig: Take 1 tablet by mouth Every 6 (Six) Hours As Needed for Nausea.      Last office visit with prescribing clinician: 10/11/2022      Next office visit with prescribing clinician: 11/4/2022            Nasrin Zuniga RN  10/21/22, 08:39 CDT

## 2022-10-24 ENCOUNTER — HOSPITAL ENCOUNTER (OUTPATIENT)
Dept: RADIATION ONCOLOGY | Facility: HOSPITAL | Age: 62
Discharge: HOME | End: 2022-10-24

## 2022-10-24 LAB
RAD ONC ARIA COURSE ID: 1
RAD ONC ARIA COURSE INTENT: NORMAL
RAD ONC ARIA COURSE LAST TREATMENT DATE: NORMAL
RAD ONC ARIA COURSE START DATE: NORMAL
RAD ONC ARIA COURSE TREATMENT ELAPSED DAYS: 12
RAD ONC ARIA FIRST TREATMENT DATE: NORMAL
RAD ONC ARIA PLAN FRACTIONS TREATED TO DATE: 9
RAD ONC ARIA PLAN ID: NORMAL
RAD ONC ARIA PLAN NAME: NORMAL
RAD ONC ARIA PLAN PRESCRIBED DOSE PER FRACTION: 3 GY
RAD ONC ARIA PLAN PRIMARY REFERENCE POINT: NORMAL
RAD ONC ARIA PLAN TOTAL FRACTIONS PRESCRIBED: 15
RAD ONC ARIA PLAN TOTAL PRESCRIBED DOSE: 4500 CGY
RAD ONC ARIA REFERENCE POINT DOSAGE GIVEN TO DATE: 27 GY
RAD ONC ARIA REFERENCE POINT ID: NORMAL
RAD ONC ARIA REFERENCE POINT SESSION DOSAGE GIVEN: 3 GY

## 2022-10-24 PROCEDURE — 77014 CHG CT GUIDANCE RADIATION THERAPY FLDS PLACEMENT: CPT | Performed by: STUDENT IN AN ORGANIZED HEALTH CARE EDUCATION/TRAINING PROGRAM

## 2022-10-24 PROCEDURE — 77385: CPT | Performed by: STUDENT IN AN ORGANIZED HEALTH CARE EDUCATION/TRAINING PROGRAM

## 2022-10-25 ENCOUNTER — HOSPITAL ENCOUNTER (OUTPATIENT)
Dept: RADIATION ONCOLOGY | Facility: HOSPITAL | Age: 62
Discharge: HOME | End: 2022-10-25

## 2022-10-25 LAB
RAD ONC ARIA COURSE ID: 1
RAD ONC ARIA COURSE INTENT: NORMAL
RAD ONC ARIA COURSE LAST TREATMENT DATE: NORMAL
RAD ONC ARIA COURSE START DATE: NORMAL
RAD ONC ARIA COURSE TREATMENT ELAPSED DAYS: 13
RAD ONC ARIA FIRST TREATMENT DATE: NORMAL
RAD ONC ARIA PLAN FRACTIONS TREATED TO DATE: 10
RAD ONC ARIA PLAN ID: NORMAL
RAD ONC ARIA PLAN NAME: NORMAL
RAD ONC ARIA PLAN PRESCRIBED DOSE PER FRACTION: 3 GY
RAD ONC ARIA PLAN PRIMARY REFERENCE POINT: NORMAL
RAD ONC ARIA PLAN TOTAL FRACTIONS PRESCRIBED: 15
RAD ONC ARIA PLAN TOTAL PRESCRIBED DOSE: 4500 CGY
RAD ONC ARIA REFERENCE POINT DOSAGE GIVEN TO DATE: 30 GY
RAD ONC ARIA REFERENCE POINT ID: NORMAL
RAD ONC ARIA REFERENCE POINT SESSION DOSAGE GIVEN: 3 GY

## 2022-10-25 PROCEDURE — 77385: CPT | Performed by: STUDENT IN AN ORGANIZED HEALTH CARE EDUCATION/TRAINING PROGRAM

## 2022-10-25 PROCEDURE — 77014 CHG CT GUIDANCE RADIATION THERAPY FLDS PLACEMENT: CPT | Performed by: STUDENT IN AN ORGANIZED HEALTH CARE EDUCATION/TRAINING PROGRAM

## 2022-10-25 PROCEDURE — 77336 RADIATION PHYSICS CONSULT: CPT | Performed by: STUDENT IN AN ORGANIZED HEALTH CARE EDUCATION/TRAINING PROGRAM

## 2022-10-26 ENCOUNTER — HOSPITAL ENCOUNTER (OUTPATIENT)
Dept: RADIATION ONCOLOGY | Facility: HOSPITAL | Age: 62
Setting detail: RADIATION/ONCOLOGY SERIES
Discharge: HOME | End: 2022-10-26

## 2022-10-26 ENCOUNTER — HOSPITAL ENCOUNTER (OUTPATIENT)
Dept: RADIATION ONCOLOGY | Facility: HOSPITAL | Age: 62
Discharge: HOME | End: 2022-10-26

## 2022-10-26 VITALS
DIASTOLIC BLOOD PRESSURE: 58 MMHG | HEART RATE: 76 BPM | OXYGEN SATURATION: 98 % | WEIGHT: 120.4 LBS | SYSTOLIC BLOOD PRESSURE: 119 MMHG | BODY MASS INDEX: 21.33 KG/M2 | RESPIRATION RATE: 16 BRPM | TEMPERATURE: 97 F

## 2022-10-26 DIAGNOSIS — C50.919 MALIGNANT NEOPLASM OF FEMALE BREAST, UNSPECIFIED ESTROGEN RECEPTOR STATUS, UNSPECIFIED LATERALITY, UNSPECIFIED SITE OF BREAST: Primary | ICD-10-CM

## 2022-10-26 LAB
RAD ONC ARIA COURSE ID: 1
RAD ONC ARIA COURSE INTENT: NORMAL
RAD ONC ARIA COURSE LAST TREATMENT DATE: NORMAL
RAD ONC ARIA COURSE START DATE: NORMAL
RAD ONC ARIA COURSE TREATMENT ELAPSED DAYS: 14
RAD ONC ARIA FIRST TREATMENT DATE: NORMAL
RAD ONC ARIA PLAN FRACTIONS TREATED TO DATE: 11
RAD ONC ARIA PLAN ID: NORMAL
RAD ONC ARIA PLAN NAME: NORMAL
RAD ONC ARIA PLAN PRESCRIBED DOSE PER FRACTION: 3 GY
RAD ONC ARIA PLAN PRIMARY REFERENCE POINT: NORMAL
RAD ONC ARIA PLAN TOTAL FRACTIONS PRESCRIBED: 15
RAD ONC ARIA PLAN TOTAL PRESCRIBED DOSE: 4500 CGY
RAD ONC ARIA REFERENCE POINT DOSAGE GIVEN TO DATE: 33 GY
RAD ONC ARIA REFERENCE POINT ID: NORMAL
RAD ONC ARIA REFERENCE POINT SESSION DOSAGE GIVEN: 3 GY

## 2022-10-26 PROCEDURE — 77014 CHG CT GUIDANCE RADIATION THERAPY FLDS PLACEMENT: CPT | Performed by: STUDENT IN AN ORGANIZED HEALTH CARE EDUCATION/TRAINING PROGRAM

## 2022-10-26 PROCEDURE — 77385: CPT | Performed by: STUDENT IN AN ORGANIZED HEALTH CARE EDUCATION/TRAINING PROGRAM

## 2022-10-26 NOTE — PROGRESS NOTES
On Treatment Visit     Patient: Radha Duarte   YOB: 1960   Medical Record Number: [id number]     Date of Visit  October 26, 2022   Primary Diagnosis:  Cancer Staging   Malignant neoplasm of female breast (HCC)  Staging form: Breast, AJCC 8th Edition  - Clinical stage from 5/15/2022: Stage IV (cT3, cN2a, pM1, G3, ER-, [place]-, HER2-) - Signed by Trisha Meade MD on 5/15/2022      Ms. Duarte was seen today for an on treatment visit. She is receiving radiation therapy to the right breast and lymph nodes.    Treatment Site Technique Dose per fraction (cGy) Fractions Total dose (cGy)   Right breast  Lymph nodes IMRT 267  300 11/15 2937/4005  3300/4500     Concurrent chemotherapy: none    She notes some new areas of swelling in her right axilla and chest wall. Otherwise pain is controlled with morphine.    Pain Score    10/26/22 0954   PainSc:   2   PainLoc: Generalized     Radha Duarte reports a pain score of 2.  Given her pain assessment as noted, treatment options were discussed and the following options were decided upon as a follow-up plan to address the patient's pain: continuation of current treatment plan for pain.    Vitals:    10/26/22 0954   BP: 119/58   Pulse: 76   Resp: 16   Temp: 97 °F (36.1 °C)   SpO2: 98%       Wt Readings from Last 3 Encounters:   10/26/22 54.6 kg (120 lb 6.4 oz)   10/19/22 55.2 kg (121 lb 12.8 oz)   10/12/22 56.6 kg (124 lb 12.5 oz)       On exam, she is sitting up in no distress, breathing comfortably on room air. Right breast and axilla largely unchanged. Female nurse chaperone present for entire exam.    Plan: I have reviewed treatment setup notes and checked and approved the daily guidance images. I reviewed dose delivery and treatment parameters and deemed them appropriate. Continue radiation as prescribed. Encouraged her to keep an eye on the areas of progression. F/u 1 month from completion.    Electronically signed by Radha Srivastava MD  10/26/22 10:55 CDT

## 2022-10-27 ENCOUNTER — HOSPITAL ENCOUNTER (OUTPATIENT)
Dept: RADIATION ONCOLOGY | Facility: HOSPITAL | Age: 62
Discharge: HOME | End: 2022-10-27

## 2022-10-27 LAB
RAD ONC ARIA COURSE ID: 1
RAD ONC ARIA COURSE INTENT: NORMAL
RAD ONC ARIA COURSE LAST TREATMENT DATE: NORMAL
RAD ONC ARIA COURSE START DATE: NORMAL
RAD ONC ARIA COURSE TREATMENT ELAPSED DAYS: 15
RAD ONC ARIA FIRST TREATMENT DATE: NORMAL
RAD ONC ARIA PLAN FRACTIONS TREATED TO DATE: 12
RAD ONC ARIA PLAN ID: NORMAL
RAD ONC ARIA PLAN NAME: NORMAL
RAD ONC ARIA PLAN PRESCRIBED DOSE PER FRACTION: 3 GY
RAD ONC ARIA PLAN PRIMARY REFERENCE POINT: NORMAL
RAD ONC ARIA PLAN TOTAL FRACTIONS PRESCRIBED: 15
RAD ONC ARIA PLAN TOTAL PRESCRIBED DOSE: 4500 CGY
RAD ONC ARIA REFERENCE POINT DOSAGE GIVEN TO DATE: 36 GY
RAD ONC ARIA REFERENCE POINT ID: NORMAL
RAD ONC ARIA REFERENCE POINT SESSION DOSAGE GIVEN: 3 GY

## 2022-10-27 PROCEDURE — 77385: CPT | Performed by: STUDENT IN AN ORGANIZED HEALTH CARE EDUCATION/TRAINING PROGRAM

## 2022-10-27 PROCEDURE — 77014 CHG CT GUIDANCE RADIATION THERAPY FLDS PLACEMENT: CPT | Performed by: STUDENT IN AN ORGANIZED HEALTH CARE EDUCATION/TRAINING PROGRAM

## 2022-10-27 PROCEDURE — 77427 RADIATION TX MANAGEMENT X5: CPT | Performed by: STUDENT IN AN ORGANIZED HEALTH CARE EDUCATION/TRAINING PROGRAM

## 2022-10-28 ENCOUNTER — HOSPITAL ENCOUNTER (OUTPATIENT)
Dept: RADIATION ONCOLOGY | Facility: HOSPITAL | Age: 62
Discharge: HOME | End: 2022-10-28

## 2022-10-28 DIAGNOSIS — C50.919 MALIGNANT NEOPLASM OF FEMALE BREAST, UNSPECIFIED ESTROGEN RECEPTOR STATUS, UNSPECIFIED LATERALITY, UNSPECIFIED SITE OF BREAST: ICD-10-CM

## 2022-10-28 LAB
RAD ONC ARIA COURSE ID: 1
RAD ONC ARIA COURSE INTENT: NORMAL
RAD ONC ARIA COURSE LAST TREATMENT DATE: NORMAL
RAD ONC ARIA COURSE START DATE: NORMAL
RAD ONC ARIA COURSE TREATMENT ELAPSED DAYS: 16
RAD ONC ARIA FIRST TREATMENT DATE: NORMAL
RAD ONC ARIA PLAN FRACTIONS TREATED TO DATE: 13
RAD ONC ARIA PLAN ID: NORMAL
RAD ONC ARIA PLAN NAME: NORMAL
RAD ONC ARIA PLAN PRESCRIBED DOSE PER FRACTION: 3 GY
RAD ONC ARIA PLAN PRIMARY REFERENCE POINT: NORMAL
RAD ONC ARIA PLAN TOTAL FRACTIONS PRESCRIBED: 15
RAD ONC ARIA PLAN TOTAL PRESCRIBED DOSE: 4500 CGY
RAD ONC ARIA REFERENCE POINT DOSAGE GIVEN TO DATE: 39 GY
RAD ONC ARIA REFERENCE POINT ID: NORMAL
RAD ONC ARIA REFERENCE POINT SESSION DOSAGE GIVEN: 3 GY

## 2022-10-28 PROCEDURE — 77014 CHG CT GUIDANCE RADIATION THERAPY FLDS PLACEMENT: CPT | Performed by: STUDENT IN AN ORGANIZED HEALTH CARE EDUCATION/TRAINING PROGRAM

## 2022-10-28 PROCEDURE — 77385: CPT | Performed by: STUDENT IN AN ORGANIZED HEALTH CARE EDUCATION/TRAINING PROGRAM

## 2022-10-28 RX ORDER — OLANZAPINE 5 MG/1
5 TABLET ORAL NIGHTLY
Qty: 6 TABLET | Refills: 5 | Status: SHIPPED | OUTPATIENT
Start: 2022-10-28 | End: 2022-12-16 | Stop reason: SDUPTHER

## 2022-10-28 NOTE — TELEPHONE ENCOUNTER
Rx Refill Note  Requested Prescriptions     Pending Prescriptions Disp Refills   • OLANZapine (zyPREXA) 5 MG tablet 6 tablet 5     Sig: Take 1 tablet by mouth Every Night. Take on day 2, 3, and 4 and Days 9, 10, 11 after chemotherapy.      Last office visit with prescribing clinician: 10/11/2022      Next office visit with prescribing clinician: 11/4/2022            Juliet Smith RN  10/28/22, 08:58 CDT

## 2022-10-31 ENCOUNTER — HOSPITAL ENCOUNTER (OUTPATIENT)
Dept: RADIATION ONCOLOGY | Facility: HOSPITAL | Age: 62
Discharge: HOME | End: 2022-10-31

## 2022-10-31 LAB
RAD ONC ARIA COURSE ID: 1
RAD ONC ARIA COURSE INTENT: NORMAL
RAD ONC ARIA COURSE LAST TREATMENT DATE: NORMAL
RAD ONC ARIA COURSE START DATE: NORMAL
RAD ONC ARIA COURSE TREATMENT ELAPSED DAYS: 19
RAD ONC ARIA FIRST TREATMENT DATE: NORMAL
RAD ONC ARIA PLAN FRACTIONS TREATED TO DATE: 14
RAD ONC ARIA PLAN ID: NORMAL
RAD ONC ARIA PLAN NAME: NORMAL
RAD ONC ARIA PLAN PRESCRIBED DOSE PER FRACTION: 3 GY
RAD ONC ARIA PLAN PRIMARY REFERENCE POINT: NORMAL
RAD ONC ARIA PLAN TOTAL FRACTIONS PRESCRIBED: 15
RAD ONC ARIA PLAN TOTAL PRESCRIBED DOSE: 4500 CGY
RAD ONC ARIA REFERENCE POINT DOSAGE GIVEN TO DATE: 42 GY
RAD ONC ARIA REFERENCE POINT ID: NORMAL
RAD ONC ARIA REFERENCE POINT SESSION DOSAGE GIVEN: 3 GY

## 2022-10-31 PROCEDURE — 77385: CPT | Performed by: STUDENT IN AN ORGANIZED HEALTH CARE EDUCATION/TRAINING PROGRAM

## 2022-10-31 PROCEDURE — 77014 CHG CT GUIDANCE RADIATION THERAPY FLDS PLACEMENT: CPT | Performed by: STUDENT IN AN ORGANIZED HEALTH CARE EDUCATION/TRAINING PROGRAM

## 2022-10-31 PROCEDURE — 77336 RADIATION PHYSICS CONSULT: CPT | Performed by: STUDENT IN AN ORGANIZED HEALTH CARE EDUCATION/TRAINING PROGRAM

## 2022-11-01 ENCOUNTER — HOSPITAL ENCOUNTER (OUTPATIENT)
Dept: RADIATION ONCOLOGY | Facility: HOSPITAL | Age: 62
Discharge: HOME | End: 2022-11-01

## 2022-11-01 ENCOUNTER — HOSPITAL ENCOUNTER (OUTPATIENT)
Dept: RADIATION ONCOLOGY | Facility: HOSPITAL | Age: 62
Setting detail: RADIATION/ONCOLOGY SERIES
End: 2022-11-01

## 2022-11-01 ENCOUNTER — DOCUMENTATION (OUTPATIENT)
Dept: RADIATION ONCOLOGY | Facility: HOSPITAL | Age: 62
End: 2022-11-01

## 2022-11-01 LAB
RAD ONC ARIA COURSE ID: 1
RAD ONC ARIA COURSE INTENT: NORMAL
RAD ONC ARIA COURSE LAST TREATMENT DATE: NORMAL
RAD ONC ARIA COURSE START DATE: NORMAL
RAD ONC ARIA COURSE TREATMENT ELAPSED DAYS: 20
RAD ONC ARIA FIRST TREATMENT DATE: NORMAL
RAD ONC ARIA PLAN FRACTIONS TREATED TO DATE: 15
RAD ONC ARIA PLAN ID: NORMAL
RAD ONC ARIA PLAN NAME: NORMAL
RAD ONC ARIA PLAN PRESCRIBED DOSE PER FRACTION: 3 GY
RAD ONC ARIA PLAN PRIMARY REFERENCE POINT: NORMAL
RAD ONC ARIA PLAN TOTAL FRACTIONS PRESCRIBED: 15
RAD ONC ARIA PLAN TOTAL PRESCRIBED DOSE: 4500 CGY
RAD ONC ARIA REFERENCE POINT DOSAGE GIVEN TO DATE: 45 GY
RAD ONC ARIA REFERENCE POINT ID: NORMAL
RAD ONC ARIA REFERENCE POINT SESSION DOSAGE GIVEN: 3 GY

## 2022-11-01 PROCEDURE — 77014 CHG CT GUIDANCE RADIATION THERAPY FLDS PLACEMENT: CPT | Performed by: STUDENT IN AN ORGANIZED HEALTH CARE EDUCATION/TRAINING PROGRAM

## 2022-11-01 PROCEDURE — 77385: CPT | Performed by: STUDENT IN AN ORGANIZED HEALTH CARE EDUCATION/TRAINING PROGRAM

## 2022-11-01 NOTE — PATIENT INSTRUCTIONS
Radiation OncologyTreatment Course 10/12/2022 - 11/1/2022:  Radha Duarte received 4500 cGy in 15 fractions to right breast and lymph nodes via IMRT EBRT.     External Beam Radiation Therapy, Care After    What can I expect after my radiation treatments are finished?    Symptoms may last for weeks or months. Most symptoms will go away over time. Sometimes they are permanent. Your doctor will ask you about your symptoms at your follow up visit.    It is common to have:  Fatigue.  Red, flaking, dry skin in the treated area.  A sunburn-like rash on the skin in the treated area.  Hair loss in the treated area.  Itching in the treated area.    Other side effects may occur, depending on which part of the body was exposed to radiation and how much radiation was used. These may include:  Hair loss if the radiation therapy was directed to the head.  Coughing or difficulty swallowing if the radiation therapy was directed to the head, neck, or chest  Nausea, vomiting, or diarrhea if the radiation therapy was directed to the abdomen or pelvis.  Bladder problems, frequent urination, or sexual dysfunction if the radiation therapy was directed to the bladder, kidney, or prostate.  Memory loss and cognitive changes if the radiation therapy was directed to your brain.    Although some side effects may show up months to years later, most side effects are usually temporary and get better over time. It can take up to 3-4 weeks for you to regain your energy or for side effects to get better.       Follow these instructions at home:    Skin care   Skin reactions may worsen for 7 to 10 days after your final treatment. During this time, gently clean and moisturize your skin with the  skin products your radiation team suggested. Let your markings slowly wear off.   Continue to wash your treated skin with a fragrance-free mild soap. Do not scrub or rub your skin. Pat the area dry.  If you received treatment to your head, face, or  neck area, use a mild shampoo and be gentle when washing your hair.  Continue to apply your moisturizer to the treated area .  Protect your skin. Even after the skin in the treatment area has healed, it will be sensitive. Protect your skin from injury and avoid direct sun. If you cannot avoid the sun, use a sunscreen with a SPF of 30 or higher.   Avoid scratching the treated area.  Do not use a heating pad or a warm cloth to relieve pain in the treated area.      Diet  Eat a well-balanced diet.   Drink at least 8 glasses of fluid each day.Drinking fluids will help to speed the healing process.    Avoid caffeine and alcohol.   Return to your normal diet slowly. If you were on a special diet to control the side effects of your treatment, follow this diet until the side effects go away. Then, slowly return to your normal diet to prevent the return of symptoms.     Activity  Pace yourself and adjust your normal routine, as needed. It may take weeks or months before your energy level returns to normal     General instructions   Take over-the-counter and prescription medicines only as told by your health care provider.  Keep all follow-up visits as told by your health care provider. This is important. They are needed to determine if the radiation therapy worked as it was intended to.      Contact the Radiation Team if:  You have pain in the treated area.  The redness worsens in the treated area.  Open skin or blisters develop in the treated area.  You have unexplained weight loss.  You continue to have nausea or vomiting not relieved by your medications, or develop nausea or vomiting in the following weeks after your treatment is completed   You continue to have diarrhea that lasts a long time, or develop diarrhea in the following weeks after your treatment is completed

## 2022-11-01 NOTE — PROGRESS NOTES
Radiation Completion Note     Patient: Radha Duarte   YOB: 1960   Medical Record Number: [id number]    Diagnosis:  Cancer Staging   Malignant neoplasm of female breast (HCC)  Staging form: Breast, AJCC 8th Edition  - Clinical stage from 5/15/2022: Stage IV (cT3, cN2a, pM1, G3, ER-, [place]-, HER2-) - Signed by Trisha Meade MD on 5/15/2022      Prior Treatment:  - carboplatin/paclitaxel 5/16/2022-6/7/2022  - capecitabine started 7/7/2022    Implanted Devices: none    Summary: Ms. Duarte is a 62 y.o. female with metastatic right breast cancer. She presented in 11/2021 with a right chest wall mass. CT neck and chest in 2/2022 showed right axillary, retropectoral, and mediastinal lymphadenopathy. Right chest LN biopsy on 2/22/2022 showed poorly differentiated carcinoma. PET on 3/15/2022 showed a right subpectoral presumed primary with right axillary, IMN, BL supraclavicular, and mediastinal LAD. Breast MRI on 3/18/2022 showed a discontiguous mass with enhancement mainly in the superior right breast. Biopsy of the right breast on 3/22/2022 showed poorly differentiated carcinoma. She initially started on carboplatin/paclitaxel but had to discontinue due to neuropathy. Interim PET on 6/20/2022 showed no residual avidity. 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 Repeat PET on 9/19/2022 unfortunately showed avidity in the right breast, axilla, and subclavian regions. She also had significant new right arm and breast swelling and pain. She completed palliative radiation on 11/1/2022.    Treatment Site Technique Dose per fraction (cGy) Fractions Total dose (cGy) Start date End date Elapsed Days   Right breast  Gross disease SIB IMRT 267  677 93 8365  4500 10/12/2022 11/1/2022 20     Concurrent chemotherapy: none    Treatment course: Ms. Duarte tolerated treatment well. There were no significant treatment-related complications.    Disposition: I  will see her back for follow up in 1 month, or sooner if needed.    Electronically signed by Radha Srivastava MD 11/01/22 10:41 CDT

## 2022-11-02 RX ORDER — PROMETHAZINE HYDROCHLORIDE 12.5 MG/1
12.5 TABLET ORAL EVERY 6 HOURS PRN
Qty: 90 TABLET | Refills: 0 | Status: SHIPPED | OUTPATIENT
Start: 2022-11-02 | End: 2022-11-11

## 2022-11-02 NOTE — TELEPHONE ENCOUNTER
Rx Refill Note  Requested Prescriptions     Pending Prescriptions Disp Refills   • promethazine (PHENERGAN) 12.5 MG tablet 90 tablet 0     Sig: Take 1 tablet by mouth Every 6 (Six) Hours As Needed for Nausea.      Last office visit with prescribing clinician: 10/11/2022      Next office visit with prescribing clinician: 11/4/2022            Nasrin Zuniga RN  11/02/22, 16:35 CDT

## 2022-11-03 NOTE — PROGRESS NOTES
Subjective     Radha Duarte was seen in follow-up for metastatic breast cancer.  She completed palliative radiation therapy to right breast mass and regional lymph nodes.  Tolerated treatment well.  Continue to report soft tissue nodules and right breast lump.  Right upper extremity swelling is persistent and stable  No fever or chills.  Chronic pain and neuropathy has been stable.      Past Medical History, Past Surgical History, Social History, Family History have been reviewed and are without significant changes except as mentioned.        Medications:  The current medication list was reviewed in the EMR    ALLERGIES:    Allergies   Allergen Reactions   • Percocet [Oxycodone-Acetaminophen] Nausea Only       Objective      Vitals:    11/04/22 0756   BP: 117/47   Pulse: 75   Resp: 18   Temp: 97.4 °F (36.3 °C)   SpO2: 96%         Current Status 6/7/2022   ECOG score 0       Physical Exam  Vitals and nursing note reviewed. Exam conducted with a chaperone present.   Constitutional:       Appearance: Normal appearance.   Musculoskeletal:      Comments: Right breast mass is stable.  Mild redness on right chest wall, multiple soft tissue nodules in axilla, supraclavicular region and chest wall likely metastatic nodule.  Right upper extremity swelling overall stable.   Neurological:      General: No focal deficit present.      Mental Status: She is alert and oriented to person, place, and time. Mental status is at baseline.   Psychiatric:         Mood and Affect: Mood normal.         Behavior: Behavior normal.         Thought Content: Thought content normal.               RECENT LABS:Independently reviewed and summarized  Hematology WBC   Date Value Ref Range Status   10/11/2022 4.33 3.40 - 10.80 10*3/mm3 Final     RBC   Date Value Ref Range Status   10/11/2022 2.73 (L) 3.77 - 5.28 10*6/mm3 Final     Hemoglobin   Date Value Ref Range Status   10/11/2022 9.5 (L) 12.0 - 15.9 g/dL Final     Hematocrit   Date Value  Ref Range Status   10/11/2022 28.3 (L) 34.0 - 46.6 % Final     Platelets   Date Value Ref Range Status   10/11/2022 150 140 - 450 10*3/mm3 Final     WBC   Date Value Ref Range Status   11/04/2022 3.37 (L) 3.40 - 10.80 10*3/mm3 Final     RBC   Date Value Ref Range Status   11/04/2022 3.16 (L) 3.77 - 5.28 10*6/mm3 Final     Hemoglobin   Date Value Ref Range Status   11/04/2022 10.5 (L) 12.0 - 15.9 g/dL Final     Hematocrit   Date Value Ref Range Status   11/04/2022 32.1 (L) 34.0 - 46.6 % Final     MCV   Date Value Ref Range Status   11/04/2022 101.6 (H) 79.0 - 97.0 fL Final     MCH   Date Value Ref Range Status   11/04/2022 33.2 (H) 26.6 - 33.0 pg Final     MCHC   Date Value Ref Range Status   11/04/2022 32.7 31.5 - 35.7 g/dL Final     RDW   Date Value Ref Range Status   11/04/2022 14.4 12.3 - 15.4 % Final     RDW-SD   Date Value Ref Range Status   11/04/2022 53.8 37.0 - 54.0 fl Final     MPV   Date Value Ref Range Status   11/04/2022 9.4 6.0 - 12.0 fL Final     Platelets   Date Value Ref Range Status   11/04/2022 129 (L) 140 - 450 10*3/mm3 Final     Neutrophil %   Date Value Ref Range Status   11/04/2022 76.2 (H) 42.7 - 76.0 % Final     Lymphocyte %   Date Value Ref Range Status   11/04/2022 9.5 (L) 19.6 - 45.3 % Final     Monocyte %   Date Value Ref Range Status   11/04/2022 11.6 5.0 - 12.0 % Final     Eosinophil %   Date Value Ref Range Status   11/04/2022 1.8 0.3 - 6.2 % Final     Basophil %   Date Value Ref Range Status   11/04/2022 0.6 0.0 - 1.5 % Final     Immature Grans %   Date Value Ref Range Status   11/04/2022 0.3 0.0 - 0.5 % Final     Neutrophils, Absolute   Date Value Ref Range Status   11/04/2022 2.57 1.70 - 7.00 10*3/mm3 Final     Lymphocytes, Absolute   Date Value Ref Range Status   11/04/2022 0.32 (L) 0.70 - 3.10 10*3/mm3 Final     Monocytes, Absolute   Date Value Ref Range Status   11/04/2022 0.39 0.10 - 0.90 10*3/mm3 Final     Eosinophils, Absolute   Date Value Ref Range Status   11/04/2022 0.06  0.00 - 0.40 10*3/mm3 Final     Basophils, Absolute   Date Value Ref Range Status   11/04/2022 0.02 0.00 - 0.20 10*3/mm3 Final     Immature Grans, Absolute   Date Value Ref Range Status   11/04/2022 0.01 0.00 - 0.05 10*3/mm3 Final     nRBC   Date Value Ref Range Status   11/04/2022 0.0 0.0 - 0.2 /100 WBC Final       Lab Results   Component Value Date    GLUCOSE 144 (H) 11/04/2022    BUN 12 11/04/2022    CREATININE 0.66 11/04/2022    BCR 18.2 11/04/2022    K 4.0 11/04/2022    CO2 26.0 11/04/2022    CALCIUM 9.1 11/04/2022    ALBUMIN 3.70 11/04/2022    AST 21 11/04/2022    ALT 7 11/04/2022              Radha Duarte reports a pain score of 0.  Given her pain assessment as noted, treatment options were discussed and the following options were decided upon as a follow-up plan to address the patient's pain: continuation of current treatment plan for pain.    Patient screened negative for depression based on a PHQ-9 score of 0 on 11/4/2022.     Diagnosis:    (1) Metastatic breast cancer with distant LN (mediastinal) metastases   Triple negative   Unable to do perform foundation one due to limited tissue      Current therapy:   Weekly carboplatin/paclitaxel     (4/22/22- 7/7/22)      Discontinue to pain peripheral neuropathy in lower extremities.      Switch to XELODA (7//22/22)      PET scan on 9/19/22 showed progressive disease, mainly in right breast/chest wall/regional nodes.     Started on trodelvy.   D1C1: 10/4/22     Palliative RT to right breast/LN.     D1C2: 11/4/22    (2) Cancer associated pain   (3) Right upper extremity DVT   (4) Mucositis   (5) Unintentional weight loss   (6) Chemotherapy induce peripheral neuropathy     Assessment & Plan      (1) Metastatic breast cancer with distant LN (mediastinal) metastases     Chronic, stable.    Was treated with carboplatin/paclitaxel ( discontinue to neuropathy), xeloda (progressive disease).     Completed radiation therapy to right breast and regional lymph  nodes.  Tolerated treatment well without any major side effects.  We will resume her Trodelvy.  She is tolerated day 1 cycle 1 well.  Today we was on hold given she received radiation treatment.  Her labs look stable except for  Mild anemia.  Day 1 cycle 2 Trodelvy was signed on today's visit.  I will plan to see her back in 1 week with CBC, CMP  Prior to day 8 cycle 2.    2) Cancer associated pain     Chronic, stable.  She is on MS Contin 30 mg twice a day.  She will continue this    (3) Right upper extremity DVT     Chronic, stable.  She is on Eliquis.  No new concern for bleeding or thrombosis.  Continue with close monitoring.    (4) Mucositis   Chronic, stable.  As needed Magic mouthwash      (5) Unintentional weight loss     Chronic, stable.  Recommend high-calorie high-protein diet.    (6) Chemotherapy induce peripheral neuropathy     Chronic, stable.  She is on Neurontin 400 mg 3 times daily.  She will continue this           11/3/2022      CC:

## 2022-11-04 ENCOUNTER — INFUSION (OUTPATIENT)
Dept: ONCOLOGY | Facility: HOSPITAL | Age: 62
End: 2022-11-04

## 2022-11-04 ENCOUNTER — OFFICE VISIT (OUTPATIENT)
Dept: ONCOLOGY | Facility: CLINIC | Age: 62
End: 2022-11-04

## 2022-11-04 VITALS
SYSTOLIC BLOOD PRESSURE: 117 MMHG | WEIGHT: 122 LBS | OXYGEN SATURATION: 96 % | HEART RATE: 75 BPM | BODY MASS INDEX: 21.61 KG/M2 | RESPIRATION RATE: 18 BRPM | TEMPERATURE: 97.4 F | DIASTOLIC BLOOD PRESSURE: 47 MMHG

## 2022-11-04 DIAGNOSIS — G89.3 CANCER ASSOCIATED PAIN: ICD-10-CM

## 2022-11-04 DIAGNOSIS — C50.919 MALIGNANT NEOPLASM OF FEMALE BREAST, UNSPECIFIED ESTROGEN RECEPTOR STATUS, UNSPECIFIED LATERALITY, UNSPECIFIED SITE OF BREAST: Primary | ICD-10-CM

## 2022-11-04 LAB
ALBUMIN SERPL-MCNC: 3.7 G/DL (ref 3.5–5.2)
ALBUMIN/GLOB SERPL: 1.4 G/DL
ALP SERPL-CCNC: 73 U/L (ref 39–117)
ALT SERPL W P-5'-P-CCNC: 7 U/L (ref 1–33)
ANION GAP SERPL CALCULATED.3IONS-SCNC: 9 MMOL/L (ref 5–15)
AST SERPL-CCNC: 21 U/L (ref 1–32)
BASOPHILS # BLD AUTO: 0.02 10*3/MM3 (ref 0–0.2)
BASOPHILS NFR BLD AUTO: 0.6 % (ref 0–1.5)
BILIRUB SERPL-MCNC: 0.4 MG/DL (ref 0–1.2)
BUN SERPL-MCNC: 12 MG/DL (ref 8–23)
BUN/CREAT SERPL: 18.2 (ref 7–25)
CALCIUM SPEC-SCNC: 9.1 MG/DL (ref 8.6–10.5)
CHLORIDE SERPL-SCNC: 108 MMOL/L (ref 98–107)
CO2 SERPL-SCNC: 26 MMOL/L (ref 22–29)
CREAT SERPL-MCNC: 0.66 MG/DL (ref 0.57–1)
DEPRECATED RDW RBC AUTO: 53.8 FL (ref 37–54)
EGFRCR SERPLBLD CKD-EPI 2021: 98.1 ML/MIN/1.73
EOSINOPHIL # BLD AUTO: 0.06 10*3/MM3 (ref 0–0.4)
EOSINOPHIL NFR BLD AUTO: 1.8 % (ref 0.3–6.2)
ERYTHROCYTE [DISTWIDTH] IN BLOOD BY AUTOMATED COUNT: 14.4 % (ref 12.3–15.4)
GLOBULIN UR ELPH-MCNC: 2.7 GM/DL
GLUCOSE SERPL-MCNC: 144 MG/DL (ref 65–99)
HCT VFR BLD AUTO: 32.1 % (ref 34–46.6)
HGB BLD-MCNC: 10.5 G/DL (ref 12–15.9)
HOLD SPECIMEN: NORMAL
IMM GRANULOCYTES # BLD AUTO: 0.01 10*3/MM3 (ref 0–0.05)
IMM GRANULOCYTES NFR BLD AUTO: 0.3 % (ref 0–0.5)
LYMPHOCYTES # BLD AUTO: 0.32 10*3/MM3 (ref 0.7–3.1)
LYMPHOCYTES NFR BLD AUTO: 9.5 % (ref 19.6–45.3)
MCH RBC QN AUTO: 33.2 PG (ref 26.6–33)
MCHC RBC AUTO-ENTMCNC: 32.7 G/DL (ref 31.5–35.7)
MCV RBC AUTO: 101.6 FL (ref 79–97)
MONOCYTES # BLD AUTO: 0.39 10*3/MM3 (ref 0.1–0.9)
MONOCYTES NFR BLD AUTO: 11.6 % (ref 5–12)
NEUTROPHILS NFR BLD AUTO: 2.57 10*3/MM3 (ref 1.7–7)
NEUTROPHILS NFR BLD AUTO: 76.2 % (ref 42.7–76)
NRBC BLD AUTO-RTO: 0 /100 WBC (ref 0–0.2)
PLATELET # BLD AUTO: 129 10*3/MM3 (ref 140–450)
PMV BLD AUTO: 9.4 FL (ref 6–12)
POTASSIUM SERPL-SCNC: 4 MMOL/L (ref 3.5–5.2)
PROT SERPL-MCNC: 6.4 G/DL (ref 6–8.5)
RBC # BLD AUTO: 3.16 10*6/MM3 (ref 3.77–5.28)
SODIUM SERPL-SCNC: 143 MMOL/L (ref 136–145)
WBC NRBC COR # BLD: 3.37 10*3/MM3 (ref 3.4–10.8)

## 2022-11-04 PROCEDURE — 25010000002 DEXAMETHASONE SODIUM PHOSPHATE 100 MG/10ML SOLUTION: Performed by: INTERNAL MEDICINE

## 2022-11-04 PROCEDURE — 80053 COMPREHEN METABOLIC PANEL: CPT

## 2022-11-04 PROCEDURE — 99214 OFFICE O/P EST MOD 30 MIN: CPT | Performed by: INTERNAL MEDICINE

## 2022-11-04 PROCEDURE — 25010000002 FOSAPREPITANT PER 1 MG: Performed by: INTERNAL MEDICINE

## 2022-11-04 PROCEDURE — 96375 TX/PRO/DX INJ NEW DRUG ADDON: CPT | Performed by: INTERNAL MEDICINE

## 2022-11-04 PROCEDURE — 25010000002 SACITUZUMAB GOVITECAN-HZIY 180 MG RECONSTITUTED SOLUTION 1 EACH VIAL: Performed by: INTERNAL MEDICINE

## 2022-11-04 PROCEDURE — 96367 TX/PROPH/DG ADDL SEQ IV INF: CPT | Performed by: INTERNAL MEDICINE

## 2022-11-04 PROCEDURE — 25010000002 HEPARIN LOCK FLUSH PER 10 UNITS: Performed by: INTERNAL MEDICINE

## 2022-11-04 PROCEDURE — 25010000002 PALONOSETRON PER 25 MCG: Performed by: INTERNAL MEDICINE

## 2022-11-04 PROCEDURE — 85025 COMPLETE CBC W/AUTO DIFF WBC: CPT

## 2022-11-04 PROCEDURE — 36415 COLL VENOUS BLD VENIPUNCTURE: CPT

## 2022-11-04 PROCEDURE — 96413 CHEMO IV INFUSION 1 HR: CPT | Performed by: INTERNAL MEDICINE

## 2022-11-04 PROCEDURE — 25010000002 DIPHENHYDRAMINE PER 50 MG: Performed by: INTERNAL MEDICINE

## 2022-11-04 RX ORDER — FAMOTIDINE 10 MG/ML
20 INJECTION, SOLUTION INTRAVENOUS ONCE
Status: COMPLETED | OUTPATIENT
Start: 2022-11-04 | End: 2022-11-04

## 2022-11-04 RX ORDER — HEPARIN SODIUM (PORCINE) LOCK FLUSH IV SOLN 100 UNIT/ML 100 UNIT/ML
500 SOLUTION INTRAVENOUS AS NEEDED
Status: CANCELLED | OUTPATIENT
Start: 2022-11-11

## 2022-11-04 RX ORDER — PALONOSETRON 0.05 MG/ML
0.25 INJECTION, SOLUTION INTRAVENOUS ONCE
Status: COMPLETED | OUTPATIENT
Start: 2022-11-04 | End: 2022-11-04

## 2022-11-04 RX ORDER — HEPARIN SODIUM (PORCINE) LOCK FLUSH IV SOLN 100 UNIT/ML 100 UNIT/ML
500 SOLUTION INTRAVENOUS AS NEEDED
Status: DISCONTINUED | OUTPATIENT
Start: 2022-11-04 | End: 2022-11-04 | Stop reason: HOSPADM

## 2022-11-04 RX ORDER — ACETAMINOPHEN 325 MG/1
650 TABLET ORAL ONCE
Status: CANCELLED | OUTPATIENT
Start: 2022-11-04

## 2022-11-04 RX ORDER — FAMOTIDINE 10 MG/ML
20 INJECTION, SOLUTION INTRAVENOUS ONCE
Status: CANCELLED | OUTPATIENT
Start: 2022-11-04

## 2022-11-04 RX ORDER — SODIUM CHLORIDE 9 MG/ML
250 INJECTION, SOLUTION INTRAVENOUS ONCE
Status: CANCELLED | OUTPATIENT
Start: 2022-11-04

## 2022-11-04 RX ORDER — ATROPINE SULFATE 1 MG/ML
0.25 INJECTION, SOLUTION INTRAMUSCULAR; INTRAVENOUS; SUBCUTANEOUS
Status: DISCONTINUED | OUTPATIENT
Start: 2022-11-04 | End: 2022-11-04 | Stop reason: HOSPADM

## 2022-11-04 RX ORDER — FAMOTIDINE 10 MG/ML
20 INJECTION, SOLUTION INTRAVENOUS AS NEEDED
Status: CANCELLED | OUTPATIENT
Start: 2022-11-04

## 2022-11-04 RX ORDER — SODIUM CHLORIDE 0.9 % (FLUSH) 0.9 %
10 SYRINGE (ML) INJECTION AS NEEDED
Status: CANCELLED | OUTPATIENT
Start: 2022-11-11

## 2022-11-04 RX ORDER — OLANZAPINE 5 MG/1
5 TABLET ORAL ONCE
Status: COMPLETED | OUTPATIENT
Start: 2022-11-04 | End: 2022-11-04

## 2022-11-04 RX ORDER — ATROPINE SULFATE 1 MG/ML
0.25 INJECTION, SOLUTION INTRAMUSCULAR; INTRAVENOUS; SUBCUTANEOUS
Status: CANCELLED | OUTPATIENT
Start: 2022-11-04

## 2022-11-04 RX ORDER — SODIUM CHLORIDE 0.9 % (FLUSH) 0.9 %
10 SYRINGE (ML) INJECTION AS NEEDED
Status: DISCONTINUED | OUTPATIENT
Start: 2022-11-04 | End: 2022-11-04 | Stop reason: HOSPADM

## 2022-11-04 RX ORDER — OLANZAPINE 5 MG/1
5 TABLET ORAL ONCE
Status: CANCELLED | OUTPATIENT
Start: 2022-11-04 | End: 2022-11-04

## 2022-11-04 RX ORDER — DIPHENHYDRAMINE HYDROCHLORIDE 50 MG/ML
50 INJECTION INTRAMUSCULAR; INTRAVENOUS AS NEEDED
Status: CANCELLED | OUTPATIENT
Start: 2022-11-04

## 2022-11-04 RX ORDER — ACETAMINOPHEN 325 MG/1
650 TABLET ORAL ONCE
Status: COMPLETED | OUTPATIENT
Start: 2022-11-04 | End: 2022-11-04

## 2022-11-04 RX ORDER — PALONOSETRON 0.05 MG/ML
0.25 INJECTION, SOLUTION INTRAVENOUS ONCE
Status: CANCELLED | OUTPATIENT
Start: 2022-11-04

## 2022-11-04 RX ORDER — SODIUM CHLORIDE 9 MG/ML
250 INJECTION, SOLUTION INTRAVENOUS ONCE
Status: COMPLETED | OUTPATIENT
Start: 2022-11-04 | End: 2022-11-04

## 2022-11-04 RX ADMIN — FAMOTIDINE 20 MG: 10 INJECTION INTRAVENOUS at 08:34

## 2022-11-04 RX ADMIN — HEPARIN 500 UNITS: 100 SYRINGE at 12:07

## 2022-11-04 RX ADMIN — Medication 10 ML: at 12:07

## 2022-11-04 RX ADMIN — SACITUZUMAB GOVITECAN 590 MG: 180 POWDER, FOR SOLUTION INTRAVENOUS at 10:46

## 2022-11-04 RX ADMIN — SODIUM CHLORIDE 250 ML: 9 INJECTION, SOLUTION INTRAVENOUS at 08:33

## 2022-11-04 RX ADMIN — PALONOSETRON 0.25 MG: 0.05 INJECTION, SOLUTION INTRAVENOUS at 08:40

## 2022-11-04 RX ADMIN — OLANZAPINE 5 MG: 5 TABLET, FILM COATED ORAL at 08:34

## 2022-11-04 RX ADMIN — DIPHENHYDRAMINE HYDROCHLORIDE 25 MG: 50 INJECTION, SOLUTION INTRAMUSCULAR; INTRAVENOUS at 09:41

## 2022-11-04 RX ADMIN — DEXAMETHASONE SODIUM PHOSPHATE 12 MG: 10 INJECTION, SOLUTION INTRAMUSCULAR; INTRAVENOUS at 10:08

## 2022-11-04 RX ADMIN — ACETAMINOPHEN 650 MG: 325 TABLET, FILM COATED ORAL at 08:34

## 2022-11-04 RX ADMIN — FOSAPREPITANT 100 ML: 150 INJECTION, POWDER, LYOPHILIZED, FOR SOLUTION INTRAVENOUS at 08:52

## 2022-11-10 DIAGNOSIS — G62.0 CHEMOTHERAPY-INDUCED PERIPHERAL NEUROPATHY: ICD-10-CM

## 2022-11-10 DIAGNOSIS — T45.1X5A CHEMOTHERAPY-INDUCED PERIPHERAL NEUROPATHY: ICD-10-CM

## 2022-11-11 ENCOUNTER — OFFICE VISIT (OUTPATIENT)
Dept: ONCOLOGY | Facility: CLINIC | Age: 62
End: 2022-11-11

## 2022-11-11 ENCOUNTER — INFUSION (OUTPATIENT)
Dept: ONCOLOGY | Facility: HOSPITAL | Age: 62
End: 2022-11-11

## 2022-11-11 VITALS
SYSTOLIC BLOOD PRESSURE: 121 MMHG | DIASTOLIC BLOOD PRESSURE: 57 MMHG | HEART RATE: 76 BPM | TEMPERATURE: 96.3 F | OXYGEN SATURATION: 98 % | RESPIRATION RATE: 18 BRPM

## 2022-11-11 DIAGNOSIS — C50.919 MALIGNANT NEOPLASM OF FEMALE BREAST, UNSPECIFIED ESTROGEN RECEPTOR STATUS, UNSPECIFIED LATERALITY, UNSPECIFIED SITE OF BREAST: Primary | ICD-10-CM

## 2022-11-11 DIAGNOSIS — G62.0 CHEMOTHERAPY-INDUCED PERIPHERAL NEUROPATHY: ICD-10-CM

## 2022-11-11 DIAGNOSIS — G89.3 CANCER ASSOCIATED PAIN: ICD-10-CM

## 2022-11-11 DIAGNOSIS — T45.1X5A CHEMOTHERAPY-INDUCED PERIPHERAL NEUROPATHY: ICD-10-CM

## 2022-11-11 DIAGNOSIS — I82.A12 ACUTE DEEP VEIN THROMBOSIS (DVT) OF AXILLARY VEIN OF LEFT UPPER EXTREMITY: ICD-10-CM

## 2022-11-11 LAB
ALBUMIN SERPL-MCNC: 3.9 G/DL (ref 3.5–5.2)
ALBUMIN/GLOB SERPL: 1.8 G/DL
ALP SERPL-CCNC: 65 U/L (ref 39–117)
ALT SERPL W P-5'-P-CCNC: 9 U/L (ref 1–33)
ANION GAP SERPL CALCULATED.3IONS-SCNC: 10 MMOL/L (ref 5–15)
AST SERPL-CCNC: 16 U/L (ref 1–32)
BASOPHILS # BLD AUTO: 0.02 10*3/MM3 (ref 0–0.2)
BASOPHILS NFR BLD AUTO: 0.7 % (ref 0–1.5)
BILIRUB SERPL-MCNC: 0.3 MG/DL (ref 0–1.2)
BUN SERPL-MCNC: 14 MG/DL (ref 8–23)
BUN/CREAT SERPL: 24.6 (ref 7–25)
CALCIUM SPEC-SCNC: 9.3 MG/DL (ref 8.6–10.5)
CHLORIDE SERPL-SCNC: 107 MMOL/L (ref 98–107)
CO2 SERPL-SCNC: 26 MMOL/L (ref 22–29)
CREAT SERPL-MCNC: 0.57 MG/DL (ref 0.57–1)
DEPRECATED RDW RBC AUTO: 52.4 FL (ref 37–54)
EGFRCR SERPLBLD CKD-EPI 2021: 101.6 ML/MIN/1.73
EOSINOPHIL # BLD AUTO: 0.05 10*3/MM3 (ref 0–0.4)
EOSINOPHIL NFR BLD AUTO: 1.7 % (ref 0.3–6.2)
ERYTHROCYTE [DISTWIDTH] IN BLOOD BY AUTOMATED COUNT: 14.5 % (ref 12.3–15.4)
GLOBULIN UR ELPH-MCNC: 2.2 GM/DL
GLUCOSE SERPL-MCNC: 110 MG/DL (ref 65–99)
HCT VFR BLD AUTO: 30.7 % (ref 34–46.6)
HGB BLD-MCNC: 9.9 G/DL (ref 12–15.9)
HOLD SPECIMEN: NORMAL
IMM GRANULOCYTES # BLD AUTO: 0.05 10*3/MM3 (ref 0–0.05)
IMM GRANULOCYTES NFR BLD AUTO: 1.7 % (ref 0–0.5)
LYMPHOCYTES # BLD AUTO: 0.3 10*3/MM3 (ref 0.7–3.1)
LYMPHOCYTES NFR BLD AUTO: 10.2 % (ref 19.6–45.3)
MCH RBC QN AUTO: 32.2 PG (ref 26.6–33)
MCHC RBC AUTO-ENTMCNC: 32.2 G/DL (ref 31.5–35.7)
MCV RBC AUTO: 100 FL (ref 79–97)
MONOCYTES # BLD AUTO: 0.34 10*3/MM3 (ref 0.1–0.9)
MONOCYTES NFR BLD AUTO: 11.5 % (ref 5–12)
NEUTROPHILS NFR BLD AUTO: 2.19 10*3/MM3 (ref 1.7–7)
NEUTROPHILS NFR BLD AUTO: 74.2 % (ref 42.7–76)
NRBC BLD AUTO-RTO: 0 /100 WBC (ref 0–0.2)
PLATELET # BLD AUTO: 118 10*3/MM3 (ref 140–450)
PMV BLD AUTO: 10.1 FL (ref 6–12)
POTASSIUM SERPL-SCNC: 3.8 MMOL/L (ref 3.5–5.2)
PROT SERPL-MCNC: 6.1 G/DL (ref 6–8.5)
RBC # BLD AUTO: 3.07 10*6/MM3 (ref 3.77–5.28)
SODIUM SERPL-SCNC: 143 MMOL/L (ref 136–145)
WBC NRBC COR # BLD: 2.95 10*3/MM3 (ref 3.4–10.8)

## 2022-11-11 PROCEDURE — 99214 OFFICE O/P EST MOD 30 MIN: CPT | Performed by: INTERNAL MEDICINE

## 2022-11-11 PROCEDURE — 25010000002 DIPHENHYDRAMINE PER 50 MG: Performed by: INTERNAL MEDICINE

## 2022-11-11 PROCEDURE — 25010000002 DEXAMETHASONE SODIUM PHOSPHATE 100 MG/10ML SOLUTION: Performed by: INTERNAL MEDICINE

## 2022-11-11 PROCEDURE — 85025 COMPLETE CBC W/AUTO DIFF WBC: CPT

## 2022-11-11 PROCEDURE — 25010000002 SACITUZUMAB GOVITECAN-HZIY 180 MG RECONSTITUTED SOLUTION 1 EACH VIAL: Performed by: INTERNAL MEDICINE

## 2022-11-11 PROCEDURE — 96367 TX/PROPH/DG ADDL SEQ IV INF: CPT | Performed by: INTERNAL MEDICINE

## 2022-11-11 PROCEDURE — 25010000002 FOSAPREPITANT PER 1 MG: Performed by: INTERNAL MEDICINE

## 2022-11-11 PROCEDURE — 25010000002 HEPARIN LOCK FLUSH PER 10 UNITS: Performed by: INTERNAL MEDICINE

## 2022-11-11 PROCEDURE — 96375 TX/PRO/DX INJ NEW DRUG ADDON: CPT | Performed by: INTERNAL MEDICINE

## 2022-11-11 PROCEDURE — 80053 COMPREHEN METABOLIC PANEL: CPT

## 2022-11-11 PROCEDURE — 25010000002 PALONOSETRON PER 25 MCG: Performed by: INTERNAL MEDICINE

## 2022-11-11 PROCEDURE — 96413 CHEMO IV INFUSION 1 HR: CPT | Performed by: INTERNAL MEDICINE

## 2022-11-11 PROCEDURE — 36415 COLL VENOUS BLD VENIPUNCTURE: CPT

## 2022-11-11 RX ORDER — ATROPINE SULFATE 1 MG/ML
0.25 INJECTION, SOLUTION INTRAMUSCULAR; INTRAVENOUS; SUBCUTANEOUS
Status: DISCONTINUED | OUTPATIENT
Start: 2022-11-11 | End: 2022-11-11 | Stop reason: HOSPADM

## 2022-11-11 RX ORDER — PALONOSETRON 0.05 MG/ML
0.25 INJECTION, SOLUTION INTRAVENOUS ONCE
Status: COMPLETED | OUTPATIENT
Start: 2022-11-11 | End: 2022-11-11

## 2022-11-11 RX ORDER — SODIUM CHLORIDE 0.9 % (FLUSH) 0.9 %
10 SYRINGE (ML) INJECTION AS NEEDED
Status: DISCONTINUED | OUTPATIENT
Start: 2022-11-11 | End: 2022-11-11 | Stop reason: HOSPADM

## 2022-11-11 RX ORDER — GABAPENTIN 400 MG/1
CAPSULE ORAL
Qty: 90 CAPSULE | Refills: 0 | Status: SHIPPED | OUTPATIENT
Start: 2022-11-11 | End: 2022-12-20

## 2022-11-11 RX ORDER — ACETAMINOPHEN 325 MG/1
650 TABLET ORAL ONCE
Status: CANCELLED | OUTPATIENT
Start: 2022-11-11

## 2022-11-11 RX ORDER — OLANZAPINE 5 MG/1
5 TABLET ORAL ONCE
Status: CANCELLED | OUTPATIENT
Start: 2022-11-11 | End: 2022-11-11

## 2022-11-11 RX ORDER — SODIUM CHLORIDE 9 MG/ML
250 INJECTION, SOLUTION INTRAVENOUS ONCE
Status: COMPLETED | OUTPATIENT
Start: 2022-11-11 | End: 2022-11-11

## 2022-11-11 RX ORDER — SODIUM CHLORIDE 0.9 % (FLUSH) 0.9 %
10 SYRINGE (ML) INJECTION AS NEEDED
Status: CANCELLED | OUTPATIENT
Start: 2022-11-11

## 2022-11-11 RX ORDER — DIPHENHYDRAMINE HYDROCHLORIDE 50 MG/ML
50 INJECTION INTRAMUSCULAR; INTRAVENOUS AS NEEDED
Status: CANCELLED | OUTPATIENT
Start: 2022-11-11

## 2022-11-11 RX ORDER — FAMOTIDINE 10 MG/ML
20 INJECTION, SOLUTION INTRAVENOUS ONCE
Status: CANCELLED | OUTPATIENT
Start: 2022-11-11

## 2022-11-11 RX ORDER — OLANZAPINE 5 MG/1
5 TABLET ORAL ONCE
Status: COMPLETED | OUTPATIENT
Start: 2022-11-11 | End: 2022-11-11

## 2022-11-11 RX ORDER — FAMOTIDINE 10 MG/ML
20 INJECTION, SOLUTION INTRAVENOUS ONCE
Status: COMPLETED | OUTPATIENT
Start: 2022-11-11 | End: 2022-11-11

## 2022-11-11 RX ORDER — FAMOTIDINE 10 MG/ML
20 INJECTION, SOLUTION INTRAVENOUS AS NEEDED
Status: CANCELLED | OUTPATIENT
Start: 2022-11-11

## 2022-11-11 RX ORDER — HEPARIN SODIUM (PORCINE) LOCK FLUSH IV SOLN 100 UNIT/ML 100 UNIT/ML
500 SOLUTION INTRAVENOUS AS NEEDED
Status: DISCONTINUED | OUTPATIENT
Start: 2022-11-11 | End: 2022-11-11 | Stop reason: HOSPADM

## 2022-11-11 RX ORDER — HEPARIN SODIUM (PORCINE) LOCK FLUSH IV SOLN 100 UNIT/ML 100 UNIT/ML
500 SOLUTION INTRAVENOUS AS NEEDED
Status: CANCELLED | OUTPATIENT
Start: 2022-12-02

## 2022-11-11 RX ORDER — ACETAMINOPHEN 325 MG/1
650 TABLET ORAL ONCE
Status: COMPLETED | OUTPATIENT
Start: 2022-11-11 | End: 2022-11-11

## 2022-11-11 RX ORDER — ATROPINE SULFATE 1 MG/ML
0.25 INJECTION, SOLUTION INTRAMUSCULAR; INTRAVENOUS; SUBCUTANEOUS
Status: CANCELLED | OUTPATIENT
Start: 2022-11-11

## 2022-11-11 RX ORDER — SODIUM CHLORIDE 9 MG/ML
250 INJECTION, SOLUTION INTRAVENOUS ONCE
Status: CANCELLED | OUTPATIENT
Start: 2022-11-11

## 2022-11-11 RX ORDER — PROMETHAZINE HYDROCHLORIDE 12.5 MG/1
TABLET ORAL
Qty: 90 TABLET | Refills: 14 | Status: SHIPPED | OUTPATIENT
Start: 2022-11-11 | End: 2023-01-16 | Stop reason: SDUPTHER

## 2022-11-11 RX ORDER — PALONOSETRON 0.05 MG/ML
0.25 INJECTION, SOLUTION INTRAVENOUS ONCE
Status: CANCELLED | OUTPATIENT
Start: 2022-11-11

## 2022-11-11 RX ADMIN — HEPARIN 500 UNITS: 100 SYRINGE at 12:15

## 2022-11-11 RX ADMIN — ACETAMINOPHEN 650 MG: 325 TABLET, FILM COATED ORAL at 08:54

## 2022-11-11 RX ADMIN — SACITUZUMAB GOVITECAN 553 MG: 180 POWDER, FOR SOLUTION INTRAVENOUS at 11:02

## 2022-11-11 RX ADMIN — Medication 10 ML: at 12:15

## 2022-11-11 RX ADMIN — DIPHENHYDRAMINE HYDROCHLORIDE 25 MG: 50 INJECTION, SOLUTION INTRAMUSCULAR; INTRAVENOUS at 09:49

## 2022-11-11 RX ADMIN — SODIUM CHLORIDE 250 ML: 9 INJECTION, SOLUTION INTRAVENOUS at 08:51

## 2022-11-11 RX ADMIN — OLANZAPINE 5 MG: 5 TABLET, FILM COATED ORAL at 08:54

## 2022-11-11 RX ADMIN — FAMOTIDINE 20 MG: 10 INJECTION, SOLUTION INTRAVENOUS at 09:00

## 2022-11-11 RX ADMIN — DEXAMETHASONE SODIUM PHOSPHATE 12 MG: 10 INJECTION, SOLUTION INTRAMUSCULAR; INTRAVENOUS at 10:23

## 2022-11-11 RX ADMIN — PALONOSETRON 0.25 MG: 0.05 INJECTION, SOLUTION INTRAVENOUS at 08:54

## 2022-11-11 RX ADMIN — FOSAPREPITANT 100 ML: 150 INJECTION, POWDER, LYOPHILIZED, FOR SOLUTION INTRAVENOUS at 09:06

## 2022-11-11 NOTE — PROGRESS NOTES
Subjective     Radha Duarte was seen in follow-up for metastatic breast cancer, peripheral neuropathy, and DVT.  She has been stable since last visit.  Tolerating Trodelvy well without any major side effects.  Feels well.  Eating well.  Denies any nausea or emesis.  No mouth sores.  No fever or chills.  Her overall pain has been stable.    Past Medical History, Past Surgical History, Social History, Family History have been reviewed and are without significant changes except as mentioned.        Medications:  The current medication list was reviewed in the EMR    ALLERGIES:    Allergies   Allergen Reactions   • Percocet [Oxycodone-Acetaminophen] Nausea Only       Objective      Vitals:    11/11/22 0802   BP: 121/57   Pulse: 76   Resp: 18   Temp: 96.3 °F (35.7 °C)   SpO2: 98%         Current Status 11/4/2022   ECOG score 0       Physical Exam  Vitals and nursing note reviewed.   Constitutional:       Appearance: Normal appearance.   Musculoskeletal:      Comments: Right upper extremity swelling has been overall stable.  Right breast lump overall stable.   Neurological:      General: No focal deficit present.      Mental Status: She is alert and oriented to person, place, and time. Mental status is at baseline.   Psychiatric:         Mood and Affect: Mood normal.         Behavior: Behavior normal.         Thought Content: Thought content normal.               RECENT LABS:Independently reviewed and summarized  Hematology WBC   Date Value Ref Range Status   11/11/2022 2.95 (L) 3.40 - 10.80 10*3/mm3 Final     RBC   Date Value Ref Range Status   11/11/2022 3.07 (L) 3.77 - 5.28 10*6/mm3 Final     Hemoglobin   Date Value Ref Range Status   11/11/2022 9.9 (L) 12.0 - 15.9 g/dL Final     Hematocrit   Date Value Ref Range Status   11/11/2022 30.7 (L) 34.0 - 46.6 % Final     Platelets   Date Value Ref Range Status   11/11/2022 118 (L) 140 - 450 10*3/mm3 Final       Lab Results   Component Value Date    GLUCOSE 110 (H)  11/11/2022    BUN 14 11/11/2022    CREATININE 0.57 11/11/2022    BCR 24.6 11/11/2022    K 3.8 11/11/2022    CO2 26.0 11/11/2022    CALCIUM 9.3 11/11/2022    ALBUMIN 3.90 11/11/2022    AST 16 11/11/2022    ALT 9 11/11/2022            Radha Duarte reports a pain score of 0.  Given her pain assessment as noted, treatment options were discussed and the following options were decided upon as a follow-up plan to address the patient's pain: continuation of current treatment plan for pain.    Patient screened negative for depression based on a PHQ-9 score of 0 on 11/11/2022.     Advance Care Planning   ACP discussion was declined by the patient. Patient has an advance directive in EMR which is still valid.            Diagnosis:    (1) Metastatic breast cancer with distant LN (mediastinal) metastases   Triple negative   Unable to do perform foundation one due to limited tissue      Current therapy:   Weekly carboplatin/paclitaxel     (4/22/22- 7/7/22)      Discontinue to pain peripheral neuropathy in lower extremities.      Switch to XELODA (7//22/22)      PET scan on 9/19/22 showed progressive disease, mainly in right breast/chest wall/regional nodes.      Started on trodelvy.   D1C1: 10/4/22      Palliative RT to right breast/LN.      D1C2: 11/4/22  D8C2: 11/11/22     (2) Cancer associated pain   (3) Right upper extremity DVT   (4) Mucositis   (5) Unintentional weight loss   (6) Chemotherapy induce peripheral neuropathy        Assessment & Plan       (1) Metastatic breast cancer with distant LN (mediastinal) metastases     Chronic, stable    Was treated with carboplatin/paclitaxel ( discontinue to neuropathy), xeloda (progressive disease).     She is currently on Trodelvy.      Labs look stable.  She does have chemotherapy-induced leukopenia and anemia however absolute neutrophil count is over 2000.  Anemia is only mild with hemoglobin of 9.9.  Platelet count is 119,000.  Other labs look stable.    She is feeling  well.    Day 8 cycle 2 Dayanara was signed on today's visit.  I will plan to see her back in 3 weeks due to Thanksgiving holiday with CBC, CMP prior to day 1 cycle 3      (2) Cancer associated pain     Chronic, stable.  She is on MS Contin 30 mg twice a day.  She will continue this.    (3) Right upper extremity DVT     Chronic, stable.  She is on Eliquis.  No new concern for bleeding or thrombosis.  Continue with close monitoring    (4) Mucositis     Chronic, stable.  As needed Magic mouthwash.    (5) Unintentional weight loss     Chronic, stable.  Recommend high-calorie high-protein diet    (6) Chemotherapy induce peripheral neuropathy     Chronic, stable.  She is on Neurontin 400 mg 3 times daily.  She will continue this.  New prescription of Neurontin sent to the pharmacy.    11/11/2022      CC:

## 2022-11-11 NOTE — TELEPHONE ENCOUNTER
Rx Refill Note  Requested Prescriptions     Pending Prescriptions Disp Refills   • promethazine (PHENERGAN) 12.5 MG tablet [Pharmacy Med Name: PROMETHAZINE HCL TABS 12.5MG] 90 tablet 14     Sig: TAKE 1 TABLET EVERY 6 HOURS AS NEEDED FOR NAUSEA   • gabapentin (NEURONTIN) 400 MG capsule [Pharmacy Med Name: GABAPENTIN CAPS 400MG] 90 capsule 0     Sig: TAKE 1 CAPSULE THREE TIMES A DAY      Last office visit with prescribing clinician: 11/4/2022      Next office visit with prescribing clinician: 11/11/2022            Chaparro Campos Rep  11/11/22, 08:11 CST

## 2022-11-17 DIAGNOSIS — G89.3 CANCER ASSOCIATED PAIN: ICD-10-CM

## 2022-11-17 RX ORDER — MORPHINE SULFATE 15 MG/1
30 TABLET, FILM COATED, EXTENDED RELEASE ORAL 2 TIMES DAILY
Qty: 90 TABLET | Refills: 0 | Status: SHIPPED | OUTPATIENT
Start: 2022-11-17 | End: 2022-12-19 | Stop reason: SDUPTHER

## 2022-11-17 RX ORDER — MORPHINE SULFATE 15 MG/1
30 TABLET, FILM COATED, EXTENDED RELEASE ORAL 2 TIMES DAILY
Qty: 90 TABLET | Refills: 0 | Status: SHIPPED | OUTPATIENT
Start: 2022-11-17 | End: 2022-11-17 | Stop reason: SDUPTHER

## 2022-11-17 NOTE — TELEPHONE ENCOUNTER
Rx Refill Note  Requested Prescriptions     Pending Prescriptions Disp Refills   • Morphine (MS CONTIN) 15 MG 12 hr tablet 90 tablet 0     Sig: Take 2 tablets by mouth 2 (Two) Times a Day.      Last office visit with prescribing clinician: 11/11/2022      Next office visit with prescribing clinician: 12/2/2022            Nasrin Zuniga RN  11/17/22, 16:13 CST

## 2022-11-17 NOTE — TELEPHONE ENCOUNTER
Rx Refill Note  Requested Prescriptions     Pending Prescriptions Disp Refills   • Morphine (MS CONTIN) 15 MG 12 hr tablet 90 tablet 0     Sig: Take 2 tablets by mouth 2 (Two) Times a Day.      Last office visit with prescribing clinician: 11/11/2022      Next office visit with prescribing clinician: 12/2/2022            Migdalia Arnold  11/17/22, 13:22 CST

## 2022-11-17 NOTE — TELEPHONE ENCOUNTER
Incoming Refill Request      Medication requested (name and dose): Morphine    Pharmacy where request should be sent: [facility]    Additional details provided by patient: takes 9-12 days for processing and shipping.  Pt has 14 days.    Best call back number: [phone]    Does the patient have less than a 3 day supply:  [] Yes  [x] No    Breonna Khan  11/17/22, 10:29 CST

## 2022-11-17 NOTE — TELEPHONE ENCOUNTER
Patient called needing her Morphine (MS CONTIN) 15 MG 12 hr tablet [[id number]] (Order [id number])    Sent to [facility] - [place], [place] - [address] [place] [address] - [phone]  - [phone] [address] [address]   Phone:  [phone]  Fax:  [phone]

## 2022-12-02 ENCOUNTER — OFFICE VISIT (OUTPATIENT)
Dept: ONCOLOGY | Facility: CLINIC | Age: 62
End: 2022-12-02

## 2022-12-02 ENCOUNTER — INFUSION (OUTPATIENT)
Dept: ONCOLOGY | Facility: HOSPITAL | Age: 62
End: 2022-12-02

## 2022-12-02 VITALS
DIASTOLIC BLOOD PRESSURE: 52 MMHG | WEIGHT: 122 LBS | BODY MASS INDEX: 21.61 KG/M2 | HEART RATE: 83 BPM | RESPIRATION RATE: 18 BRPM | TEMPERATURE: 97.2 F | OXYGEN SATURATION: 96 % | SYSTOLIC BLOOD PRESSURE: 86 MMHG

## 2022-12-02 DIAGNOSIS — I95.89 HYPOTENSION DUE TO HYPOVOLEMIA: ICD-10-CM

## 2022-12-02 DIAGNOSIS — T45.1X5A CHEMOTHERAPY-INDUCED PERIPHERAL NEUROPATHY: ICD-10-CM

## 2022-12-02 DIAGNOSIS — G89.3 CANCER ASSOCIATED PAIN: ICD-10-CM

## 2022-12-02 DIAGNOSIS — I82.A12 ACUTE DEEP VEIN THROMBOSIS (DVT) OF AXILLARY VEIN OF LEFT UPPER EXTREMITY: ICD-10-CM

## 2022-12-02 DIAGNOSIS — C50.919 MALIGNANT NEOPLASM OF FEMALE BREAST, UNSPECIFIED ESTROGEN RECEPTOR STATUS, UNSPECIFIED LATERALITY, UNSPECIFIED SITE OF BREAST: Primary | ICD-10-CM

## 2022-12-02 DIAGNOSIS — E86.1 HYPOTENSION DUE TO HYPOVOLEMIA: ICD-10-CM

## 2022-12-02 DIAGNOSIS — G62.0 CHEMOTHERAPY-INDUCED PERIPHERAL NEUROPATHY: ICD-10-CM

## 2022-12-02 LAB
ALBUMIN SERPL-MCNC: 3.6 G/DL (ref 3.5–5.2)
ALBUMIN/GLOB SERPL: 1.4 G/DL
ALP SERPL-CCNC: 78 U/L (ref 39–117)
ALT SERPL W P-5'-P-CCNC: 7 U/L (ref 1–33)
ANION GAP SERPL CALCULATED.3IONS-SCNC: 9 MMOL/L (ref 5–15)
AST SERPL-CCNC: 15 U/L (ref 1–32)
BASOPHILS # BLD AUTO: 0.02 10*3/MM3 (ref 0–0.2)
BASOPHILS NFR BLD AUTO: 0.8 % (ref 0–1.5)
BILIRUB SERPL-MCNC: 0.3 MG/DL (ref 0–1.2)
BUN SERPL-MCNC: 10 MG/DL (ref 8–23)
BUN/CREAT SERPL: 15.6 (ref 7–25)
CALCIUM SPEC-SCNC: 8.8 MG/DL (ref 8.6–10.5)
CHLORIDE SERPL-SCNC: 106 MMOL/L (ref 98–107)
CO2 SERPL-SCNC: 25 MMOL/L (ref 22–29)
CREAT SERPL-MCNC: 0.64 MG/DL (ref 0.57–1)
DEPRECATED RDW RBC AUTO: 52.1 FL (ref 37–54)
EGFRCR SERPLBLD CKD-EPI 2021: 98.8 ML/MIN/1.73
EOSINOPHIL # BLD AUTO: 0.07 10*3/MM3 (ref 0–0.4)
EOSINOPHIL NFR BLD AUTO: 2.7 % (ref 0.3–6.2)
ERYTHROCYTE [DISTWIDTH] IN BLOOD BY AUTOMATED COUNT: 14.6 % (ref 12.3–15.4)
GLOBULIN UR ELPH-MCNC: 2.5 GM/DL
GLUCOSE SERPL-MCNC: 91 MG/DL (ref 65–99)
HCT VFR BLD AUTO: 30.4 % (ref 34–46.6)
HGB BLD-MCNC: 9.9 G/DL (ref 12–15.9)
IMM GRANULOCYTES # BLD AUTO: 0.01 10*3/MM3 (ref 0–0.05)
IMM GRANULOCYTES NFR BLD AUTO: 0.4 % (ref 0–0.5)
LYMPHOCYTES # BLD AUTO: 0.64 10*3/MM3 (ref 0.7–3.1)
LYMPHOCYTES NFR BLD AUTO: 25.1 % (ref 19.6–45.3)
MCH RBC QN AUTO: 31.8 PG (ref 26.6–33)
MCHC RBC AUTO-ENTMCNC: 32.6 G/DL (ref 31.5–35.7)
MCV RBC AUTO: 97.7 FL (ref 79–97)
MONOCYTES # BLD AUTO: 0.38 10*3/MM3 (ref 0.1–0.9)
MONOCYTES NFR BLD AUTO: 14.9 % (ref 5–12)
NEUTROPHILS NFR BLD AUTO: 1.43 10*3/MM3 (ref 1.7–7)
NEUTROPHILS NFR BLD AUTO: 56.1 % (ref 42.7–76)
NRBC BLD AUTO-RTO: 0 /100 WBC (ref 0–0.2)
PLATELET # BLD AUTO: 128 10*3/MM3 (ref 140–450)
PMV BLD AUTO: 9.6 FL (ref 6–12)
POTASSIUM SERPL-SCNC: 3.8 MMOL/L (ref 3.5–5.2)
PROT SERPL-MCNC: 6.1 G/DL (ref 6–8.5)
RBC # BLD AUTO: 3.11 10*6/MM3 (ref 3.77–5.28)
SODIUM SERPL-SCNC: 140 MMOL/L (ref 136–145)
WBC NRBC COR # BLD: 2.55 10*3/MM3 (ref 3.4–10.8)

## 2022-12-02 PROCEDURE — 99214 OFFICE O/P EST MOD 30 MIN: CPT | Performed by: INTERNAL MEDICINE

## 2022-12-02 PROCEDURE — 96360 HYDRATION IV INFUSION INIT: CPT | Performed by: INTERNAL MEDICINE

## 2022-12-02 PROCEDURE — 80053 COMPREHEN METABOLIC PANEL: CPT

## 2022-12-02 PROCEDURE — 85025 COMPLETE CBC W/AUTO DIFF WBC: CPT

## 2022-12-02 PROCEDURE — 25010000002 HEPARIN LOCK FLUSH PER 10 UNITS: Performed by: INTERNAL MEDICINE

## 2022-12-02 PROCEDURE — 96361 HYDRATE IV INFUSION ADD-ON: CPT | Performed by: INTERNAL MEDICINE

## 2022-12-02 RX ORDER — SODIUM CHLORIDE 9 MG/ML
1000 INJECTION, SOLUTION INTRAVENOUS CONTINUOUS
Status: CANCELLED
Start: 2022-12-02

## 2022-12-02 RX ORDER — HEPARIN SODIUM (PORCINE) LOCK FLUSH IV SOLN 100 UNIT/ML 100 UNIT/ML
500 SOLUTION INTRAVENOUS AS NEEDED
Status: CANCELLED | OUTPATIENT
Start: 2022-12-09

## 2022-12-02 RX ORDER — SODIUM CHLORIDE 0.9 % (FLUSH) 0.9 %
10 SYRINGE (ML) INJECTION AS NEEDED
Status: DISCONTINUED | OUTPATIENT
Start: 2022-12-02 | End: 2022-12-02 | Stop reason: HOSPADM

## 2022-12-02 RX ORDER — HEPARIN SODIUM (PORCINE) LOCK FLUSH IV SOLN 100 UNIT/ML 100 UNIT/ML
500 SOLUTION INTRAVENOUS AS NEEDED
Status: DISCONTINUED | OUTPATIENT
Start: 2022-12-02 | End: 2022-12-02 | Stop reason: HOSPADM

## 2022-12-02 RX ORDER — SODIUM CHLORIDE 9 MG/ML
1000 INJECTION, SOLUTION INTRAVENOUS CONTINUOUS
Status: DISCONTINUED | OUTPATIENT
Start: 2022-12-02 | End: 2022-12-02 | Stop reason: HOSPADM

## 2022-12-02 RX ORDER — SODIUM CHLORIDE 0.9 % (FLUSH) 0.9 %
10 SYRINGE (ML) INJECTION AS NEEDED
Status: CANCELLED | OUTPATIENT
Start: 2022-12-02

## 2022-12-02 RX ADMIN — HEPARIN 500 UNITS: 100 SYRINGE at 10:47

## 2022-12-02 RX ADMIN — SODIUM CHLORIDE 1000 ML: 9 INJECTION, SOLUTION INTRAVENOUS at 08:47

## 2022-12-02 RX ADMIN — Medication 10 ML: at 10:47

## 2022-12-02 NOTE — PROGRESS NOTES
Subjective     Radha Duarte was seen in follow up for metastatic breast cancer.   She has been stable since last visit.  Denies any new aches or pains.  Chronic pain in right breast chest wall is overall stable.  No nausea or emesis.  No diarrhea.  No fever.  Reports fatigue and tiredness.  Blood pressure is low.  Denies any dizziness or lightheadedness.    Past Medical History, Past Surgical History, Social History, Family History have been reviewed and are without significant changes except as mentioned.        Medications:  The current medication list was reviewed in the EMR    ALLERGIES:    Allergies   Allergen Reactions   • Percocet [Oxycodone-Acetaminophen] Nausea Only       Objective      Vitals:    12/02/22 0810   BP: (!) 86/52   Pulse: 83   Resp: 18   Temp: 97.2 °F (36.2 °C)   SpO2: 96%   Weight: 55.3 kg (122 lb)   PainSc: 0-No pain     Current Status 11/11/2022   ECOG score 0       Physical Exam  Vitals and nursing note reviewed.   Constitutional:       Appearance: Normal appearance.   Musculoskeletal:      Right lower leg: No edema.      Left lower leg: No edema.   Neurological:      General: No focal deficit present.      Mental Status: She is alert and oriented to person, place, and time. Mental status is at baseline.   Psychiatric:         Mood and Affect: Mood normal.         Behavior: Behavior normal.         Thought Content: Thought content normal.               RECENT LABS:Independently reviewed and summarized  Hematology WBC   Date Value Ref Range Status   12/02/2022 2.55 (L) 3.40 - 10.80 10*3/mm3 Final     RBC   Date Value Ref Range Status   12/02/2022 3.11 (L) 3.77 - 5.28 10*6/mm3 Final     Hemoglobin   Date Value Ref Range Status   12/02/2022 9.9 (L) 12.0 - 15.9 g/dL Final     Hematocrit   Date Value Ref Range Status   12/02/2022 30.4 (L) 34.0 - 46.6 % Final     Platelets   Date Value Ref Range Status   12/02/2022 128 (L) 140 - 450 10*3/mm3 Final       Lab Results   Component Value  Date    GLUCOSE 91 12/02/2022    BUN 10 12/02/2022    CREATININE 0.64 12/02/2022    BCR 15.6 12/02/2022    K 3.8 12/02/2022    CO2 25.0 12/02/2022    CALCIUM 8.8 12/02/2022    ALBUMIN 3.60 12/02/2022    AST 15 12/02/2022    ALT 7 12/02/2022          Radha Duarte reports a pain score of 0.  Given her pain assessment as noted, treatment options were discussed and the following options were decided upon as a follow-up plan to address the patient's pain: continuation of current treatment plan for pain.    Patient screened negative for depression based on a PHQ-9 score of 0 on 12/2/2022.      Diagnosis:    (1) Metastatic breast cancer with distant LN (mediastinal) metastases   Triple negative   Unable to do perform foundation one due to limited tissue      Current therapy:   Weekly carboplatin/paclitaxel     (4/22/22- 7/7/22)      Discontinue to pain peripheral neuropathy in lower extremities.      Switch to XELODA (7//22/22)      PET scan on 9/19/22 showed progressive disease, mainly in right breast/chest wall/regional nodes.      Started on trodelvy.   D1C1: 10/4/22      Palliative RT to right breast/LN.      D1C2: 11/4/22  D8C2: 11/11/22     (2) Cancer associated pain   (3) Right upper extremity DVT   (4) Chemotherapy induce peripheral neuropathy   (5) Hypotension     Assessment & Plan     (1) Metastatic breast cancer with distant LN (mediastinal) metastases     Chronic, Stable.    She is currently on Trodelvy.  Tolerating treatment well without any major side effects.  Her blood pressure is low in the clinic.  I will give her 1 L of normal saline and over infusion area.  She also has grade 2 chemotherapy-induced neutropenia.  I will check CBC, CMP next week prior to day 1 cycle 3.  I will check PET scan in 2 weeks to evaluate response to treatment.        (2) Cancer associated pain     Chronic, stable.  She is on MS Contin 30 mg twice a day.  She will continue this.    (3) Right upper extremity  DVT     Chronic, stable.  She is on Eliquis.  No new concern for bleeding or thrombosis.  Continue with close monitoring    (4) Chemotherapy induce peripheral neuropathy     Chronic, stable.  She is on Neurontin 400 mg 3 times daily.  She will continue this.    (5) Hypotension     New issue.    Blood pressure is low.  Denies any nausea or emesis.  No diarrhea.  She feels like she is not drinking enough water.  Reports tiredness.  No fever or chills.  She is not taking any antihypertensive medication.  I will hold her chemotherapy today and give her 1 L of normal saline.  I will delay chemotherapy by 1 week.    12/2/2022      CC:

## 2022-12-05 ENCOUNTER — OFFICE VISIT (OUTPATIENT)
Dept: RADIATION ONCOLOGY | Facility: HOSPITAL | Age: 62
End: 2022-12-05

## 2022-12-05 VITALS
OXYGEN SATURATION: 97 % | BODY MASS INDEX: 21.89 KG/M2 | TEMPERATURE: 97.8 F | WEIGHT: 123.6 LBS | HEART RATE: 82 BPM | DIASTOLIC BLOOD PRESSURE: 57 MMHG | RESPIRATION RATE: 18 BRPM | SYSTOLIC BLOOD PRESSURE: 117 MMHG

## 2022-12-05 DIAGNOSIS — C50.919 MALIGNANT NEOPLASM OF FEMALE BREAST, UNSPECIFIED ESTROGEN RECEPTOR STATUS, UNSPECIFIED LATERALITY, UNSPECIFIED SITE OF BREAST: Primary | ICD-10-CM

## 2022-12-05 PROCEDURE — 99024 POSTOP FOLLOW-UP VISIT: CPT | Performed by: STUDENT IN AN ORGANIZED HEALTH CARE EDUCATION/TRAINING PROGRAM

## 2022-12-05 PROCEDURE — G0463 HOSPITAL OUTPT CLINIC VISIT: HCPCS | Performed by: STUDENT IN AN ORGANIZED HEALTH CARE EDUCATION/TRAINING PROGRAM

## 2022-12-05 NOTE — PROGRESS NOTES
Radiation Oncology Follow Up    Patient: Radha Duarte   YOB: 1960   Medical Record Number: [id number]   Date of Visit: December 5, 2022   Primary Diagnosis:  Cancer Staging   Malignant neoplasm of female breast (HCC)  Staging form: Breast, AJCC 8th Edition  - Clinical stage from 5/15/2022: Stage IV (cT3, cN2a, pM1, G3, ER-, MN-, HER2-) - Signed by Trisha Meade MD on 5/15/2022      Treatment:  - carboplatin/paclitaxel 5/16/2022-6/7/2022  - capecitabine 7/7/2022-10/2022  - right breast and regional disease palliative intent radiation to 2372-5861 cGy in 15 fx completed 11/1/2022  - sacituzumab 10/4/2022    Implanted Devices: none                                            History of Present Illness:  Ms. Duarte is a 62 y.o. female with metastatic right breast cancer. She presented in 11/2021 with a right chest wall mass. CT neck and chest in 2/2022 showed right axillary, retropectoral, and mediastinal lymphadenopathy. Right chest LN biopsy on 2/22/2022 showed poorly differentiated carcinoma. PET on 3/15/2022 showed a right subpectoral presumed primary with right axillary, IMN, BL supraclavicular, and mediastinal LAD. Breast MRI on 3/18/2022 showed a discontiguous mass with enhancement mainly in the superior right breast. Biopsy of the right breast on 3/22/2022 showed poorly differentiated carcinoma. She initially started on carboplatin/paclitaxel but had to discontinue due to neuropathy. Interim PET on 6/20/2022 showed no residual avidity. 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 Repeat PET on 9/19/2022 unfortunately showed avidity in the right breast, axilla, and subclavian regions. She also had significant new right arm and breast swelling and pain. She completed palliative radiation on 11/1/2022. She started sacituzumab 10/4/2022    Doing fairly well. She does have nausea and changes in bowel habits with chemotherapy, has PRN  medications. Right arm swelling is improving. Right breast and chest wall nodules have improved. She has noted right lateral chest wall pain over the past few weeks but this is improving now. Weight is stable.    Review of Systems       All other systems reviewed and are negative.    Past Medical History:   Diagnosis Date   • Acute deep vein thrombosis (DVT) of brachial vein of right upper extremity (HCC) 03/2022   • Anemia    • Breast cancer (HCC) 02/22/2022   • Encounter for antineoplastic chemotherapy     2 cycles Taxol/Carbo before   • Hyperlipidemia    • Osteoporosis     neck, lower back   • Varicose veins of right lower extremity     hx blood clot in right lower calf - age 21        Past Surgical History:   Procedure Laterality Date   • BREAST BIOPSY Right 02/22/2022   • COLONOSCOPY N/A 03/09/2020    Procedure: COLONOSCOPY;  Surgeon: Bishop Rodriguez DO;  Location: [facility] ENDOSCOPY;  Service: Gastroenterology;  Laterality: N/A;   • MEDIPORT INSERTION, SINGLE Left 05/17/2022   • SHOULDER MANIPULATION Bilateral 2003    frozen shoulder   • VARICOSE VEIN SURGERY  1986      Family History   Problem Relation Age of Onset   • ALS Mother    • Ulcers Mother    • Heart disease Father    • Alcohol abuse Father    • Cancer Brother    • Stroke Brother    • COPD Brother    • Anuerysm Brother    • Heart disease Paternal Grandmother    • Diabetes Paternal Grandmother    • Stroke Paternal Aunt         Social History     Socioeconomic History   • Marital status:    • Number of children: 4   • Highest education level: GED or equivalent   Tobacco Use   • Smoking status: Every Day     Packs/day: 1.00     Years: 40.00     Pack years: 40.00     Types: Cigarettes     Start date: 1982   • Smokeless tobacco: Never   • Tobacco comments:     Depends on day-sometimes a pack 3/4-1 pack, stopped 2 years   Vaping Use   • Vaping Use: Former   • Substances: Nicotine, Flavoring, made throat dry, cough   • Devices: Pre-filled or  refillable cartridge   Substance and Sexual Activity   • Alcohol use: Never   • Drug use: Never   • Sexual activity: Not Currently     Partners: Male       Allergies:  Percocet [oxycodone-acetaminophen]   Prior to Admission medications    Medication Sig Start Date End Date Taking? Authorizing Provider   Calcium Carb-Cholecalciferol (CALCIUM/VITAMIN D PO) Take 1 tablet by mouth Daily.    ProviderOmid MD   docusate sodium (Colace) 100 MG capsule Take 1 capsule by mouth 2 (Two) Times a Day.  Patient taking differently: Take 100 mg by mouth Daily. 9/21/22   Trisha Meade MD   DPH-Lido-AlHydr-MgHydr-Simeth (First Mouthwash, Magic Mouthwash,) suspension Swish and spit 5 mL Every 6 (Six) Hours. 5/16/22   Trisha Meade MD   Eliquis 5 MG tablet tablet TAKE 1 TABLET EVERY 12 HOURS 9/28/22   Trisha Meaed MD   gabapentin (NEURONTIN) 400 MG capsule TAKE 1 CAPSULE THREE TIMES A DAY 11/11/22   Trisha Meade MD   HYDROcodone-acetaminophen (NORCO) 5-325 MG per tablet Take 1 tablet by mouth Every 6 (Six) Hours As Needed for Severe Pain . 5/16/22   Trisha Meade MD   Morphine (MS CONTIN) 15 MG 12 hr tablet Take 2 tablets by mouth 2 (Two) Times a Day. 11/17/22   Trisha Meade MD   multivitamin with minerals tablet tablet Take 1 tablet by mouth Daily.    Provider, MD Omid   OLANZapine (zyPREXA) 5 MG tablet Take 1 tablet by mouth Every Night. Take on day 2, 3, and 4 and Days 9, 10, 11 after chemotherapy. 10/28/22   Trisha Meade MD   omeprazole (priLOSEC) 20 MG capsule TAKE 1 CAPSULE TWICE A DAY 8/19/22   Trisha Meade MD   ondansetron (ZOFRAN) 8 MG tablet Take 1 tablet by mouth 3 (Three) Times a Day As Needed for Nausea or Vomiting. 10/4/22   Trisha Meade MD   potassium chloride (K-DUR,KLOR-CON) 20 MEQ CR tablet Take 1 tablet by mouth Daily. 9/7/22   Trisha Meade MD   Probiotic Product (PROBIOTIC DAILY PO)  Take 1 dose by mouth Daily.    Provider, MD Omid   promethazine (PHENERGAN) 12.5 MG tablet TAKE 1 TABLET EVERY 6 HOURS AS NEEDED FOR NAUSEA 11/11/22   Trisha Meade MD        Pain:(on a scale of 0-10)    Pain Score    12/05/22 1450   PainSc: 0-No pain        Radha Duarte reports a pain score of 0.  Given her pain assessment as noted, treatment options were discussed and the following options were decided upon as a follow-up plan to address the patient's pain: continuation of current treatment plan for pain.       Quality of Life:   ECOG: (0) Fully active, able to carry on all predisease performance without restriction    Advance Directives (For Healthcare)  Pre-existing AND/MOST/POLST Order: No  Advance Directive Status: Patient has advance directive, copy in chart  Type of Advance Directive: Durable power of  for health care, Health care directive/Living will  Have you reviewed your Advance Directive and is it valid for this stay?: Yes    PHQ-9 Depression Screening:  Little interest or pleasure in doing things? 0-->not at all   Feeling down, depressed, or hopeless? 0-->not at all   Trouble falling or staying asleep, or sleeping too much?     Feeling tired or having little energy?     Poor appetite or overeating?     Feeling bad about yourself - or that you are a failure or have let yourself or your family down?     Trouble concentrating on things, such as reading the newspaper or watching television?     Moving or speaking so slowly that other people could have noticed? Or the opposite - being so fidgety or restless that you have been moving around a lot more than usual?     Thoughts that you would be better off dead, or of hurting yourself in some way?     PHQ-9 Total Score 0   If you checked off any problems, how difficult have these problems made it for you to do your work, take care of things at home, or get along with other people?      PHQ-9 Total Score: 0       Physical  Examination:  Vitals:    12/05/22 1450   BP: 117/57   Pulse: 82   Resp: 18   Temp: 97.8 °F (36.6 °C)   SpO2: 97%     Wt Readings from Last 3 Encounters:   12/05/22 56.1 kg (123 lb 9.6 oz)   12/02/22 55.3 kg (122 lb)   11/04/22 55.3 kg (122 lb)     Body mass index is 21.89 kg/m².    Constitutional: The patient is a well-developed, well-nourished female in no acute distress.  HEENT: Normocephalic, atraumatic. EOMI. Nose and ears without abnormalities upon visual inspection.  Lymphatics: Right arm lymphedema present, slightly improved since treatment. No cervical/supraclavicular lymphadenopathy is palpated bilaterally.  CV: Regular rate and rhythm. No murmurs/rubs/gallops/clicks.  Respiratory: Breathing comfortably on room air. Lungs clear to auscultation bilaterally.  Breasts: Right breast smaller than left, with lymphedema and hyperpigmentation. Female nurse chaperone present for entire exam.  GI: Abdomen soft, nontender, nondistended, with no hepatosplenomegaly or masses palpated. Bowel sounds normoactive.  Musculoskeletal: No clubbing, cyanosis, or edema.  Skin: Prior subcutaneous lesions no longer apparent or palpable. No abnormal lesions noted on visible skin  Neurologic: Cranial nerves grossly intact, with no focal neurological deficits noted on exam.  Psychiatric: Alert and oriented. Normal affect, with no anxiety or depression noted.      Imaging:  CT Chest With Contrast Diagnostic    Result Date: 8/19/2022   IMPRESSION: Questionable 0.8 cm noncalcified, partly solid lung nodular focus in the left lower lobe best seen on sagittal image 20. Recommend three month follow-up chest CT. There are a few nodular foci in the subcutaneous tissues of the right lateral breast, right axilla and right lateral chest wall with the largest measures 0.8 x 0.9 cm, which could be due to postoperative, infectious, inflammatory, or neoplastic etiologies. There is right breast skin thickening probably related to cancer treatment  changes. Electronically signed by:  Edu Pichardo MD  8/19/2022 8:34 AM CDT Workstation: [id number]    NM PET/CT Skull Base to Mid Thigh    Result Date: 9/21/2022  CONCLUSION: 1.  Large focus of hypermetabolic activity in the right breast, SUV max 5.3. 2.  Hypermetabolic focus in the right axilla with SUV max of 3.1 associated with a non-pathologically enlarged lymph node suspicious for metastatic disease. 3.  Hypermetabolic activity in the right subclavian region SUV max 3.0 suspicious for metastatic lymphadenopathy. 4.  There is skin thickening involving the right breast. 5.  Tree-in-bud opacities noted in the posterior periphery of the left lower lobe similar to the previous exams. Most commonly due to infectious bronchiolitis (acute: viral and bacterial (e.g. mycoplasma) or chronic: tuberculosis and nontuberculous mycobacterial infection). Electronically signed by:  Edu Pichardo MD  9/21/2022 9:50 AM CDT Workstation: [id number]      Recent Pathology:   none    Labs:   Lab Results   Component Value Date    GLUCOSE 91 12/02/2022    BUN 10 12/02/2022    CREATININE 0.64 12/02/2022    BCR 15.6 12/02/2022    K 3.8 12/02/2022    CO2 25.0 12/02/2022    CALCIUM 8.8 12/02/2022    ALBUMIN 3.60 12/02/2022    AST 15 12/02/2022    ALT 7 12/02/2022      WBC   Date Value Ref Range Status   12/02/2022 2.55 (L) 3.40 - 10.80 10*3/mm3 Final     Hemoglobin   Date Value Ref Range Status   12/02/2022 9.9 (L) 12.0 - 15.9 g/dL Final     Hematocrit   Date Value Ref Range Status   12/02/2022 30.4 (L) 34.0 - 46.6 % Final     Platelets   Date Value Ref Range Status   12/02/2022 128 (L) 140 - 450 10*3/mm3 Final       ASSESSMENT/PLAN:  Ms. Duarte is a 62 y.o. female with metastatic right breast cancer treated with systemic therapy and palliative radiation. Personally reviewed available physician notes, imaging, and pathology since last visit.    Doing better after radiation, lymphedema still present but somewhat better. Tolerating  chemotherapy with some nausea and bowel habit changes. Weight is stable. Overall good response to palliative radiation therapy. Continue chemotherapy. F/u PRN.    Electronically signed by Radha Srivastava MD 12/05/22 15:04 CST

## 2022-12-05 NOTE — NURSING NOTE
Patient give verbal consent to breast exam in office with Dr. Srivastava--chaperoned by me, verbalizes okay for daughter to remain in the room.  Joana Valente RN  December 5, 2022  1500

## 2022-12-09 ENCOUNTER — INFUSION (OUTPATIENT)
Dept: ONCOLOGY | Facility: HOSPITAL | Age: 62
End: 2022-12-09

## 2022-12-09 VITALS
TEMPERATURE: 98.5 F | SYSTOLIC BLOOD PRESSURE: 112 MMHG | HEART RATE: 89 BPM | DIASTOLIC BLOOD PRESSURE: 53 MMHG | RESPIRATION RATE: 18 BRPM

## 2022-12-09 DIAGNOSIS — C50.919 MALIGNANT NEOPLASM OF FEMALE BREAST, UNSPECIFIED ESTROGEN RECEPTOR STATUS, UNSPECIFIED LATERALITY, UNSPECIFIED SITE OF BREAST: Primary | ICD-10-CM

## 2022-12-09 LAB
ALBUMIN SERPL-MCNC: 3.6 G/DL (ref 3.5–5.2)
ALBUMIN/GLOB SERPL: 1.5 G/DL
ALP SERPL-CCNC: 72 U/L (ref 39–117)
ALT SERPL W P-5'-P-CCNC: 7 U/L (ref 1–33)
ANION GAP SERPL CALCULATED.3IONS-SCNC: 7 MMOL/L (ref 5–15)
AST SERPL-CCNC: 15 U/L (ref 1–32)
BASOPHILS # BLD AUTO: 0.02 10*3/MM3 (ref 0–0.2)
BASOPHILS NFR BLD AUTO: 0.6 % (ref 0–1.5)
BILIRUB SERPL-MCNC: 0.3 MG/DL (ref 0–1.2)
BUN SERPL-MCNC: 8 MG/DL (ref 8–23)
BUN/CREAT SERPL: 12.9 (ref 7–25)
CALCIUM SPEC-SCNC: 8.9 MG/DL (ref 8.6–10.5)
CHLORIDE SERPL-SCNC: 110 MMOL/L (ref 98–107)
CO2 SERPL-SCNC: 27 MMOL/L (ref 22–29)
CREAT SERPL-MCNC: 0.62 MG/DL (ref 0.57–1)
DEPRECATED RDW RBC AUTO: 51.1 FL (ref 37–54)
EGFRCR SERPLBLD CKD-EPI 2021: 99.6 ML/MIN/1.73
EOSINOPHIL # BLD AUTO: 0.06 10*3/MM3 (ref 0–0.4)
EOSINOPHIL NFR BLD AUTO: 1.9 % (ref 0.3–6.2)
ERYTHROCYTE [DISTWIDTH] IN BLOOD BY AUTOMATED COUNT: 14.5 % (ref 12.3–15.4)
GLOBULIN UR ELPH-MCNC: 2.4 GM/DL
GLUCOSE SERPL-MCNC: 95 MG/DL (ref 65–99)
HCT VFR BLD AUTO: 29.2 % (ref 34–46.6)
HGB BLD-MCNC: 9.6 G/DL (ref 12–15.9)
HOLD SPECIMEN: NORMAL
IMM GRANULOCYTES # BLD AUTO: 0.02 10*3/MM3 (ref 0–0.05)
IMM GRANULOCYTES NFR BLD AUTO: 0.6 % (ref 0–0.5)
LYMPHOCYTES # BLD AUTO: 0.83 10*3/MM3 (ref 0.7–3.1)
LYMPHOCYTES NFR BLD AUTO: 26 % (ref 19.6–45.3)
MCH RBC QN AUTO: 32 PG (ref 26.6–33)
MCHC RBC AUTO-ENTMCNC: 32.9 G/DL (ref 31.5–35.7)
MCV RBC AUTO: 97.3 FL (ref 79–97)
MONOCYTES # BLD AUTO: 0.41 10*3/MM3 (ref 0.1–0.9)
MONOCYTES NFR BLD AUTO: 12.9 % (ref 5–12)
NEUTROPHILS NFR BLD AUTO: 1.85 10*3/MM3 (ref 1.7–7)
NEUTROPHILS NFR BLD AUTO: 58 % (ref 42.7–76)
NRBC BLD AUTO-RTO: 0 /100 WBC (ref 0–0.2)
PLATELET # BLD AUTO: 137 10*3/MM3 (ref 140–450)
PMV BLD AUTO: 9.3 FL (ref 6–12)
POTASSIUM SERPL-SCNC: 3.8 MMOL/L (ref 3.5–5.2)
PROT SERPL-MCNC: 6 G/DL (ref 6–8.5)
RBC # BLD AUTO: 3 10*6/MM3 (ref 3.77–5.28)
SODIUM SERPL-SCNC: 144 MMOL/L (ref 136–145)
WBC NRBC COR # BLD: 3.19 10*3/MM3 (ref 3.4–10.8)

## 2022-12-09 PROCEDURE — 96413 CHEMO IV INFUSION 1 HR: CPT | Performed by: INTERNAL MEDICINE

## 2022-12-09 PROCEDURE — 36415 COLL VENOUS BLD VENIPUNCTURE: CPT

## 2022-12-09 PROCEDURE — 25010000002 SACITUZUMAB GOVITECAN-HZIY 180 MG RECONSTITUTED SOLUTION 1 EACH VIAL: Performed by: INTERNAL MEDICINE

## 2022-12-09 PROCEDURE — 25010000002 HEPARIN LOCK FLUSH PER 10 UNITS: Performed by: INTERNAL MEDICINE

## 2022-12-09 PROCEDURE — 25010000002 DEXAMETHASONE SODIUM PHOSPHATE 100 MG/10ML SOLUTION: Performed by: INTERNAL MEDICINE

## 2022-12-09 PROCEDURE — 96367 TX/PROPH/DG ADDL SEQ IV INF: CPT | Performed by: INTERNAL MEDICINE

## 2022-12-09 PROCEDURE — 96375 TX/PRO/DX INJ NEW DRUG ADDON: CPT | Performed by: INTERNAL MEDICINE

## 2022-12-09 PROCEDURE — 25010000002 FOSAPREPITANT PER 1 MG: Performed by: INTERNAL MEDICINE

## 2022-12-09 PROCEDURE — 80053 COMPREHEN METABOLIC PANEL: CPT

## 2022-12-09 PROCEDURE — 85025 COMPLETE CBC W/AUTO DIFF WBC: CPT

## 2022-12-09 PROCEDURE — 25010000002 PALONOSETRON PER 25 MCG: Performed by: INTERNAL MEDICINE

## 2022-12-09 PROCEDURE — 25010000002 DIPHENHYDRAMINE PER 50 MG: Performed by: INTERNAL MEDICINE

## 2022-12-09 RX ORDER — FAMOTIDINE 10 MG/ML
20 INJECTION, SOLUTION INTRAVENOUS ONCE
Status: CANCELLED | OUTPATIENT
Start: 2022-12-09

## 2022-12-09 RX ORDER — PALONOSETRON 0.05 MG/ML
0.25 INJECTION, SOLUTION INTRAVENOUS ONCE
Status: CANCELLED | OUTPATIENT
Start: 2022-12-09

## 2022-12-09 RX ORDER — ATROPINE SULFATE 1 MG/ML
0.25 INJECTION, SOLUTION INTRAMUSCULAR; INTRAVENOUS; SUBCUTANEOUS
Status: DISCONTINUED | OUTPATIENT
Start: 2022-12-09 | End: 2022-12-09 | Stop reason: HOSPADM

## 2022-12-09 RX ORDER — FAMOTIDINE 10 MG/ML
20 INJECTION, SOLUTION INTRAVENOUS ONCE
Status: COMPLETED | OUTPATIENT
Start: 2022-12-09 | End: 2022-12-09

## 2022-12-09 RX ORDER — ATROPINE SULFATE 1 MG/ML
0.25 INJECTION, SOLUTION INTRAMUSCULAR; INTRAVENOUS; SUBCUTANEOUS
Status: CANCELLED | OUTPATIENT
Start: 2022-12-09

## 2022-12-09 RX ORDER — HEPARIN SODIUM (PORCINE) LOCK FLUSH IV SOLN 100 UNIT/ML 100 UNIT/ML
500 SOLUTION INTRAVENOUS AS NEEDED
Status: DISCONTINUED | OUTPATIENT
Start: 2022-12-09 | End: 2022-12-09 | Stop reason: HOSPADM

## 2022-12-09 RX ORDER — DIPHENHYDRAMINE HYDROCHLORIDE 50 MG/ML
50 INJECTION INTRAMUSCULAR; INTRAVENOUS AS NEEDED
Status: CANCELLED | OUTPATIENT
Start: 2022-12-09

## 2022-12-09 RX ORDER — OLANZAPINE 5 MG/1
5 TABLET ORAL ONCE
Status: COMPLETED | OUTPATIENT
Start: 2022-12-09 | End: 2022-12-09

## 2022-12-09 RX ORDER — HEPARIN SODIUM (PORCINE) LOCK FLUSH IV SOLN 100 UNIT/ML 100 UNIT/ML
500 SOLUTION INTRAVENOUS AS NEEDED
Status: CANCELLED | OUTPATIENT
Start: 2022-12-09

## 2022-12-09 RX ORDER — DIPHENHYDRAMINE HYDROCHLORIDE 50 MG/ML
50 INJECTION INTRAMUSCULAR; INTRAVENOUS AS NEEDED
Status: DISCONTINUED | OUTPATIENT
Start: 2022-12-09 | End: 2022-12-09 | Stop reason: HOSPADM

## 2022-12-09 RX ORDER — OLANZAPINE 5 MG/1
5 TABLET ORAL ONCE
Status: CANCELLED | OUTPATIENT
Start: 2022-12-09 | End: 2022-12-09

## 2022-12-09 RX ORDER — PALONOSETRON 0.05 MG/ML
0.25 INJECTION, SOLUTION INTRAVENOUS ONCE
Status: COMPLETED | OUTPATIENT
Start: 2022-12-09 | End: 2022-12-09

## 2022-12-09 RX ORDER — FAMOTIDINE 10 MG/ML
20 INJECTION, SOLUTION INTRAVENOUS AS NEEDED
Status: CANCELLED | OUTPATIENT
Start: 2022-12-09

## 2022-12-09 RX ORDER — SODIUM CHLORIDE 0.9 % (FLUSH) 0.9 %
10 SYRINGE (ML) INJECTION AS NEEDED
Status: DISCONTINUED | OUTPATIENT
Start: 2022-12-09 | End: 2022-12-09 | Stop reason: HOSPADM

## 2022-12-09 RX ORDER — SODIUM CHLORIDE 0.9 % (FLUSH) 0.9 %
10 SYRINGE (ML) INJECTION AS NEEDED
Status: CANCELLED | OUTPATIENT
Start: 2022-12-09

## 2022-12-09 RX ORDER — ACETAMINOPHEN 325 MG/1
650 TABLET ORAL ONCE
Status: COMPLETED | OUTPATIENT
Start: 2022-12-09 | End: 2022-12-09

## 2022-12-09 RX ORDER — FAMOTIDINE 10 MG/ML
20 INJECTION, SOLUTION INTRAVENOUS AS NEEDED
Status: DISCONTINUED | OUTPATIENT
Start: 2022-12-09 | End: 2022-12-09 | Stop reason: HOSPADM

## 2022-12-09 RX ORDER — ACETAMINOPHEN 325 MG/1
650 TABLET ORAL ONCE
Status: CANCELLED | OUTPATIENT
Start: 2022-12-09

## 2022-12-09 RX ORDER — SODIUM CHLORIDE 9 MG/ML
250 INJECTION, SOLUTION INTRAVENOUS ONCE
Status: COMPLETED | OUTPATIENT
Start: 2022-12-09 | End: 2022-12-09

## 2022-12-09 RX ORDER — SODIUM CHLORIDE 9 MG/ML
250 INJECTION, SOLUTION INTRAVENOUS ONCE
Status: CANCELLED | OUTPATIENT
Start: 2022-12-09

## 2022-12-09 RX ADMIN — SODIUM CHLORIDE 250 ML: 9 INJECTION, SOLUTION INTRAVENOUS at 08:25

## 2022-12-09 RX ADMIN — FAMOTIDINE 20 MG: 10 INJECTION INTRAVENOUS at 09:18

## 2022-12-09 RX ADMIN — DIPHENHYDRAMINE HYDROCHLORIDE 25 MG: 50 INJECTION, SOLUTION INTRAMUSCULAR; INTRAVENOUS at 09:23

## 2022-12-09 RX ADMIN — Medication 10 ML: at 11:35

## 2022-12-09 RX ADMIN — DEXAMETHASONE SODIUM PHOSPHATE 12 MG: 10 INJECTION, SOLUTION INTRAMUSCULAR; INTRAVENOUS at 09:54

## 2022-12-09 RX ADMIN — PALONOSETRON 0.25 MG: 0.05 INJECTION, SOLUTION INTRAVENOUS at 08:25

## 2022-12-09 RX ADMIN — SACITUZUMAB GOVITECAN 540 MG: 180 POWDER, FOR SOLUTION INTRAVENOUS at 10:25

## 2022-12-09 RX ADMIN — OLANZAPINE 5 MG: 5 TABLET, FILM COATED ORAL at 08:25

## 2022-12-09 RX ADMIN — FOSAPREPITANT 100 ML: 150 INJECTION, POWDER, LYOPHILIZED, FOR SOLUTION INTRAVENOUS at 08:43

## 2022-12-09 RX ADMIN — ACETAMINOPHEN 650 MG: 325 TABLET ORAL at 09:16

## 2022-12-09 RX ADMIN — HEPARIN 500 UNITS: 100 SYRINGE at 11:35

## 2022-12-16 ENCOUNTER — OFFICE VISIT (OUTPATIENT)
Dept: ONCOLOGY | Facility: CLINIC | Age: 62
End: 2022-12-16

## 2022-12-16 ENCOUNTER — INFUSION (OUTPATIENT)
Dept: ONCOLOGY | Facility: HOSPITAL | Age: 62
End: 2022-12-16

## 2022-12-16 VITALS
WEIGHT: 121 LBS | HEART RATE: 79 BPM | BODY MASS INDEX: 21.43 KG/M2 | TEMPERATURE: 96.6 F | DIASTOLIC BLOOD PRESSURE: 56 MMHG | OXYGEN SATURATION: 94 % | SYSTOLIC BLOOD PRESSURE: 105 MMHG | RESPIRATION RATE: 18 BRPM

## 2022-12-16 DIAGNOSIS — C50.919 MALIGNANT NEOPLASM OF FEMALE BREAST, UNSPECIFIED ESTROGEN RECEPTOR STATUS, UNSPECIFIED LATERALITY, UNSPECIFIED SITE OF BREAST: ICD-10-CM

## 2022-12-16 DIAGNOSIS — C50.919 MALIGNANT NEOPLASM OF FEMALE BREAST, UNSPECIFIED ESTROGEN RECEPTOR STATUS, UNSPECIFIED LATERALITY, UNSPECIFIED SITE OF BREAST: Primary | ICD-10-CM

## 2022-12-16 LAB
ALBUMIN SERPL-MCNC: 3.9 G/DL (ref 3.5–5.2)
ALBUMIN/GLOB SERPL: 1.9 G/DL
ALP SERPL-CCNC: 68 U/L (ref 39–117)
ALT SERPL W P-5'-P-CCNC: 8 U/L (ref 1–33)
ANION GAP SERPL CALCULATED.3IONS-SCNC: 8 MMOL/L (ref 5–15)
AST SERPL-CCNC: 14 U/L (ref 1–32)
BASOPHILS # BLD AUTO: 0.02 10*3/MM3 (ref 0–0.2)
BASOPHILS NFR BLD AUTO: 0.7 % (ref 0–1.5)
BILIRUB SERPL-MCNC: 0.2 MG/DL (ref 0–1.2)
BUN SERPL-MCNC: 13 MG/DL (ref 8–23)
BUN/CREAT SERPL: 26.5 (ref 7–25)
CALCIUM SPEC-SCNC: 8.9 MG/DL (ref 8.6–10.5)
CHLORIDE SERPL-SCNC: 109 MMOL/L (ref 98–107)
CO2 SERPL-SCNC: 25 MMOL/L (ref 22–29)
CREAT SERPL-MCNC: 0.49 MG/DL (ref 0.57–1)
DEPRECATED RDW RBC AUTO: 49.9 FL (ref 37–54)
EGFRCR SERPLBLD CKD-EPI 2021: 105.4 ML/MIN/1.73
EOSINOPHIL # BLD AUTO: 0.04 10*3/MM3 (ref 0–0.4)
EOSINOPHIL NFR BLD AUTO: 1.4 % (ref 0.3–6.2)
ERYTHROCYTE [DISTWIDTH] IN BLOOD BY AUTOMATED COUNT: 14.2 % (ref 12.3–15.4)
GLOBULIN UR ELPH-MCNC: 2.1 GM/DL
GLUCOSE SERPL-MCNC: 87 MG/DL (ref 65–99)
HCT VFR BLD AUTO: 28.8 % (ref 34–46.6)
HGB BLD-MCNC: 9.5 G/DL (ref 12–15.9)
HOLD SPECIMEN: NORMAL
IMM GRANULOCYTES # BLD AUTO: 0.04 10*3/MM3 (ref 0–0.05)
IMM GRANULOCYTES NFR BLD AUTO: 1.4 % (ref 0–0.5)
LYMPHOCYTES # BLD AUTO: 0.75 10*3/MM3 (ref 0.7–3.1)
LYMPHOCYTES NFR BLD AUTO: 26 % (ref 19.6–45.3)
MCH RBC QN AUTO: 32.1 PG (ref 26.6–33)
MCHC RBC AUTO-ENTMCNC: 33 G/DL (ref 31.5–35.7)
MCV RBC AUTO: 97.3 FL (ref 79–97)
MONOCYTES # BLD AUTO: 0.36 10*3/MM3 (ref 0.1–0.9)
MONOCYTES NFR BLD AUTO: 12.5 % (ref 5–12)
NEUTROPHILS NFR BLD AUTO: 1.67 10*3/MM3 (ref 1.7–7)
NEUTROPHILS NFR BLD AUTO: 58 % (ref 42.7–76)
NRBC BLD AUTO-RTO: 0 /100 WBC (ref 0–0.2)
PLATELET # BLD AUTO: 155 10*3/MM3 (ref 140–450)
PMV BLD AUTO: 9.4 FL (ref 6–12)
POTASSIUM SERPL-SCNC: 3.7 MMOL/L (ref 3.5–5.2)
PROT SERPL-MCNC: 6 G/DL (ref 6–8.5)
RBC # BLD AUTO: 2.96 10*6/MM3 (ref 3.77–5.28)
SODIUM SERPL-SCNC: 142 MMOL/L (ref 136–145)
WBC NRBC COR # BLD: 2.88 10*3/MM3 (ref 3.4–10.8)

## 2022-12-16 PROCEDURE — 25010000002 DIPHENHYDRAMINE PER 50 MG: Performed by: NURSE PRACTITIONER

## 2022-12-16 PROCEDURE — 96375 TX/PRO/DX INJ NEW DRUG ADDON: CPT | Performed by: NURSE PRACTITIONER

## 2022-12-16 PROCEDURE — 85025 COMPLETE CBC W/AUTO DIFF WBC: CPT

## 2022-12-16 PROCEDURE — 36415 COLL VENOUS BLD VENIPUNCTURE: CPT

## 2022-12-16 PROCEDURE — 96367 TX/PROPH/DG ADDL SEQ IV INF: CPT | Performed by: NURSE PRACTITIONER

## 2022-12-16 PROCEDURE — 99214 OFFICE O/P EST MOD 30 MIN: CPT | Performed by: NURSE PRACTITIONER

## 2022-12-16 PROCEDURE — 25010000002 HEPARIN LOCK FLUSH PER 10 UNITS: Performed by: INTERNAL MEDICINE

## 2022-12-16 PROCEDURE — 96413 CHEMO IV INFUSION 1 HR: CPT | Performed by: NURSE PRACTITIONER

## 2022-12-16 PROCEDURE — 25010000002 FOSAPREPITANT PER 1 MG: Performed by: NURSE PRACTITIONER

## 2022-12-16 PROCEDURE — 25010000002 SACITUZUMAB GOVITECAN-HZIY 180 MG RECONSTITUTED SOLUTION 1 EACH VIAL: Performed by: NURSE PRACTITIONER

## 2022-12-16 PROCEDURE — 25010000002 PALONOSETRON PER 25 MCG: Performed by: NURSE PRACTITIONER

## 2022-12-16 PROCEDURE — 80053 COMPREHEN METABOLIC PANEL: CPT

## 2022-12-16 PROCEDURE — 25010000002 DEXAMETHASONE SODIUM PHOSPHATE 100 MG/10ML SOLUTION: Performed by: NURSE PRACTITIONER

## 2022-12-16 RX ORDER — ATROPINE SULFATE 1 MG/ML
0.25 INJECTION, SOLUTION INTRAMUSCULAR; INTRAVENOUS; SUBCUTANEOUS
Status: DISCONTINUED | OUTPATIENT
Start: 2022-12-16 | End: 2022-12-16 | Stop reason: HOSPADM

## 2022-12-16 RX ORDER — FAMOTIDINE 10 MG/ML
20 INJECTION, SOLUTION INTRAVENOUS ONCE
Status: CANCELLED | OUTPATIENT
Start: 2022-12-16

## 2022-12-16 RX ORDER — PALONOSETRON 0.05 MG/ML
0.25 INJECTION, SOLUTION INTRAVENOUS ONCE
Status: CANCELLED | OUTPATIENT
Start: 2022-12-16

## 2022-12-16 RX ORDER — SODIUM CHLORIDE 9 MG/ML
250 INJECTION, SOLUTION INTRAVENOUS ONCE
Status: COMPLETED | OUTPATIENT
Start: 2022-12-16 | End: 2022-12-16

## 2022-12-16 RX ORDER — SODIUM CHLORIDE 0.9 % (FLUSH) 0.9 %
10 SYRINGE (ML) INJECTION AS NEEDED
Status: DISCONTINUED | OUTPATIENT
Start: 2022-12-16 | End: 2022-12-16 | Stop reason: HOSPADM

## 2022-12-16 RX ORDER — OLANZAPINE 5 MG/1
5 TABLET ORAL ONCE
Status: CANCELLED | OUTPATIENT
Start: 2022-12-16 | End: 2022-12-16

## 2022-12-16 RX ORDER — ACETAMINOPHEN 325 MG/1
650 TABLET ORAL ONCE
Status: CANCELLED | OUTPATIENT
Start: 2022-12-16

## 2022-12-16 RX ORDER — FAMOTIDINE 10 MG/ML
20 INJECTION, SOLUTION INTRAVENOUS ONCE
Status: COMPLETED | OUTPATIENT
Start: 2022-12-16 | End: 2022-12-16

## 2022-12-16 RX ORDER — SODIUM CHLORIDE 9 MG/ML
250 INJECTION, SOLUTION INTRAVENOUS ONCE
Status: CANCELLED | OUTPATIENT
Start: 2022-12-16

## 2022-12-16 RX ORDER — DIPHENHYDRAMINE HYDROCHLORIDE 50 MG/ML
50 INJECTION INTRAMUSCULAR; INTRAVENOUS AS NEEDED
Status: DISCONTINUED | OUTPATIENT
Start: 2022-12-16 | End: 2022-12-16 | Stop reason: HOSPADM

## 2022-12-16 RX ORDER — FAMOTIDINE 10 MG/ML
20 INJECTION, SOLUTION INTRAVENOUS AS NEEDED
Status: DISCONTINUED | OUTPATIENT
Start: 2022-12-16 | End: 2022-12-16 | Stop reason: HOSPADM

## 2022-12-16 RX ORDER — ACETAMINOPHEN 325 MG/1
650 TABLET ORAL ONCE
Status: COMPLETED | OUTPATIENT
Start: 2022-12-16 | End: 2022-12-16

## 2022-12-16 RX ORDER — PALONOSETRON 0.05 MG/ML
0.25 INJECTION, SOLUTION INTRAVENOUS ONCE
Status: COMPLETED | OUTPATIENT
Start: 2022-12-16 | End: 2022-12-16

## 2022-12-16 RX ORDER — ATROPINE SULFATE 1 MG/ML
0.25 INJECTION, SOLUTION INTRAMUSCULAR; INTRAVENOUS; SUBCUTANEOUS
Status: CANCELLED | OUTPATIENT
Start: 2022-12-16

## 2022-12-16 RX ORDER — OLANZAPINE 5 MG/1
5 TABLET ORAL ONCE
Status: COMPLETED | OUTPATIENT
Start: 2022-12-16 | End: 2022-12-16

## 2022-12-16 RX ORDER — HEPARIN SODIUM (PORCINE) LOCK FLUSH IV SOLN 100 UNIT/ML 100 UNIT/ML
500 SOLUTION INTRAVENOUS AS NEEDED
Status: DISCONTINUED | OUTPATIENT
Start: 2022-12-16 | End: 2022-12-16 | Stop reason: HOSPADM

## 2022-12-16 RX ORDER — DIPHENHYDRAMINE HYDROCHLORIDE 50 MG/ML
50 INJECTION INTRAMUSCULAR; INTRAVENOUS AS NEEDED
Status: CANCELLED | OUTPATIENT
Start: 2022-12-16

## 2022-12-16 RX ORDER — FAMOTIDINE 10 MG/ML
20 INJECTION, SOLUTION INTRAVENOUS AS NEEDED
Status: CANCELLED | OUTPATIENT
Start: 2022-12-16

## 2022-12-16 RX ORDER — SODIUM CHLORIDE 0.9 % (FLUSH) 0.9 %
10 SYRINGE (ML) INJECTION AS NEEDED
Status: CANCELLED | OUTPATIENT
Start: 2022-12-30

## 2022-12-16 RX ORDER — HEPARIN SODIUM (PORCINE) LOCK FLUSH IV SOLN 100 UNIT/ML 100 UNIT/ML
500 SOLUTION INTRAVENOUS AS NEEDED
Status: CANCELLED | OUTPATIENT
Start: 2022-12-30

## 2022-12-16 RX ORDER — OLANZAPINE 5 MG/1
5 TABLET ORAL NIGHTLY
Qty: 6 TABLET | Refills: 5 | Status: SHIPPED | OUTPATIENT
Start: 2022-12-16 | End: 2022-12-30 | Stop reason: SDUPTHER

## 2022-12-16 RX ADMIN — FOSAPREPITANT 100 ML: 150 INJECTION, POWDER, LYOPHILIZED, FOR SOLUTION INTRAVENOUS at 08:56

## 2022-12-16 RX ADMIN — PALONOSETRON HYDROCHLORIDE 0.25 MG: 0.25 INJECTION, SOLUTION INTRAVENOUS at 08:43

## 2022-12-16 RX ADMIN — DEXAMETHASONE SODIUM PHOSPHATE 12 MG: 10 INJECTION, SOLUTION INTRAMUSCULAR; INTRAVENOUS at 10:01

## 2022-12-16 RX ADMIN — DIPHENHYDRAMINE HYDROCHLORIDE 25 MG: 50 INJECTION, SOLUTION INTRAMUSCULAR; INTRAVENOUS at 09:34

## 2022-12-16 RX ADMIN — Medication 10 ML: at 11:47

## 2022-12-16 RX ADMIN — SACITUZUMAB GOVITECAN 540 MG: 180 POWDER, FOR SOLUTION INTRAVENOUS at 10:32

## 2022-12-16 RX ADMIN — FAMOTIDINE 20 MG: 10 INJECTION, SOLUTION INTRAVENOUS at 08:48

## 2022-12-16 RX ADMIN — SODIUM CHLORIDE 250 ML: 9 INJECTION, SOLUTION INTRAVENOUS at 08:43

## 2022-12-16 RX ADMIN — OLANZAPINE 5 MG: 5 TABLET, FILM COATED ORAL at 08:42

## 2022-12-16 RX ADMIN — HEPARIN 500 UNITS: 100 SYRINGE at 11:47

## 2022-12-16 RX ADMIN — ACETAMINOPHEN 650 MG: 325 TABLET ORAL at 09:37

## 2022-12-16 NOTE — TELEPHONE ENCOUNTER
Rx Refill Note  Requested Prescriptions     Pending Prescriptions Disp Refills   • OLANZapine (zyPREXA) 5 MG tablet 6 tablet 5     Sig: Take 1 tablet by mouth Every Night. Take on day 2, 3, and 4 and Days 9, 10, 11 after chemotherapy.      Last office visit with prescribing clinician: 12/16/2022   Last telemedicine visit with prescribing clinician: 12/22/2022   Next office visit with prescribing clinician: Visit date not found                         Would you like a call back once the refill request has been completed: [] Yes [] No    If the office needs to give you a call back, can they leave a voicemail: [] Yes [] No    Nasrin Zuniga RN  12/16/22, 08:46 CST

## 2022-12-19 ENCOUNTER — HOSPITAL ENCOUNTER (OUTPATIENT)
Dept: PET IMAGING | Facility: HOSPITAL | Age: 62
Discharge: HOME | End: 2022-12-19
Admitting: INTERNAL MEDICINE

## 2022-12-19 DIAGNOSIS — T45.1X5A CHEMOTHERAPY-INDUCED PERIPHERAL NEUROPATHY: ICD-10-CM

## 2022-12-19 DIAGNOSIS — C50.919 MALIGNANT NEOPLASM OF FEMALE BREAST, UNSPECIFIED ESTROGEN RECEPTOR STATUS, UNSPECIFIED LATERALITY, UNSPECIFIED SITE OF BREAST: ICD-10-CM

## 2022-12-19 DIAGNOSIS — G89.3 CANCER ASSOCIATED PAIN: ICD-10-CM

## 2022-12-19 DIAGNOSIS — G62.0 CHEMOTHERAPY-INDUCED PERIPHERAL NEUROPATHY: ICD-10-CM

## 2022-12-19 PROCEDURE — A9552 F18 FDG: HCPCS | Performed by: INTERNAL MEDICINE

## 2022-12-19 PROCEDURE — 0 FLUDEOXYGLUCOSE F18 SOLUTION: Performed by: INTERNAL MEDICINE

## 2022-12-19 PROCEDURE — 78815 PET IMAGE W/CT SKULL-THIGH: CPT

## 2022-12-19 RX ORDER — MORPHINE SULFATE 15 MG/1
30 TABLET, FILM COATED, EXTENDED RELEASE ORAL 2 TIMES DAILY
Qty: 90 TABLET | Refills: 0 | Status: SHIPPED | OUTPATIENT
Start: 2022-12-19 | End: 2023-01-11 | Stop reason: SDUPTHER

## 2022-12-19 RX ADMIN — SODIUM FLUORIDE F 18 1 DOSE: 200 INJECTION, SOLUTION INTRAVENOUS at 08:59

## 2022-12-19 NOTE — TELEPHONE ENCOUNTER
Rx Refill Note  Requested Prescriptions     Pending Prescriptions Disp Refills   • Morphine (MS CONTIN) 15 MG 12 hr tablet 90 tablet 0     Sig: Take 2 tablets by mouth 2 (Two) Times a Day.      Last office visit with prescribing clinician: 12/2/2022   Last telemedicine visit with prescribing clinician: 12/22/2022   Next office visit with prescribing clinician: 12/22/2022                         Would you like a call back once the refill request has been completed: [] Yes [] No    If the office needs to give you a call back, can they leave a voicemail: [] Yes [] No    Chaparro Campos Rep  12/19/22, 10:41 CST

## 2022-12-19 NOTE — TELEPHONE ENCOUNTER
Incoming Refill Request      Medication requested (name and dose): Morphine (MS CONTIN) 15 MG 12 hr tablet [[id number]] (Order [id number])        Pharmacy where request should be sent: [facility] - [place], [place] - [address] [place] [address] - [phone]  - [phone] FX   [address] [place] [address]   Phone:  [phone]  Fax:  [phone]    Additional details provided by patient:     Best call back number: [phone]    Does the patient have less than a 3 day supply:  [x] Yes  [] No    Lauren Paredes MA  12/19/22, 10:36 CST

## 2022-12-20 RX ORDER — GABAPENTIN 400 MG/1
CAPSULE ORAL
Qty: 90 CAPSULE | Refills: 0 | Status: SHIPPED | OUTPATIENT
Start: 2022-12-20 | End: 2023-01-16 | Stop reason: SDUPTHER

## 2022-12-20 NOTE — TELEPHONE ENCOUNTER
Rx Refill Note  Requested Prescriptions     Pending Prescriptions Disp Refills   • gabapentin (NEURONTIN) 400 MG capsule [Pharmacy Med Name: GABAPENTIN CAPS 400MG] 90 capsule 0     Sig: TAKE 1 CAPSULE THREE TIMES A DAY      Last office visit with prescribing clinician: 12/2/2022   Last telemedicine visit with prescribing clinician: 12/30/2022   Next office visit with prescribing clinician: 12/22/2022   {TIP  Encounters:23}                      Would you like a call back once the refill request has been completed: [] Yes [] No    If the office needs to give you a call back, can they leave a voicemail: [] Yes [] No    Chaparro Campos Rep  12/20/22, 08:28 CST

## 2022-12-21 NOTE — PROGRESS NOTES
DATE OF VISIT: 12/16/2022      REASON FOR VISIT:  Metastatic breast cancer currently on chemotherapy      HISTORY OF PRESENT ILLNESS:   Pt being seen today in Dr. Troncoso absence  She is due for cycl;e #3 day #8 Sacituzumab govitecan-hziy.  She denies any ongoing nausea, vomiting or diarrhea.  She needs refill on gabapentin and morphine      Past Medical History, Past Surgical History, Social History, Family History have been reviewed and are without significant changes except as mentioned.    Review of Systems   A comprehensive 14 point review of systems was performed and was negative except as mentioned.    Medications:  The current medication list was reviewed in the EMR    ALLERGIES:    Allergies   Allergen Reactions   • Percocet [Oxycodone-Acetaminophen] Nausea Only       Objective      Vitals:    12/16/22 0801   BP: 105/56   Pulse: 79   Resp: 18   Temp: 96.6 °F (35.9 °C)   SpO2: 94%   Weight: 54.9 kg (121 lb)   PainSc: 0-No pain     Current Status 12/9/2022   ECOG score 0       Physical Exam  General: alert and oriented no acute distress  Lungs: normal breath sounds  Card; RRR  Ext; no edema    RECENT LABS:  Glucose   Date Value Ref Range Status   12/16/2022 87 65 - 99 mg/dL Final     Sodium   Date Value Ref Range Status   12/16/2022 142 136 - 145 mmol/L Final     Potassium   Date Value Ref Range Status   12/16/2022 3.7 3.5 - 5.2 mmol/L Final     CO2   Date Value Ref Range Status   12/16/2022 25.0 22.0 - 29.0 mmol/L Final     Chloride   Date Value Ref Range Status   12/16/2022 109 (H) 98 - 107 mmol/L Final     Anion Gap   Date Value Ref Range Status   12/16/2022 8.0 5.0 - 15.0 mmol/L Final     Creatinine   Date Value Ref Range Status   12/16/2022 0.49 (L) 0.57 - 1.00 mg/dL Final     BUN   Date Value Ref Range Status   12/16/2022 13 8 - 23 mg/dL Final     BUN/Creatinine Ratio   Date Value Ref Range Status   12/16/2022 26.5 (H) 7.0 - 25.0 Final     Calcium   Date Value Ref Range Status   12/16/2022 8.9 8.6 - 10.5  mg/dL Final     Alkaline Phosphatase   Date Value Ref Range Status   12/16/2022 68 39 - 117 U/L Final     Total Protein   Date Value Ref Range Status   12/16/2022 6.0 6.0 - 8.5 g/dL Final     ALT (SGPT)   Date Value Ref Range Status   12/16/2022 8 1 - 33 U/L Final     AST (SGOT)   Date Value Ref Range Status   12/16/2022 14 1 - 32 U/L Final     Total Bilirubin   Date Value Ref Range Status   12/16/2022 0.2 0.0 - 1.2 mg/dL Final     Albumin   Date Value Ref Range Status   12/16/2022 3.90 3.50 - 5.20 g/dL Final     Globulin   Date Value Ref Range Status   12/16/2022 2.1 gm/dL Final     Lab Results   Component Value Date    WBC 2.88 (L) 12/16/2022    HGB 9.5 (L) 12/16/2022    HCT 28.8 (L) 12/16/2022    MCV 97.3 (H) 12/16/2022     12/16/2022     Lab Results   Component Value Date    NEUTROABS 1.67 (L) 12/16/2022     No results found for: , LABCA2, AFPTM, HCGQUANT, , CHROMGRNA, [id number], CEA, REFLABREPO                  Assessment & Plan     1. Metastatic breast cancer with mediastinal metastases, Triple negative.  -status post weekly Carboplatin and Paclitaxel 4/22/22 to 7/7/2022 was discontinued due to peripheral neuropathy in LE  -status post Xeloda; was d/c continued due to disease progression   -Started Trodelvy 10/4/2022  -Labs reviewed today and adequate for cycle 3 day 8  -plan is to repeat PET scan before next cycle; will enter order today for this to be done before her next appt with Dr. Troncoso.      2.Cancer associated pain      Chronic, stable.  She is on MS Contin 30 mg twice a day.  She will continue this. New prescription to pharmacy today.     (3) Right upper extremity DVT      Chronic, stable.  She is on Eliquis.  No new concern for bleeding or thrombosis.  Continue with close monitoring     (4) Chemotherapy induce peripheral neuropathy      Chronic, stable.  She is on Neurontin 400 mg 3 times daily.  She will continue this.                   PHQ-9 Total Score: 0     Radha Myrick  Gerald reports a pain score of 0.  Given her pain assessment as noted, treatment options were discussed and the following options were decided upon as a follow-up plan to address the patient's pain: continuation of current treatment plan for pain.         Estefanía Brady, MINNIE  12/21/2022  09:59 CST        Part of this note may be an electronic transcription/translation of spoken language to printed text using the Dragon Dictation System.          CC:

## 2022-12-22 ENCOUNTER — OFFICE VISIT (OUTPATIENT)
Dept: ONCOLOGY | Facility: CLINIC | Age: 62
End: 2022-12-22

## 2022-12-22 VITALS
HEART RATE: 85 BPM | OXYGEN SATURATION: 92 % | WEIGHT: 124 LBS | BODY MASS INDEX: 21.97 KG/M2 | SYSTOLIC BLOOD PRESSURE: 105 MMHG | RESPIRATION RATE: 18 BRPM | DIASTOLIC BLOOD PRESSURE: 64 MMHG

## 2022-12-22 DIAGNOSIS — T45.1X5A CHEMOTHERAPY-INDUCED PERIPHERAL NEUROPATHY: ICD-10-CM

## 2022-12-22 DIAGNOSIS — G62.0 CHEMOTHERAPY-INDUCED PERIPHERAL NEUROPATHY: ICD-10-CM

## 2022-12-22 DIAGNOSIS — C50.919 MALIGNANT NEOPLASM OF FEMALE BREAST, UNSPECIFIED ESTROGEN RECEPTOR STATUS, UNSPECIFIED LATERALITY, UNSPECIFIED SITE OF BREAST: Primary | ICD-10-CM

## 2022-12-22 DIAGNOSIS — G89.3 CANCER ASSOCIATED PAIN: ICD-10-CM

## 2022-12-22 PROCEDURE — G0463 HOSPITAL OUTPT CLINIC VISIT: HCPCS | Performed by: INTERNAL MEDICINE

## 2022-12-22 PROCEDURE — 99214 OFFICE O/P EST MOD 30 MIN: CPT | Performed by: INTERNAL MEDICINE

## 2022-12-26 NOTE — PROGRESS NOTES
Subjective     Radha Duarte was seen in follow-up for metastatic lung cancer, DVT and cancer associated pain.  She is overall stable.  Tolerating chemotherapy well except for fatigue.  Remains on Eliquis.  No bleeding.  Overall pain has been well controlled.  Result of PET scan reviewed.    Past Medical History, Past Surgical History, Social History, Family History have been reviewed and are without significant changes except as mentioned.        Medications:  The current medication list was reviewed in the EMR    ALLERGIES:    Allergies   Allergen Reactions   • Percocet [Oxycodone-Acetaminophen] Nausea Only       Objective      Vitals:    12/22/22 0922   BP: 105/64   Pulse: 85   Resp: 18   SpO2: 92%   Weight: 56.2 kg (124 lb)   PainSc: 0-No pain     Current Status 12/9/2022   ECOG score 0       Physical Exam  Vitals and nursing note reviewed.   Constitutional:       Appearance: Normal appearance.   Musculoskeletal:      Comments: RUE edema + , soft tissue nodules/deposit improved.    Neurological:      General: No focal deficit present.      Mental Status: She is alert and oriented to person, place, and time. Mental status is at baseline.   Psychiatric:         Mood and Affect: Mood normal.         Behavior: Behavior normal.         Thought Content: Thought content normal.               RECENT LABS:Independently reviewed and summarized  Hematology WBC   Date Value Ref Range Status   12/16/2022 2.88 (L) 3.40 - 10.80 10*3/mm3 Final     RBC   Date Value Ref Range Status   12/16/2022 2.96 (L) 3.77 - 5.28 10*6/mm3 Final     Hemoglobin   Date Value Ref Range Status   12/16/2022 9.5 (L) 12.0 - 15.9 g/dL Final     Hematocrit   Date Value Ref Range Status   12/16/2022 28.8 (L) 34.0 - 46.6 % Final     Platelets   Date Value Ref Range Status   12/16/2022 155 140 - 450 10*3/mm3 Final     Lab Results   Component Value Date    GLUCOSE 87 12/16/2022    BUN 13 12/16/2022    CREATININE 0.49 (L) 12/16/2022    BCR 26.5 (H)  12/16/2022    K 3.7 12/16/2022    CO2 25.0 12/16/2022    CALCIUM 8.9 12/16/2022    ALBUMIN 3.90 12/16/2022    AST 14 12/16/2022    ALT 8 12/16/2022          Imaging (independently reviewed and summarized):     NM PET/CT Skull Base to Mid Thigh    Result Date: 12/20/2022  CONCLUSION: No suspicious hypermetabolic activity Electronically signed by:  Edu Pichardo MD  12/20/2022 3:13 PM [facility] Workstation: [id number]    Radha Duarte reports a pain score of 0.  Given her pain assessment as noted, treatment options were discussed and the following options were decided upon as a follow-up plan to address the patient's pain: continuation of current treatment plan for pain.    Patient screened negative for depression based on a PHQ-9 score of 0 on 12/22/2022.          Diagnosis:    (1) Metastatic breast cancer with distant LN (mediastinal) metastases   Triple negative   Unable to do perform foundation one due to limited tissue      Current therapy:   Weekly carboplatin/paclitaxel     (4/22/22- 7/7/22)      Discontinue to pain peripheral neuropathy in lower extremities.      Switch to XELODA (7//22/22)      PET scan on 9/19/22 showed progressive disease, mainly in right breast/chest wall/regional nodes.      Started on trodelvy.   D1C1: 10/4/22      Palliative RT to right breast/LN.      D1C2: 11/4/22  D8C2: 11/11/22   D1C3: 12/9/22  D8C3: 12/16/22     PET scan on 12/19/22 showed favorable response to treatment. BETO.      (2) Cancer associated pain   (3) Right upper extremity DVT   (4) Mucositis   (5) Unintentional weight loss   (6) Chemotherapy induce peripheral neuropathy     Assessment & Plan      (1) Metastatic breast cancer with distant LN (mediastinal) metastases    Chronic, stable.  Patient currently on Trodelvy.  She is feeling well.  Result of PET scan reviewed which showed favorable response to treatment.  We will continue Trodelvy.  I will check CBC, CMP next week prior to day 1 cycle 4.    (2) Cancer  associated pain     Chronic, stable.  She is on MS Contin 30 mg twice a day.  She will continue this.  New prescription sent to the pharmacy.    (3) Right upper extremity DVT     Chronic, stable.  She is on Eliquis.  No new concern for bleeding or thrombosis.  Continue with close monitoring.    (4) Mucositis     Chronic, stable.  Continue Magic mouthwash as needed.    (5) Unintentional weight loss     Chronic, stable.  Recommend high-calorie high-protein diet.    (6) Chemotherapy induce peripheral neuropathy     Chronic, stable.  She is on Neurontin 400 mg 3 times daily.  She will continue       12/26/2022      CC:

## 2022-12-27 ENCOUNTER — TELEPHONE (OUTPATIENT)
Dept: ONCOLOGY | Facility: CLINIC | Age: 62
End: 2022-12-27

## 2022-12-27 DIAGNOSIS — G89.3 CANCER ASSOCIATED PAIN: ICD-10-CM

## 2022-12-27 DIAGNOSIS — G89.3 CANCER ASSOCIATED PAIN: Primary | ICD-10-CM

## 2022-12-27 RX ORDER — POTASSIUM CHLORIDE 20 MEQ/1
TABLET, EXTENDED RELEASE ORAL
Qty: 90 TABLET | Refills: 3 | Status: SHIPPED | OUTPATIENT
Start: 2022-12-27

## 2022-12-27 RX ORDER — MORPHINE SULFATE 15 MG/1
15 TABLET, FILM COATED, EXTENDED RELEASE ORAL 2 TIMES DAILY
Qty: 10 TABLET | Refills: 0 | Status: SHIPPED | OUTPATIENT
Start: 2022-12-27 | End: 2023-01-12

## 2022-12-27 RX ORDER — MORPHINE SULFATE 15 MG/1
30 TABLET, FILM COATED, EXTENDED RELEASE ORAL 2 TIMES DAILY
Qty: 90 TABLET | Refills: 0 | Status: CANCELLED | OUTPATIENT
Start: 2022-12-27

## 2022-12-27 NOTE — TELEPHONE ENCOUNTER
Rx Refill Note  Requested Prescriptions     Pending Prescriptions Disp Refills   • potassium chloride (K-DUR,KLOR-CON) 20 MEQ CR tablet [Pharmacy Med Name: POTASSIUM CHLORIDE ER (DISP) TABS 20MEQ] 90 tablet 3     Sig: TAKE 1 TABLET DAILY      Last office visit with prescribing clinician: 12/22/2022   Last telemedicine visit with prescribing clinician: 12/30/2022   Next office visit with prescribing clinician: 12/30/2022                         Would you like a call back once the refill request has been completed: [] Yes [] No    If the office needs to give you a call back, can they leave a voicemail: [] Yes [] No    Sasha Carolina, [facility] Rep  12/27/22, 07:55 CST

## 2022-12-28 ENCOUNTER — TELEPHONE (OUTPATIENT)
Dept: ONCOLOGY | Facility: CLINIC | Age: 62
End: 2022-12-28

## 2022-12-30 ENCOUNTER — INFUSION (OUTPATIENT)
Dept: ONCOLOGY | Facility: HOSPITAL | Age: 62
End: 2022-12-30

## 2022-12-30 ENCOUNTER — OFFICE VISIT (OUTPATIENT)
Dept: ONCOLOGY | Facility: CLINIC | Age: 62
End: 2022-12-30

## 2022-12-30 VITALS
DIASTOLIC BLOOD PRESSURE: 54 MMHG | OXYGEN SATURATION: 95 % | SYSTOLIC BLOOD PRESSURE: 112 MMHG | TEMPERATURE: 98 F | WEIGHT: 127.3 LBS | RESPIRATION RATE: 18 BRPM | HEART RATE: 76 BPM | BODY MASS INDEX: 22.55 KG/M2

## 2022-12-30 DIAGNOSIS — C50.919 MALIGNANT NEOPLASM OF FEMALE BREAST, UNSPECIFIED ESTROGEN RECEPTOR STATUS, UNSPECIFIED LATERALITY, UNSPECIFIED SITE OF BREAST: Primary | ICD-10-CM

## 2022-12-30 LAB
ALBUMIN SERPL-MCNC: 3.7 G/DL (ref 3.5–5.2)
ALBUMIN/GLOB SERPL: 1.6 G/DL
ALP SERPL-CCNC: 74 U/L (ref 39–117)
ALT SERPL W P-5'-P-CCNC: 8 U/L (ref 1–33)
ANION GAP SERPL CALCULATED.3IONS-SCNC: 8 MMOL/L (ref 5–15)
AST SERPL-CCNC: 16 U/L (ref 1–32)
BASOPHILS # BLD AUTO: 0.03 10*3/MM3 (ref 0–0.2)
BASOPHILS NFR BLD AUTO: 0.8 % (ref 0–1.5)
BILIRUB SERPL-MCNC: 0.2 MG/DL (ref 0–1.2)
BUN SERPL-MCNC: 11 MG/DL (ref 8–23)
BUN/CREAT SERPL: 17.5 (ref 7–25)
CALCIUM SPEC-SCNC: 8.8 MG/DL (ref 8.6–10.5)
CHLORIDE SERPL-SCNC: 108 MMOL/L (ref 98–107)
CO2 SERPL-SCNC: 27 MMOL/L (ref 22–29)
CREAT SERPL-MCNC: 0.63 MG/DL (ref 0.57–1)
DEPRECATED RDW RBC AUTO: 51.5 FL (ref 37–54)
EGFRCR SERPLBLD CKD-EPI 2021: 99.2 ML/MIN/1.73
EOSINOPHIL # BLD AUTO: 0.09 10*3/MM3 (ref 0–0.4)
EOSINOPHIL NFR BLD AUTO: 2.4 % (ref 0.3–6.2)
ERYTHROCYTE [DISTWIDTH] IN BLOOD BY AUTOMATED COUNT: 14.6 % (ref 12.3–15.4)
GLOBULIN UR ELPH-MCNC: 2.3 GM/DL
GLUCOSE SERPL-MCNC: 86 MG/DL (ref 65–99)
HCT VFR BLD AUTO: 30.3 % (ref 34–46.6)
HGB BLD-MCNC: 9.6 G/DL (ref 12–15.9)
HOLD SPECIMEN: NORMAL
IMM GRANULOCYTES # BLD AUTO: 0.01 10*3/MM3 (ref 0–0.05)
IMM GRANULOCYTES NFR BLD AUTO: 0.3 % (ref 0–0.5)
LYMPHOCYTES # BLD AUTO: 0.66 10*3/MM3 (ref 0.7–3.1)
LYMPHOCYTES NFR BLD AUTO: 17.3 % (ref 19.6–45.3)
MCH RBC QN AUTO: 30.4 PG (ref 26.6–33)
MCHC RBC AUTO-ENTMCNC: 31.7 G/DL (ref 31.5–35.7)
MCV RBC AUTO: 95.9 FL (ref 79–97)
MONOCYTES # BLD AUTO: 0.33 10*3/MM3 (ref 0.1–0.9)
MONOCYTES NFR BLD AUTO: 8.6 % (ref 5–12)
NEUTROPHILS NFR BLD AUTO: 2.7 10*3/MM3 (ref 1.7–7)
NEUTROPHILS NFR BLD AUTO: 70.6 % (ref 42.7–76)
NRBC BLD AUTO-RTO: 0 /100 WBC (ref 0–0.2)
PLATELET # BLD AUTO: 129 10*3/MM3 (ref 140–450)
PMV BLD AUTO: 9.3 FL (ref 6–12)
POTASSIUM SERPL-SCNC: 4 MMOL/L (ref 3.5–5.2)
PROT SERPL-MCNC: 6 G/DL (ref 6–8.5)
RBC # BLD AUTO: 3.16 10*6/MM3 (ref 3.77–5.28)
SODIUM SERPL-SCNC: 143 MMOL/L (ref 136–145)
WBC NRBC COR # BLD: 3.82 10*3/MM3 (ref 3.4–10.8)

## 2022-12-30 PROCEDURE — 25010000002 DIPHENHYDRAMINE PER 50 MG: Performed by: INTERNAL MEDICINE

## 2022-12-30 PROCEDURE — 25010000002 PALONOSETRON PER 25 MCG: Performed by: INTERNAL MEDICINE

## 2022-12-30 PROCEDURE — 80053 COMPREHEN METABOLIC PANEL: CPT

## 2022-12-30 PROCEDURE — 25010000002 FOSAPREPITANT PER 1 MG: Performed by: INTERNAL MEDICINE

## 2022-12-30 PROCEDURE — 25010000002 DEXAMETHASONE SODIUM PHOSPHATE 100 MG/10ML SOLUTION: Performed by: INTERNAL MEDICINE

## 2022-12-30 PROCEDURE — 96413 CHEMO IV INFUSION 1 HR: CPT | Performed by: INTERNAL MEDICINE

## 2022-12-30 PROCEDURE — 96367 TX/PROPH/DG ADDL SEQ IV INF: CPT | Performed by: INTERNAL MEDICINE

## 2022-12-30 PROCEDURE — 25010000002 HEPARIN LOCK FLUSH PER 10 UNITS: Performed by: INTERNAL MEDICINE

## 2022-12-30 PROCEDURE — 36415 COLL VENOUS BLD VENIPUNCTURE: CPT

## 2022-12-30 PROCEDURE — 96375 TX/PRO/DX INJ NEW DRUG ADDON: CPT | Performed by: INTERNAL MEDICINE

## 2022-12-30 PROCEDURE — 85025 COMPLETE CBC W/AUTO DIFF WBC: CPT

## 2022-12-30 PROCEDURE — 99214 OFFICE O/P EST MOD 30 MIN: CPT | Performed by: INTERNAL MEDICINE

## 2022-12-30 PROCEDURE — 25010000002 SACITUZUMAB GOVITECAN-HZIY 180 MG RECONSTITUTED SOLUTION 1 EACH VIAL: Performed by: INTERNAL MEDICINE

## 2022-12-30 RX ORDER — PALONOSETRON 0.05 MG/ML
0.25 INJECTION, SOLUTION INTRAVENOUS ONCE
Status: CANCELLED | OUTPATIENT
Start: 2022-12-30

## 2022-12-30 RX ORDER — FAMOTIDINE 10 MG/ML
20 INJECTION, SOLUTION INTRAVENOUS AS NEEDED
Status: CANCELLED | OUTPATIENT
Start: 2022-12-30

## 2022-12-30 RX ORDER — SODIUM CHLORIDE 0.9 % (FLUSH) 0.9 %
10 SYRINGE (ML) INJECTION AS NEEDED
Status: CANCELLED | OUTPATIENT
Start: 2022-12-30

## 2022-12-30 RX ORDER — ATROPINE SULFATE 1 MG/ML
0.25 INJECTION, SOLUTION INTRAMUSCULAR; INTRAVENOUS; SUBCUTANEOUS
Status: CANCELLED | OUTPATIENT
Start: 2022-12-30

## 2022-12-30 RX ORDER — ACETAMINOPHEN 325 MG/1
650 TABLET ORAL ONCE
Status: COMPLETED | OUTPATIENT
Start: 2022-12-30 | End: 2022-12-30

## 2022-12-30 RX ORDER — FAMOTIDINE 10 MG/ML
20 INJECTION, SOLUTION INTRAVENOUS AS NEEDED
Status: DISCONTINUED | OUTPATIENT
Start: 2022-12-30 | End: 2022-12-30 | Stop reason: HOSPADM

## 2022-12-30 RX ORDER — OLANZAPINE 5 MG/1
5 TABLET ORAL NIGHTLY
Qty: 6 TABLET | Refills: 5 | Status: SHIPPED | OUTPATIENT
Start: 2022-12-30 | End: 2023-01-16 | Stop reason: SDUPTHER

## 2022-12-30 RX ORDER — SODIUM CHLORIDE 9 MG/ML
250 INJECTION, SOLUTION INTRAVENOUS ONCE
Status: CANCELLED | OUTPATIENT
Start: 2022-12-30

## 2022-12-30 RX ORDER — DIPHENHYDRAMINE HYDROCHLORIDE 50 MG/ML
50 INJECTION INTRAMUSCULAR; INTRAVENOUS AS NEEDED
Status: CANCELLED | OUTPATIENT
Start: 2022-12-30

## 2022-12-30 RX ORDER — DIPHENHYDRAMINE HYDROCHLORIDE 50 MG/ML
50 INJECTION INTRAMUSCULAR; INTRAVENOUS AS NEEDED
Status: DISCONTINUED | OUTPATIENT
Start: 2022-12-30 | End: 2022-12-30 | Stop reason: HOSPADM

## 2022-12-30 RX ORDER — OLANZAPINE 5 MG/1
5 TABLET ORAL ONCE
Status: COMPLETED | OUTPATIENT
Start: 2022-12-30 | End: 2022-12-30

## 2022-12-30 RX ORDER — HEPARIN SODIUM (PORCINE) LOCK FLUSH IV SOLN 100 UNIT/ML 100 UNIT/ML
500 SOLUTION INTRAVENOUS AS NEEDED
Status: DISCONTINUED | OUTPATIENT
Start: 2022-12-30 | End: 2022-12-30 | Stop reason: HOSPADM

## 2022-12-30 RX ORDER — OLANZAPINE 5 MG/1
5 TABLET ORAL ONCE
Status: CANCELLED | OUTPATIENT
Start: 2022-12-30 | End: 2022-12-30

## 2022-12-30 RX ORDER — HEPARIN SODIUM (PORCINE) LOCK FLUSH IV SOLN 100 UNIT/ML 100 UNIT/ML
500 SOLUTION INTRAVENOUS AS NEEDED
Status: CANCELLED | OUTPATIENT
Start: 2022-12-30

## 2022-12-30 RX ORDER — SODIUM CHLORIDE 0.9 % (FLUSH) 0.9 %
10 SYRINGE (ML) INJECTION AS NEEDED
Status: DISCONTINUED | OUTPATIENT
Start: 2022-12-30 | End: 2022-12-30 | Stop reason: HOSPADM

## 2022-12-30 RX ORDER — ATROPINE SULFATE 1 MG/ML
0.25 INJECTION, SOLUTION INTRAMUSCULAR; INTRAVENOUS; SUBCUTANEOUS
Status: DISCONTINUED | OUTPATIENT
Start: 2022-12-30 | End: 2022-12-30 | Stop reason: HOSPADM

## 2022-12-30 RX ORDER — SODIUM CHLORIDE 9 MG/ML
250 INJECTION, SOLUTION INTRAVENOUS ONCE
Status: COMPLETED | OUTPATIENT
Start: 2022-12-30 | End: 2022-12-30

## 2022-12-30 RX ORDER — FAMOTIDINE 10 MG/ML
20 INJECTION, SOLUTION INTRAVENOUS ONCE
Status: CANCELLED | OUTPATIENT
Start: 2022-12-30

## 2022-12-30 RX ORDER — FAMOTIDINE 10 MG/ML
20 INJECTION, SOLUTION INTRAVENOUS ONCE
Status: COMPLETED | OUTPATIENT
Start: 2022-12-30 | End: 2022-12-30

## 2022-12-30 RX ORDER — PALONOSETRON 0.05 MG/ML
0.25 INJECTION, SOLUTION INTRAVENOUS ONCE
Status: COMPLETED | OUTPATIENT
Start: 2022-12-30 | End: 2022-12-30

## 2022-12-30 RX ORDER — ACETAMINOPHEN 325 MG/1
650 TABLET ORAL ONCE
Status: CANCELLED | OUTPATIENT
Start: 2022-12-30

## 2022-12-30 RX ADMIN — SACITUZUMAB GOVITECAN 540 MG: 180 POWDER, FOR SOLUTION INTRAVENOUS at 10:41

## 2022-12-30 RX ADMIN — OLANZAPINE 5 MG: 5 TABLET, FILM COATED ORAL at 08:42

## 2022-12-30 RX ADMIN — Medication 10 ML: at 12:05

## 2022-12-30 RX ADMIN — DIPHENHYDRAMINE HYDROCHLORIDE 25 MG: 50 INJECTION, SOLUTION INTRAMUSCULAR; INTRAVENOUS at 10:10

## 2022-12-30 RX ADMIN — HEPARIN 500 UNITS: 100 SYRINGE at 12:05

## 2022-12-30 RX ADMIN — FOSAPREPITANT 100 ML: 150 INJECTION, POWDER, LYOPHILIZED, FOR SOLUTION INTRAVENOUS at 08:59

## 2022-12-30 RX ADMIN — SODIUM CHLORIDE 250 ML: 9 INJECTION, SOLUTION INTRAVENOUS at 08:41

## 2022-12-30 RX ADMIN — FAMOTIDINE 20 MG: 10 INJECTION INTRAVENOUS at 08:50

## 2022-12-30 RX ADMIN — DEXAMETHASONE SODIUM PHOSPHATE 12 MG: 10 INJECTION, SOLUTION INTRAMUSCULAR; INTRAVENOUS at 09:42

## 2022-12-30 RX ADMIN — ACETAMINOPHEN 650 MG: 325 TABLET, FILM COATED ORAL at 08:42

## 2022-12-30 RX ADMIN — PALONOSETRON 0.25 MG: 0.05 INJECTION, SOLUTION INTRAVENOUS at 08:42

## 2022-12-30 NOTE — PROGRESS NOTES
Subjective     Radha Duarte was seen in follow-up for metastatic breast cancer.  She is overall stable since last visit.  Denies any new issue.  No fever or chills.  Overall pain has been stable.  Intermittent diarrhea which respond to Imodium.  No nausea or emesis.  Fatigue is stable.    Past Medical History, Past Surgical History, Social History, Family History have been reviewed and are without significant changes except as mentioned.        Medications:  The current medication list was reviewed in the EMR    ALLERGIES:    Allergies   Allergen Reactions   • Percocet [Oxycodone-Acetaminophen] Nausea Only       Objective      Vitals:    12/30/22 0818   BP: 112/54   Pulse: 76   Resp: 18   Temp: 98 °F (36.7 °C)   SpO2: 95%         Current Status 12/9/2022   ECOG score 0       Physical Exam  Vitals and nursing note reviewed.   Constitutional:       Appearance: Normal appearance.   Neurological:      General: No focal deficit present.      Mental Status: She is alert and oriented to person, place, and time. Mental status is at baseline.   Psychiatric:         Mood and Affect: Mood normal.         Behavior: Behavior normal.         Thought Content: Thought content normal.               RECENT LABS:Independently reviewed and summarized  Hematology WBC   Date Value Ref Range Status   12/16/2022 2.88 (L) 3.40 - 10.80 10*3/mm3 Final     RBC   Date Value Ref Range Status   12/16/2022 2.96 (L) 3.77 - 5.28 10*6/mm3 Final     Hemoglobin   Date Value Ref Range Status   12/16/2022 9.5 (L) 12.0 - 15.9 g/dL Final     Hematocrit   Date Value Ref Range Status   12/16/2022 28.8 (L) 34.0 - 46.6 % Final     Platelets   Date Value Ref Range Status   12/16/2022 155 140 - 450 10*3/mm3 Final     WBC   Date Value Ref Range Status   12/30/2022 3.82 3.40 - 10.80 10*3/mm3 Final     RBC   Date Value Ref Range Status   12/30/2022 3.16 (L) 3.77 - 5.28 10*6/mm3 Final     Hemoglobin   Date Value Ref Range Status   12/30/2022 9.6 (L) 12.0  - 15.9 g/dL Final     Hematocrit   Date Value Ref Range Status   12/30/2022 30.3 (L) 34.0 - 46.6 % Final     MCV   Date Value Ref Range Status   12/30/2022 95.9 79.0 - 97.0 fL Final     MCH   Date Value Ref Range Status   12/30/2022 30.4 26.6 - 33.0 pg Final     MCHC   Date Value Ref Range Status   12/30/2022 31.7 31.5 - 35.7 g/dL Final     RDW   Date Value Ref Range Status   12/30/2022 14.6 12.3 - 15.4 % Final     RDW-SD   Date Value Ref Range Status   12/30/2022 51.5 37.0 - 54.0 fl Final     MPV   Date Value Ref Range Status   12/30/2022 9.3 6.0 - 12.0 fL Final     Platelets   Date Value Ref Range Status   12/30/2022 129 (L) 140 - 450 10*3/mm3 Final     Neutrophil %   Date Value Ref Range Status   12/30/2022 70.6 42.7 - 76.0 % Final     Lymphocyte %   Date Value Ref Range Status   12/30/2022 17.3 (L) 19.6 - 45.3 % Final     Monocyte %   Date Value Ref Range Status   12/30/2022 8.6 5.0 - 12.0 % Final     Eosinophil %   Date Value Ref Range Status   12/30/2022 2.4 0.3 - 6.2 % Final     Basophil %   Date Value Ref Range Status   12/30/2022 0.8 0.0 - 1.5 % Final     Immature Grans %   Date Value Ref Range Status   12/30/2022 0.3 0.0 - 0.5 % Final     Neutrophils, Absolute   Date Value Ref Range Status   12/30/2022 2.70 1.70 - 7.00 10*3/mm3 Final     Lymphocytes, Absolute   Date Value Ref Range Status   12/30/2022 0.66 (L) 0.70 - 3.10 10*3/mm3 Final     Monocytes, Absolute   Date Value Ref Range Status   12/30/2022 0.33 0.10 - 0.90 10*3/mm3 Final     Eosinophils, Absolute   Date Value Ref Range Status   12/30/2022 0.09 0.00 - 0.40 10*3/mm3 Final     Basophils, Absolute   Date Value Ref Range Status   12/30/2022 0.03 0.00 - 0.20 10*3/mm3 Final     Immature Grans, Absolute   Date Value Ref Range Status   12/30/2022 0.01 0.00 - 0.05 10*3/mm3 Final     nRBC   Date Value Ref Range Status   12/30/2022 0.0 0.0 - 0.2 /100 WBC Final     Lab Results   Component Value Date    GLUCOSE 86 12/30/2022    BUN 11 12/30/2022     CREATININE 0.63 12/30/2022    BCR 17.5 12/30/2022    K 4.0 12/30/2022    CO2 27.0 12/30/2022    CALCIUM 8.8 12/30/2022    ALBUMIN 3.7 12/30/2022    AST 16 12/30/2022    ALT 8 12/30/2022            Radha Duarte reports a pain score of 0.  Given her pain assessment as noted, treatment options were discussed and the following options were decided upon as a follow-up plan to address the patient's pain: continuation of current treatment plan for pain.    Patient screened negative for depression based on a PHQ-9 score of 0 on 12/30/2022.    Diagnosis:    (1) Metastatic breast cancer with distant LN (mediastinal) metastases   Triple negative   Unable to do perform foundation one due to limited tissue      Current therapy:   Weekly carboplatin/paclitaxel     (4/22/22- 7/7/22)      Discontinue to pain peripheral neuropathy in lower extremities.      Switch to XELODA (7//22/22)      PET scan on 9/19/22 showed progressive disease, mainly in right breast/chest wall/regional nodes.      Started on trodelvy.   D1C1: 10/4/22      Palliative RT to right breast/LN.      D1C2: 11/4/22  D8C2: 11/11/22   D1C3: 12/9/22  D8C3: 12/16/22      PET scan on 12/19/22 showed favorable response to treatment. BETO.     D1C4: 12/30/22      (2) Cancer associated pain   (3) Right upper extremity DVT   (4) Mucositis   (5) Unintentional weight loss   (6) Chemotherapy induce peripheral neuropathy        Assessment & Plan      (1) Metastatic breast cancer with distant LN (mediastinal) metastases    Chronic, stable.  Patient is on Trodelvy.  Result of PET scan reviewed which showed favorable response to treatment.  Her labs look stable.  Trodelvy day 1 cycle 4 was signed on today's visit.  CBC, CMP followed by day 8 cycle 4 next week.  I will plan to see her back in 3 weeks with CBC, CMP.  She is requesting new prescription of Zyprexa which was sent to the pharmacy      (2) Cancer associated pain     Chronic, stable.  She is on MS Contin 30 mg  twice a day.  She will continue this.    (3) Right upper extremity DVT     Chronic, stable.  She is on Eliquis.  No new concern for bleeding or thrombosis.  Continue with close monitoring.    (4) Mucositis     Chronic, stable.  Continue Magic mouthwash as needed    (5) Unintentional weight loss     Chronic, stable.  Recommend high-calorie high-protein diet.    (6) Chemotherapy induce peripheral neuropathy     Chronic, stable.  She is on Neurontin 400 mg 3 times daily.  She will continue this.      12/30/2022      CC:

## 2023-01-03 DIAGNOSIS — C50.919 MALIGNANT NEOPLASM OF FEMALE BREAST, UNSPECIFIED ESTROGEN RECEPTOR STATUS, UNSPECIFIED LATERALITY, UNSPECIFIED SITE OF BREAST: ICD-10-CM

## 2023-01-04 RX ORDER — ONDANSETRON HYDROCHLORIDE 8 MG/1
TABLET, FILM COATED ORAL
Qty: 90 TABLET | Refills: 11 | Status: SHIPPED | OUTPATIENT
Start: 2023-01-04 | End: 2023-01-16 | Stop reason: SDUPTHER

## 2023-01-04 NOTE — TELEPHONE ENCOUNTER
Rx Refill Note  Requested Prescriptions     Pending Prescriptions Disp Refills   • ondansetron (ZOFRAN) 8 MG tablet [Pharmacy Med Name: ONDANSETRON TABS 8MG] 90 tablet 11     Sig: TAKE 1 TABLET EVERY 8 HOURS AS NEEDED FOR VOMITING      Last office visit with prescribing clinician: 12/30/2022   Last telemedicine visit with prescribing clinician: 1/6/2023   Next office visit with prescribing clinician: 1/20/2023                         Would you like a call back once the refill request has been completed: [] Yes [] No    If the office needs to give you a call back, can they leave a voicemail: [] Yes [] No    Migdalia Arnold  01/04/23, 08:35 CST

## 2023-01-06 ENCOUNTER — INFUSION (OUTPATIENT)
Dept: ONCOLOGY | Facility: HOSPITAL | Age: 63
End: 2023-01-06
Payer: OTHER GOVERNMENT

## 2023-01-06 VITALS
SYSTOLIC BLOOD PRESSURE: 129 MMHG | TEMPERATURE: 96.7 F | DIASTOLIC BLOOD PRESSURE: 59 MMHG | RESPIRATION RATE: 18 BRPM | HEART RATE: 85 BPM

## 2023-01-06 DIAGNOSIS — C50.919 MALIGNANT NEOPLASM OF FEMALE BREAST, UNSPECIFIED ESTROGEN RECEPTOR STATUS, UNSPECIFIED LATERALITY, UNSPECIFIED SITE OF BREAST: Primary | ICD-10-CM

## 2023-01-06 LAB
ALBUMIN SERPL-MCNC: 4 G/DL (ref 3.5–5.2)
ALBUMIN/GLOB SERPL: 1.7 G/DL
ALP SERPL-CCNC: 87 U/L (ref 39–117)
ALT SERPL W P-5'-P-CCNC: 10 U/L (ref 1–33)
ANION GAP SERPL CALCULATED.3IONS-SCNC: 10 MMOL/L (ref 5–15)
AST SERPL-CCNC: 15 U/L (ref 1–32)
BASOPHILS # BLD AUTO: 0.02 10*3/MM3 (ref 0–0.2)
BASOPHILS NFR BLD AUTO: 0.6 % (ref 0–1.5)
BILIRUB SERPL-MCNC: 0.3 MG/DL (ref 0–1.2)
BUN SERPL-MCNC: 10 MG/DL (ref 8–23)
BUN/CREAT SERPL: 15.9 (ref 7–25)
CALCIUM SPEC-SCNC: 9 MG/DL (ref 8.6–10.5)
CHLORIDE SERPL-SCNC: 105 MMOL/L (ref 98–107)
CO2 SERPL-SCNC: 26 MMOL/L (ref 22–29)
CREAT SERPL-MCNC: 0.63 MG/DL (ref 0.57–1)
DEPRECATED RDW RBC AUTO: 48.9 FL (ref 37–54)
EGFRCR SERPLBLD CKD-EPI 2021: 99.2 ML/MIN/1.73
EOSINOPHIL # BLD AUTO: 0.09 10*3/MM3 (ref 0–0.4)
EOSINOPHIL NFR BLD AUTO: 2.9 % (ref 0.3–6.2)
ERYTHROCYTE [DISTWIDTH] IN BLOOD BY AUTOMATED COUNT: 14.3 % (ref 12.3–15.4)
GLOBULIN UR ELPH-MCNC: 2.3 GM/DL
GLUCOSE SERPL-MCNC: 107 MG/DL (ref 65–99)
HCT VFR BLD AUTO: 30.8 % (ref 34–46.6)
HGB BLD-MCNC: 10.1 G/DL (ref 12–15.9)
HOLD SPECIMEN: NORMAL
IMM GRANULOCYTES # BLD AUTO: 0.03 10*3/MM3 (ref 0–0.05)
IMM GRANULOCYTES NFR BLD AUTO: 1 % (ref 0–0.5)
LYMPHOCYTES # BLD AUTO: 0.74 10*3/MM3 (ref 0.7–3.1)
LYMPHOCYTES NFR BLD AUTO: 23.7 % (ref 19.6–45.3)
MCH RBC QN AUTO: 31.1 PG (ref 26.6–33)
MCHC RBC AUTO-ENTMCNC: 32.8 G/DL (ref 31.5–35.7)
MCV RBC AUTO: 94.8 FL (ref 79–97)
MONOCYTES # BLD AUTO: 0.35 10*3/MM3 (ref 0.1–0.9)
MONOCYTES NFR BLD AUTO: 11.2 % (ref 5–12)
NEUTROPHILS NFR BLD AUTO: 1.89 10*3/MM3 (ref 1.7–7)
NEUTROPHILS NFR BLD AUTO: 60.6 % (ref 42.7–76)
NRBC BLD AUTO-RTO: 0 /100 WBC (ref 0–0.2)
PLATELET # BLD AUTO: 151 10*3/MM3 (ref 140–450)
PMV BLD AUTO: 9.7 FL (ref 6–12)
POTASSIUM SERPL-SCNC: 4.2 MMOL/L (ref 3.5–5.2)
PROT SERPL-MCNC: 6.3 G/DL (ref 6–8.5)
RBC # BLD AUTO: 3.25 10*6/MM3 (ref 3.77–5.28)
SODIUM SERPL-SCNC: 141 MMOL/L (ref 136–145)
WBC NRBC COR # BLD: 3.12 10*3/MM3 (ref 3.4–10.8)

## 2023-01-06 PROCEDURE — 25010000002 FOSAPREPITANT PER 1 MG: Performed by: INTERNAL MEDICINE

## 2023-01-06 PROCEDURE — 25010000002 PALONOSETRON PER 25 MCG: Performed by: INTERNAL MEDICINE

## 2023-01-06 PROCEDURE — 25010000002 DEXAMETHASONE SODIUM PHOSPHATE 100 MG/10ML SOLUTION: Performed by: INTERNAL MEDICINE

## 2023-01-06 PROCEDURE — 25010000002 SACITUZUMAB GOVITECAN-HZIY 180 MG RECONSTITUTED SOLUTION 1 EACH VIAL: Performed by: INTERNAL MEDICINE

## 2023-01-06 PROCEDURE — 96413 CHEMO IV INFUSION 1 HR: CPT | Performed by: INTERNAL MEDICINE

## 2023-01-06 PROCEDURE — 85025 COMPLETE CBC W/AUTO DIFF WBC: CPT | Performed by: INTERNAL MEDICINE

## 2023-01-06 PROCEDURE — 25010000002 DIPHENHYDRAMINE PER 50 MG: Performed by: INTERNAL MEDICINE

## 2023-01-06 PROCEDURE — 36415 COLL VENOUS BLD VENIPUNCTURE: CPT | Performed by: INTERNAL MEDICINE

## 2023-01-06 PROCEDURE — 96375 TX/PRO/DX INJ NEW DRUG ADDON: CPT | Performed by: INTERNAL MEDICINE

## 2023-01-06 PROCEDURE — 80053 COMPREHEN METABOLIC PANEL: CPT | Performed by: INTERNAL MEDICINE

## 2023-01-06 PROCEDURE — 25010000002 HEPARIN LOCK FLUSH PER 10 UNITS: Performed by: INTERNAL MEDICINE

## 2023-01-06 PROCEDURE — 96367 TX/PROPH/DG ADDL SEQ IV INF: CPT | Performed by: INTERNAL MEDICINE

## 2023-01-06 RX ORDER — ATROPINE SULFATE 1 MG/ML
0.25 INJECTION, SOLUTION INTRAMUSCULAR; INTRAVENOUS; SUBCUTANEOUS
Status: DISCONTINUED | OUTPATIENT
Start: 2023-01-06 | End: 2023-01-06 | Stop reason: HOSPADM

## 2023-01-06 RX ORDER — ATROPINE SULFATE 1 MG/ML
0.25 INJECTION, SOLUTION INTRAMUSCULAR; INTRAVENOUS; SUBCUTANEOUS
Status: CANCELLED | OUTPATIENT
Start: 2023-01-06

## 2023-01-06 RX ORDER — FAMOTIDINE 10 MG/ML
20 INJECTION, SOLUTION INTRAVENOUS AS NEEDED
Status: CANCELLED | OUTPATIENT
Start: 2023-01-06

## 2023-01-06 RX ORDER — PALONOSETRON 0.05 MG/ML
0.25 INJECTION, SOLUTION INTRAVENOUS ONCE
Status: COMPLETED | OUTPATIENT
Start: 2023-01-06 | End: 2023-01-06

## 2023-01-06 RX ORDER — SODIUM CHLORIDE 9 MG/ML
250 INJECTION, SOLUTION INTRAVENOUS ONCE
Status: COMPLETED | OUTPATIENT
Start: 2023-01-06 | End: 2023-01-06

## 2023-01-06 RX ORDER — HEPARIN SODIUM (PORCINE) LOCK FLUSH IV SOLN 100 UNIT/ML 100 UNIT/ML
500 SOLUTION INTRAVENOUS AS NEEDED
Status: CANCELLED | OUTPATIENT
Start: 2023-01-06

## 2023-01-06 RX ORDER — SODIUM CHLORIDE 9 MG/ML
250 INJECTION, SOLUTION INTRAVENOUS ONCE
Status: CANCELLED | OUTPATIENT
Start: 2023-01-06

## 2023-01-06 RX ORDER — ACETAMINOPHEN 325 MG/1
650 TABLET ORAL ONCE
Status: COMPLETED | OUTPATIENT
Start: 2023-01-06 | End: 2023-01-06

## 2023-01-06 RX ORDER — DIPHENHYDRAMINE HYDROCHLORIDE 50 MG/ML
50 INJECTION INTRAMUSCULAR; INTRAVENOUS AS NEEDED
Status: DISCONTINUED | OUTPATIENT
Start: 2023-01-06 | End: 2023-01-06 | Stop reason: HOSPADM

## 2023-01-06 RX ORDER — HEPARIN SODIUM (PORCINE) LOCK FLUSH IV SOLN 100 UNIT/ML 100 UNIT/ML
500 SOLUTION INTRAVENOUS AS NEEDED
Status: DISCONTINUED | OUTPATIENT
Start: 2023-01-06 | End: 2023-01-06 | Stop reason: HOSPADM

## 2023-01-06 RX ORDER — DIPHENHYDRAMINE HYDROCHLORIDE 50 MG/ML
50 INJECTION INTRAMUSCULAR; INTRAVENOUS AS NEEDED
Status: CANCELLED | OUTPATIENT
Start: 2023-01-06

## 2023-01-06 RX ORDER — SODIUM CHLORIDE 0.9 % (FLUSH) 0.9 %
10 SYRINGE (ML) INJECTION AS NEEDED
Status: DISCONTINUED | OUTPATIENT
Start: 2023-01-06 | End: 2023-01-06 | Stop reason: HOSPADM

## 2023-01-06 RX ORDER — OLANZAPINE 5 MG/1
5 TABLET ORAL ONCE
Status: CANCELLED | OUTPATIENT
Start: 2023-01-06 | End: 2023-01-06

## 2023-01-06 RX ORDER — SODIUM CHLORIDE 0.9 % (FLUSH) 0.9 %
10 SYRINGE (ML) INJECTION AS NEEDED
Status: CANCELLED | OUTPATIENT
Start: 2023-01-06

## 2023-01-06 RX ORDER — ACETAMINOPHEN 325 MG/1
650 TABLET ORAL ONCE
Status: CANCELLED | OUTPATIENT
Start: 2023-01-06

## 2023-01-06 RX ORDER — FAMOTIDINE 10 MG/ML
20 INJECTION, SOLUTION INTRAVENOUS AS NEEDED
Status: DISCONTINUED | OUTPATIENT
Start: 2023-01-06 | End: 2023-01-06 | Stop reason: HOSPADM

## 2023-01-06 RX ORDER — FAMOTIDINE 10 MG/ML
20 INJECTION, SOLUTION INTRAVENOUS ONCE
Status: COMPLETED | OUTPATIENT
Start: 2023-01-06 | End: 2023-01-06

## 2023-01-06 RX ORDER — OLANZAPINE 5 MG/1
5 TABLET ORAL ONCE
Status: COMPLETED | OUTPATIENT
Start: 2023-01-06 | End: 2023-01-06

## 2023-01-06 RX ORDER — PALONOSETRON 0.05 MG/ML
0.25 INJECTION, SOLUTION INTRAVENOUS ONCE
Status: CANCELLED | OUTPATIENT
Start: 2023-01-06

## 2023-01-06 RX ORDER — FAMOTIDINE 10 MG/ML
20 INJECTION, SOLUTION INTRAVENOUS ONCE
Status: CANCELLED | OUTPATIENT
Start: 2023-01-06

## 2023-01-06 RX ADMIN — SACITUZUMAB GOVITECAN 442.4 MG: 180 POWDER, FOR SOLUTION INTRAVENOUS at 11:36

## 2023-01-06 RX ADMIN — SODIUM CHLORIDE 250 ML: 9 INJECTION, SOLUTION INTRAVENOUS at 09:01

## 2023-01-06 RX ADMIN — Medication 10 ML: at 12:55

## 2023-01-06 RX ADMIN — DIPHENHYDRAMINE HYDROCHLORIDE 25 MG: 50 INJECTION, SOLUTION INTRAMUSCULAR; INTRAVENOUS at 10:24

## 2023-01-06 RX ADMIN — DEXAMETHASONE SODIUM PHOSPHATE 12 MG: 10 INJECTION, SOLUTION INTRAMUSCULAR; INTRAVENOUS at 10:57

## 2023-01-06 RX ADMIN — PALONOSETRON 0.25 MG: 0.05 INJECTION, SOLUTION INTRAVENOUS at 08:56

## 2023-01-06 RX ADMIN — HEPARIN 500 UNITS: 100 SYRINGE at 12:55

## 2023-01-06 RX ADMIN — OLANZAPINE 5 MG: 5 TABLET, FILM COATED ORAL at 08:56

## 2023-01-06 RX ADMIN — FAMOTIDINE 20 MG: 10 INJECTION INTRAVENOUS at 11:30

## 2023-01-06 RX ADMIN — ACETAMINOPHEN 650 MG: 325 TABLET ORAL at 10:23

## 2023-01-06 RX ADMIN — FOSAPREPITANT 100 ML: 150 INJECTION, POWDER, LYOPHILIZED, FOR SOLUTION INTRAVENOUS at 09:28

## 2023-01-11 DIAGNOSIS — G89.3 CANCER ASSOCIATED PAIN: ICD-10-CM

## 2023-01-11 RX ORDER — MORPHINE SULFATE 15 MG/1
30 TABLET, FILM COATED, EXTENDED RELEASE ORAL 2 TIMES DAILY
Qty: 90 TABLET | Refills: 0 | Status: SHIPPED | OUTPATIENT
Start: 2023-01-11 | End: 2023-01-13 | Stop reason: SDUPTHER

## 2023-01-11 NOTE — TELEPHONE ENCOUNTER
Incoming Refill Request      Medication requested (name and dose): Morphine    Pharmacy where request should be sent: [facility]    Additional details provided by patient: Patient said dosage is 15 mg. Tablet twice daily - said she has a 13 day supply  Best call back number: [phone]    Does the patient have less than a 3 day supply:  [] Yes  [x] No    Chaparro Hsieh Rep  01/11/23, 09:58 CST

## 2023-01-11 NOTE — TELEPHONE ENCOUNTER
Rx Refill Note  Requested Prescriptions     Pending Prescriptions Disp Refills   • Morphine (MS CONTIN) 15 MG 12 hr tablet 90 tablet 0     Sig: Take 2 tablets by mouth 2 (Two) Times a Day.      Last office visit with prescribing clinician: 12/30/2022   Last telemedicine visit with prescribing clinician: 1/20/2023   Next office visit with prescribing clinician: 1/20/2023                         Would you like a call back once the refill request has been completed: [] Yes [] No    If the office needs to give you a call back, can they leave a voicemail: [] Yes [] No    Sasha Carolina, [facility] Rep  01/11/23, 10:17 CST

## 2023-01-12 ENCOUNTER — OFFICE VISIT (OUTPATIENT)
Dept: FAMILY MEDICINE CLINIC | Facility: CLINIC | Age: 63
End: 2023-01-12
Payer: OTHER GOVERNMENT

## 2023-01-12 VITALS
OXYGEN SATURATION: 96 % | HEART RATE: 106 BPM | TEMPERATURE: 99.7 F | DIASTOLIC BLOOD PRESSURE: 82 MMHG | RESPIRATION RATE: 18 BRPM | SYSTOLIC BLOOD PRESSURE: 130 MMHG | HEIGHT: 63 IN | BODY MASS INDEX: 21.97 KG/M2 | WEIGHT: 124 LBS

## 2023-01-12 DIAGNOSIS — Z00.00 ANNUAL PHYSICAL EXAM: ICD-10-CM

## 2023-01-12 DIAGNOSIS — T45.1X5A CHEMOTHERAPY-INDUCED PERIPHERAL NEUROPATHY: ICD-10-CM

## 2023-01-12 DIAGNOSIS — M81.0 OSTEOPOROSIS WITHOUT CURRENT PATHOLOGICAL FRACTURE, UNSPECIFIED OSTEOPOROSIS TYPE: ICD-10-CM

## 2023-01-12 DIAGNOSIS — G62.0 CHEMOTHERAPY-INDUCED PERIPHERAL NEUROPATHY: ICD-10-CM

## 2023-01-12 DIAGNOSIS — G89.3 CANCER ASSOCIATED PAIN: ICD-10-CM

## 2023-01-12 DIAGNOSIS — C50.919 MALIGNANT NEOPLASM OF FEMALE BREAST, UNSPECIFIED ESTROGEN RECEPTOR STATUS, UNSPECIFIED LATERALITY, UNSPECIFIED SITE OF BREAST: ICD-10-CM

## 2023-01-12 DIAGNOSIS — Z11.59 NEED FOR HEPATITIS C SCREENING TEST: ICD-10-CM

## 2023-01-12 DIAGNOSIS — E78.2 MIXED HYPERLIPIDEMIA: ICD-10-CM

## 2023-01-12 DIAGNOSIS — Z76.89 ENCOUNTER TO ESTABLISH CARE: Primary | ICD-10-CM

## 2023-01-12 DIAGNOSIS — D64.89 ANEMIA DUE TO OTHER CAUSE, NOT CLASSIFIED: ICD-10-CM

## 2023-01-12 PROBLEM — C80.1 CANCER: Status: ACTIVE | Noted: 2022-03-01

## 2023-01-12 PROCEDURE — 99214 OFFICE O/P EST MOD 30 MIN: CPT | Performed by: NURSE PRACTITIONER

## 2023-01-12 RX ORDER — ATORVASTATIN CALCIUM 20 MG/1
20 TABLET, FILM COATED ORAL DAILY
COMMUNITY
End: 2023-01-12 | Stop reason: SDUPTHER

## 2023-01-12 RX ORDER — ATORVASTATIN CALCIUM 20 MG/1
20 TABLET, FILM COATED ORAL DAILY
Qty: 90 TABLET | Refills: 3 | Status: SHIPPED | OUTPATIENT
Start: 2023-01-12

## 2023-01-12 RX ORDER — ACETAMINOPHEN 500 MG
500 TABLET ORAL EVERY 6 HOURS PRN
COMMUNITY

## 2023-01-12 RX ORDER — IBANDRONATE SODIUM 150 MG/1
150 TABLET, FILM COATED ORAL
COMMUNITY
End: 2023-01-12 | Stop reason: SDUPTHER

## 2023-01-12 RX ORDER — IBANDRONATE SODIUM 150 MG/1
150 TABLET, FILM COATED ORAL
Qty: 6 TABLET | Refills: 1 | Status: SHIPPED | OUTPATIENT
Start: 2023-01-12

## 2023-01-13 DIAGNOSIS — G89.3 CANCER ASSOCIATED PAIN: ICD-10-CM

## 2023-01-13 RX ORDER — MORPHINE SULFATE 15 MG/1
30 TABLET, FILM COATED, EXTENDED RELEASE ORAL 2 TIMES DAILY
Qty: 90 TABLET | Refills: 0 | Status: SHIPPED | OUTPATIENT
Start: 2023-01-13 | End: 2023-01-16 | Stop reason: SDUPTHER

## 2023-01-13 NOTE — TELEPHONE ENCOUNTER
Rx Refill Note  Requested Prescriptions     Pending Prescriptions Disp Refills   • Morphine (MS CONTIN) 15 MG 12 hr tablet 90 tablet 0     Sig: Take 2 tablets by mouth 2 (Two) Times a Day.      Last office visit with prescribing clinician: 12/30/2022   Last telemedicine visit with prescribing clinician: 1/20/2023   Next office visit with prescribing clinician: 1/20/2023                         Would you like a call back once the refill request has been completed: [] Yes [] No    If the office needs to give you a call back, can they leave a voicemail: [] Yes [] No    Nasrin Zuniga RN  01/13/23, 09:05 CST

## 2023-01-16 DIAGNOSIS — G62.0 CHEMOTHERAPY-INDUCED PERIPHERAL NEUROPATHY: ICD-10-CM

## 2023-01-16 DIAGNOSIS — C50.919 MALIGNANT NEOPLASM OF FEMALE BREAST, UNSPECIFIED ESTROGEN RECEPTOR STATUS, UNSPECIFIED LATERALITY, UNSPECIFIED SITE OF BREAST: ICD-10-CM

## 2023-01-16 DIAGNOSIS — T45.1X5A CHEMOTHERAPY-INDUCED PERIPHERAL NEUROPATHY: ICD-10-CM

## 2023-01-16 DIAGNOSIS — G89.3 CANCER ASSOCIATED PAIN: ICD-10-CM

## 2023-01-16 RX ORDER — PROMETHAZINE HYDROCHLORIDE 12.5 MG/1
12.5 TABLET ORAL EVERY 6 HOURS PRN
Qty: 90 TABLET | Refills: 14 | Status: SHIPPED | OUTPATIENT
Start: 2023-01-16 | End: 2023-01-20 | Stop reason: SDUPTHER

## 2023-01-16 RX ORDER — MORPHINE SULFATE 15 MG/1
30 TABLET, FILM COATED, EXTENDED RELEASE ORAL 2 TIMES DAILY
Qty: 90 TABLET | Refills: 0 | Status: SHIPPED | OUTPATIENT
Start: 2023-01-16 | End: 2023-02-06 | Stop reason: SDUPTHER

## 2023-01-16 RX ORDER — OLANZAPINE 5 MG/1
5 TABLET ORAL NIGHTLY
Qty: 6 TABLET | Refills: 5 | Status: SHIPPED | OUTPATIENT
Start: 2023-01-16 | End: 2023-01-20 | Stop reason: SDUPTHER

## 2023-01-16 RX ORDER — GABAPENTIN 400 MG/1
400 CAPSULE ORAL 3 TIMES DAILY
Qty: 270 CAPSULE | Refills: 0 | Status: SHIPPED | OUTPATIENT
Start: 2023-01-16

## 2023-01-16 RX ORDER — ONDANSETRON HYDROCHLORIDE 8 MG/1
8 TABLET, FILM COATED ORAL EVERY 8 HOURS PRN
Qty: 90 TABLET | Refills: 11 | Status: SHIPPED | OUTPATIENT
Start: 2023-01-16 | End: 2023-01-20 | Stop reason: SDUPTHER

## 2023-01-16 NOTE — TELEPHONE ENCOUNTER
Rx Refill Note  Requested Prescriptions     Pending Prescriptions Disp Refills   • Morphine (MS CONTIN) 15 MG 12 hr tablet 90 tablet 0     Sig: Take 2 tablets by mouth 2 (Two) Times a Day.      Last office visit with prescribing clinician: 12/30/2022   Last telemedicine visit with prescribing clinician: 1/20/2023   Next office visit with prescribing clinician: 1/20/2023                         Would you like a call back once the refill request has been completed: [] Yes [] No    If the office needs to give you a call back, can they leave a voicemail: [] Yes [] No    Nasrin Zuniga RN  01/16/23, 14:18 CST

## 2023-01-16 NOTE — TELEPHONE ENCOUNTER
Patient called needing to see if she can get a written prescription for her Morphine to [facility]. [facility] is out of stock. She is also wanting to know if she can get a 90 day supply on her     OLANZapine (zyPREXA) 5 MG tablet [[id number]] (Order [id number])    ondansetron (ZOFRAN) 8 MG tablet [[id number]] (Order [id number])    gabapentin (NEURONTIN) 400 MG capsule [[id number]] (Order [id number])    promethazine (PHENERGAN) 12.5 MG tablet [6621] (Order [id number])    Morphine (MS CONTIN) 15 MG 12 hr tablet [[id number]] (Order [id number])    Sent into     [facility] - [place], [place] - [address] [place] [address] - [phone]  - [phone] [address] [address]   Phone:  [phone]  Fax:  [phone]

## 2023-01-16 NOTE — TELEPHONE ENCOUNTER
Rx Refill Note  Requested Prescriptions     Pending Prescriptions Disp Refills   • gabapentin (NEURONTIN) 400 MG capsule 90 capsule 0     Sig: Take 1 capsule by mouth 3 (Three) Times a Day.   • OLANZapine (zyPREXA) 5 MG tablet 6 tablet 5     Sig: Take 1 tablet by mouth Every Night. Take on day 2, 3, and 4 and Days 9, 10, 11 after chemotherapy.   • ondansetron (ZOFRAN) 8 MG tablet 90 tablet 11   • promethazine (PHENERGAN) 12.5 MG tablet 90 tablet 14      Last office visit with prescribing clinician: 12/30/2022   Last telemedicine visit with prescribing clinician: 1/20/2023   Next office visit with prescribing clinician: 1/20/2023                         Would you like a call back once the refill request has been completed: [] Yes [] No    If the office needs to give you a call back, can they leave a voicemail: [] Yes [] No    Nasrin Zuniga RN  01/16/23, 15:01 CST

## 2023-01-20 ENCOUNTER — INFUSION (OUTPATIENT)
Dept: ONCOLOGY | Facility: HOSPITAL | Age: 63
End: 2023-01-20
Payer: OTHER GOVERNMENT

## 2023-01-20 ENCOUNTER — OFFICE VISIT (OUTPATIENT)
Dept: ONCOLOGY | Facility: CLINIC | Age: 63
End: 2023-01-20
Payer: OTHER GOVERNMENT

## 2023-01-20 VITALS
HEART RATE: 82 BPM | OXYGEN SATURATION: 96 % | BODY MASS INDEX: 22.32 KG/M2 | SYSTOLIC BLOOD PRESSURE: 111 MMHG | WEIGHT: 126 LBS | RESPIRATION RATE: 18 BRPM | DIASTOLIC BLOOD PRESSURE: 57 MMHG

## 2023-01-20 DIAGNOSIS — I82.A12 ACUTE DEEP VEIN THROMBOSIS (DVT) OF AXILLARY VEIN OF LEFT UPPER EXTREMITY: ICD-10-CM

## 2023-01-20 DIAGNOSIS — G62.0 CHEMOTHERAPY-INDUCED PERIPHERAL NEUROPATHY: ICD-10-CM

## 2023-01-20 DIAGNOSIS — C50.919 MALIGNANT NEOPLASM OF FEMALE BREAST, UNSPECIFIED ESTROGEN RECEPTOR STATUS, UNSPECIFIED LATERALITY, UNSPECIFIED SITE OF BREAST: Primary | ICD-10-CM

## 2023-01-20 DIAGNOSIS — T45.1X5A CHEMOTHERAPY-INDUCED PERIPHERAL NEUROPATHY: ICD-10-CM

## 2023-01-20 LAB
ALBUMIN SERPL-MCNC: 3.4 G/DL (ref 3.5–5.2)
ALBUMIN/GLOB SERPL: 1.4 G/DL
ALP SERPL-CCNC: 78 U/L (ref 39–117)
ALT SERPL W P-5'-P-CCNC: 7 U/L (ref 1–33)
ANION GAP SERPL CALCULATED.3IONS-SCNC: 8 MMOL/L (ref 5–15)
AST SERPL-CCNC: 13 U/L (ref 1–32)
BASOPHILS # BLD AUTO: 0.02 10*3/MM3 (ref 0–0.2)
BASOPHILS NFR BLD AUTO: 0.7 % (ref 0–1.5)
BILIRUB SERPL-MCNC: 0.2 MG/DL (ref 0–1.2)
BUN SERPL-MCNC: 11 MG/DL (ref 8–23)
BUN/CREAT SERPL: 14.1 (ref 7–25)
CALCIUM SPEC-SCNC: 9 MG/DL (ref 8.6–10.5)
CHLORIDE SERPL-SCNC: 105 MMOL/L (ref 98–107)
CO2 SERPL-SCNC: 27 MMOL/L (ref 22–29)
CREAT SERPL-MCNC: 0.78 MG/DL (ref 0.57–1)
DEPRECATED RDW RBC AUTO: 50.9 FL (ref 37–54)
EGFRCR SERPLBLD CKD-EPI 2021: 84.9 ML/MIN/1.73
EOSINOPHIL # BLD AUTO: 0.04 10*3/MM3 (ref 0–0.4)
EOSINOPHIL NFR BLD AUTO: 1.3 % (ref 0.3–6.2)
ERYTHROCYTE [DISTWIDTH] IN BLOOD BY AUTOMATED COUNT: 14.5 % (ref 12.3–15.4)
GLOBULIN UR ELPH-MCNC: 2.4 GM/DL
GLUCOSE SERPL-MCNC: 117 MG/DL (ref 65–99)
HCT VFR BLD AUTO: 30 % (ref 34–46.6)
HGB BLD-MCNC: 9.5 G/DL (ref 12–15.9)
HOLD SPECIMEN: NORMAL
IMM GRANULOCYTES # BLD AUTO: 0.01 10*3/MM3 (ref 0–0.05)
IMM GRANULOCYTES NFR BLD AUTO: 0.3 % (ref 0–0.5)
LYMPHOCYTES # BLD AUTO: 0.53 10*3/MM3 (ref 0.7–3.1)
LYMPHOCYTES NFR BLD AUTO: 17.5 % (ref 19.6–45.3)
MCH RBC QN AUTO: 30.6 PG (ref 26.6–33)
MCHC RBC AUTO-ENTMCNC: 31.7 G/DL (ref 31.5–35.7)
MCV RBC AUTO: 96.8 FL (ref 79–97)
MONOCYTES # BLD AUTO: 0.39 10*3/MM3 (ref 0.1–0.9)
MONOCYTES NFR BLD AUTO: 12.9 % (ref 5–12)
NEUTROPHILS NFR BLD AUTO: 2.03 10*3/MM3 (ref 1.7–7)
NEUTROPHILS NFR BLD AUTO: 67.3 % (ref 42.7–76)
NRBC BLD AUTO-RTO: 0 /100 WBC (ref 0–0.2)
PLATELET # BLD AUTO: 156 10*3/MM3 (ref 140–450)
PMV BLD AUTO: 9.5 FL (ref 6–12)
POTASSIUM SERPL-SCNC: 3.9 MMOL/L (ref 3.5–5.2)
PROT SERPL-MCNC: 5.8 G/DL (ref 6–8.5)
RBC # BLD AUTO: 3.1 10*6/MM3 (ref 3.77–5.28)
SODIUM SERPL-SCNC: 140 MMOL/L (ref 136–145)
WBC NRBC COR # BLD: 3.02 10*3/MM3 (ref 3.4–10.8)

## 2023-01-20 PROCEDURE — 25010000002 HEPARIN LOCK FLUSH PER 10 UNITS: Performed by: INTERNAL MEDICINE

## 2023-01-20 PROCEDURE — 36415 COLL VENOUS BLD VENIPUNCTURE: CPT

## 2023-01-20 PROCEDURE — 96375 TX/PRO/DX INJ NEW DRUG ADDON: CPT | Performed by: INTERNAL MEDICINE

## 2023-01-20 PROCEDURE — 25010000002 PALONOSETRON PER 25 MCG: Performed by: INTERNAL MEDICINE

## 2023-01-20 PROCEDURE — 25010000002 DIPHENHYDRAMINE PER 50 MG: Performed by: INTERNAL MEDICINE

## 2023-01-20 PROCEDURE — 85025 COMPLETE CBC W/AUTO DIFF WBC: CPT

## 2023-01-20 PROCEDURE — 25010000002 FOSAPREPITANT PER 1 MG: Performed by: INTERNAL MEDICINE

## 2023-01-20 PROCEDURE — 25010000002 DEXAMETHASONE SODIUM PHOSPHATE 100 MG/10ML SOLUTION: Performed by: INTERNAL MEDICINE

## 2023-01-20 PROCEDURE — 96413 CHEMO IV INFUSION 1 HR: CPT | Performed by: INTERNAL MEDICINE

## 2023-01-20 PROCEDURE — 99214 OFFICE O/P EST MOD 30 MIN: CPT | Performed by: INTERNAL MEDICINE

## 2023-01-20 PROCEDURE — 96367 TX/PROPH/DG ADDL SEQ IV INF: CPT | Performed by: INTERNAL MEDICINE

## 2023-01-20 PROCEDURE — 80053 COMPREHEN METABOLIC PANEL: CPT

## 2023-01-20 PROCEDURE — 25010000002 SACITUZUMAB GOVITECAN-HZIY 180 MG RECONSTITUTED SOLUTION 1 EACH VIAL: Performed by: INTERNAL MEDICINE

## 2023-01-20 RX ORDER — FAMOTIDINE 10 MG/ML
20 INJECTION, SOLUTION INTRAVENOUS AS NEEDED
Status: DISCONTINUED | OUTPATIENT
Start: 2023-01-20 | End: 2023-01-20 | Stop reason: HOSPADM

## 2023-01-20 RX ORDER — FAMOTIDINE 10 MG/ML
20 INJECTION, SOLUTION INTRAVENOUS ONCE
Status: CANCELLED | OUTPATIENT
Start: 2023-01-20

## 2023-01-20 RX ORDER — PALONOSETRON 0.05 MG/ML
0.25 INJECTION, SOLUTION INTRAVENOUS ONCE
Status: COMPLETED | OUTPATIENT
Start: 2023-01-20 | End: 2023-01-20

## 2023-01-20 RX ORDER — OLANZAPINE 5 MG/1
5 TABLET ORAL ONCE
Status: CANCELLED | OUTPATIENT
Start: 2023-01-20 | End: 2023-01-20

## 2023-01-20 RX ORDER — OLANZAPINE 5 MG/1
5 TABLET ORAL ONCE
Status: COMPLETED | OUTPATIENT
Start: 2023-01-20 | End: 2023-01-20

## 2023-01-20 RX ORDER — ACETAMINOPHEN 325 MG/1
650 TABLET ORAL ONCE
Status: CANCELLED | OUTPATIENT
Start: 2023-01-20

## 2023-01-20 RX ORDER — PROMETHAZINE HYDROCHLORIDE 12.5 MG/1
12.5 TABLET ORAL EVERY 6 HOURS PRN
Qty: 360 TABLET | Refills: 0 | Status: SHIPPED | OUTPATIENT
Start: 2023-01-20 | End: 2023-03-29

## 2023-01-20 RX ORDER — SODIUM CHLORIDE 0.9 % (FLUSH) 0.9 %
10 SYRINGE (ML) INJECTION AS NEEDED
Status: CANCELLED | OUTPATIENT
Start: 2023-01-20

## 2023-01-20 RX ORDER — FAMOTIDINE 10 MG/ML
20 INJECTION, SOLUTION INTRAVENOUS ONCE
Status: COMPLETED | OUTPATIENT
Start: 2023-01-20 | End: 2023-01-20

## 2023-01-20 RX ORDER — PALONOSETRON 0.05 MG/ML
0.25 INJECTION, SOLUTION INTRAVENOUS ONCE
Status: CANCELLED | OUTPATIENT
Start: 2023-01-20

## 2023-01-20 RX ORDER — ACETAMINOPHEN 325 MG/1
650 TABLET ORAL ONCE
Status: COMPLETED | OUTPATIENT
Start: 2023-01-20 | End: 2023-01-20

## 2023-01-20 RX ORDER — HEPARIN SODIUM (PORCINE) LOCK FLUSH IV SOLN 100 UNIT/ML 100 UNIT/ML
500 SOLUTION INTRAVENOUS AS NEEDED
Status: DISCONTINUED | OUTPATIENT
Start: 2023-01-20 | End: 2023-01-20 | Stop reason: HOSPADM

## 2023-01-20 RX ORDER — ONDANSETRON HYDROCHLORIDE 8 MG/1
8 TABLET, FILM COATED ORAL EVERY 8 HOURS PRN
Qty: 270 TABLET | Refills: 0 | Status: SHIPPED | OUTPATIENT
Start: 2023-01-20

## 2023-01-20 RX ORDER — ATROPINE SULFATE 1 MG/ML
0.25 INJECTION, SOLUTION INTRAMUSCULAR; INTRAVENOUS; SUBCUTANEOUS
Status: CANCELLED | OUTPATIENT
Start: 2023-01-20

## 2023-01-20 RX ORDER — SODIUM CHLORIDE 9 MG/ML
250 INJECTION, SOLUTION INTRAVENOUS ONCE
Status: CANCELLED | OUTPATIENT
Start: 2023-01-20

## 2023-01-20 RX ORDER — OLANZAPINE 5 MG/1
5 TABLET ORAL NIGHTLY
Qty: 50 TABLET | Refills: 0 | Status: SHIPPED | OUTPATIENT
Start: 2023-01-20 | End: 2023-03-29

## 2023-01-20 RX ORDER — HEPARIN SODIUM (PORCINE) LOCK FLUSH IV SOLN 100 UNIT/ML 100 UNIT/ML
500 SOLUTION INTRAVENOUS AS NEEDED
Status: CANCELLED | OUTPATIENT
Start: 2023-01-20

## 2023-01-20 RX ORDER — SODIUM CHLORIDE 9 MG/ML
250 INJECTION, SOLUTION INTRAVENOUS ONCE
Status: COMPLETED | OUTPATIENT
Start: 2023-01-20 | End: 2023-01-20

## 2023-01-20 RX ORDER — DIPHENHYDRAMINE HYDROCHLORIDE 50 MG/ML
50 INJECTION INTRAMUSCULAR; INTRAVENOUS AS NEEDED
Status: CANCELLED | OUTPATIENT
Start: 2023-01-20

## 2023-01-20 RX ORDER — OMEPRAZOLE 20 MG/1
20 CAPSULE, DELAYED RELEASE ORAL 2 TIMES DAILY
Qty: 180 CAPSULE | Refills: 0 | Status: SHIPPED | OUTPATIENT
Start: 2023-01-20

## 2023-01-20 RX ORDER — FAMOTIDINE 10 MG/ML
20 INJECTION, SOLUTION INTRAVENOUS AS NEEDED
Status: CANCELLED | OUTPATIENT
Start: 2023-01-20

## 2023-01-20 RX ORDER — SODIUM CHLORIDE 0.9 % (FLUSH) 0.9 %
10 SYRINGE (ML) INJECTION AS NEEDED
Status: DISCONTINUED | OUTPATIENT
Start: 2023-01-20 | End: 2023-01-20 | Stop reason: HOSPADM

## 2023-01-20 RX ORDER — DIPHENHYDRAMINE HYDROCHLORIDE 50 MG/ML
50 INJECTION INTRAMUSCULAR; INTRAVENOUS AS NEEDED
Status: DISCONTINUED | OUTPATIENT
Start: 2023-01-20 | End: 2023-01-20 | Stop reason: HOSPADM

## 2023-01-20 RX ORDER — ATROPINE SULFATE 1 MG/ML
0.25 INJECTION, SOLUTION INTRAMUSCULAR; INTRAVENOUS; SUBCUTANEOUS
Status: DISCONTINUED | OUTPATIENT
Start: 2023-01-20 | End: 2023-01-20 | Stop reason: HOSPADM

## 2023-01-20 RX ADMIN — FOSAPREPITANT 100 ML: 150 INJECTION, POWDER, LYOPHILIZED, FOR SOLUTION INTRAVENOUS at 09:10

## 2023-01-20 RX ADMIN — DEXAMETHASONE SODIUM PHOSPHATE 12 MG: 10 INJECTION, SOLUTION INTRAMUSCULAR; INTRAVENOUS at 10:17

## 2023-01-20 RX ADMIN — PALONOSETRON 0.25 MG: 0.05 INJECTION, SOLUTION INTRAVENOUS at 08:57

## 2023-01-20 RX ADMIN — OLANZAPINE 5 MG: 5 TABLET, FILM COATED ORAL at 08:58

## 2023-01-20 RX ADMIN — SACITUZUMAB GOVITECAN 440 MG: 180 POWDER, FOR SOLUTION INTRAVENOUS at 10:47

## 2023-01-20 RX ADMIN — SODIUM CHLORIDE 250 ML: 9 INJECTION, SOLUTION INTRAVENOUS at 08:57

## 2023-01-20 RX ADMIN — FAMOTIDINE 20 MG: 10 INJECTION INTRAVENOUS at 09:02

## 2023-01-20 RX ADMIN — DIPHENHYDRAMINE HYDROCHLORIDE 25 MG: 50 INJECTION, SOLUTION INTRAMUSCULAR; INTRAVENOUS at 09:52

## 2023-01-20 RX ADMIN — ACETAMINOPHEN 650 MG: 325 TABLET ORAL at 08:57

## 2023-01-20 RX ADMIN — Medication 500 UNITS: at 12:04

## 2023-01-20 RX ADMIN — Medication 10 ML: at 12:04

## 2023-01-20 NOTE — PROGRESS NOTES
Subjective     Radha Duarte was seen in follow-up for metastatic breast cancer.  She is overall stable.  Continues to report intermittent arm pain.  Pain is well controlled.  No nausea or emesis.  Fatigue is present but manageable.  No diarrhea.    Past Medical History, Past Surgical History, Social History, Family History have been reviewed and are without significant changes except as mentioned.        Medications:  The current medication list was reviewed in the EMR    ALLERGIES:    Allergies   Allergen Reactions   • Percocet [Oxycodone-Acetaminophen] Nausea And Vomiting       Objective      Vitals:    01/20/23 0825   BP: 111/57   Pulse: 82   Resp: 18   SpO2: 96%         Current Status 12/9/2022   ECOG score 0       Physical Exam  Vitals and nursing note reviewed.   Constitutional:       Appearance: Normal appearance.   Neurological:      General: No focal deficit present.      Mental Status: She is alert and oriented to person, place, and time. Mental status is at baseline.   Psychiatric:         Mood and Affect: Mood normal.         Behavior: Behavior normal.         Thought Content: Thought content normal.               RECENT LABS:Independently reviewed and summarized  Hematology WBC   Date Value Ref Range Status   01/06/2023 3.12 (L) 3.40 - 10.80 10*3/mm3 Final     RBC   Date Value Ref Range Status   01/06/2023 3.25 (L) 3.77 - 5.28 10*6/mm3 Final     Hemoglobin   Date Value Ref Range Status   01/06/2023 10.1 (L) 12.0 - 15.9 g/dL Final     Hematocrit   Date Value Ref Range Status   01/06/2023 30.8 (L) 34.0 - 46.6 % Final     Platelets   Date Value Ref Range Status   01/06/2023 151 140 - 450 10*3/mm3 Final     WBC   Date Value Ref Range Status   01/20/2023 3.02 (L) 3.40 - 10.80 10*3/mm3 Final     RBC   Date Value Ref Range Status   01/20/2023 3.10 (L) 3.77 - 5.28 10*6/mm3 Final     Hemoglobin   Date Value Ref Range Status   01/20/2023 9.5 (L) 12.0 - 15.9 g/dL Final     Hematocrit   Date Value Ref  Range Status   01/20/2023 30.0 (L) 34.0 - 46.6 % Final     MCV   Date Value Ref Range Status   01/20/2023 96.8 79.0 - 97.0 fL Final     MCH   Date Value Ref Range Status   01/20/2023 30.6 26.6 - 33.0 pg Final     MCHC   Date Value Ref Range Status   01/20/2023 31.7 31.5 - 35.7 g/dL Final     RDW   Date Value Ref Range Status   01/20/2023 14.5 12.3 - 15.4 % Final     RDW-SD   Date Value Ref Range Status   01/20/2023 50.9 37.0 - 54.0 fl Final     MPV   Date Value Ref Range Status   01/20/2023 9.5 6.0 - 12.0 fL Final     Platelets   Date Value Ref Range Status   01/20/2023 156 140 - 450 10*3/mm3 Final     Neutrophil %   Date Value Ref Range Status   01/20/2023 67.3 42.7 - 76.0 % Final     Lymphocyte %   Date Value Ref Range Status   01/20/2023 17.5 (L) 19.6 - 45.3 % Final     Monocyte %   Date Value Ref Range Status   01/20/2023 12.9 (H) 5.0 - 12.0 % Final     Eosinophil %   Date Value Ref Range Status   01/20/2023 1.3 0.3 - 6.2 % Final     Basophil %   Date Value Ref Range Status   01/20/2023 0.7 0.0 - 1.5 % Final     Immature Grans %   Date Value Ref Range Status   01/20/2023 0.3 0.0 - 0.5 % Final     Neutrophils, Absolute   Date Value Ref Range Status   01/20/2023 2.03 1.70 - 7.00 10*3/mm3 Final     Lymphocytes, Absolute   Date Value Ref Range Status   01/20/2023 0.53 (L) 0.70 - 3.10 10*3/mm3 Final     Monocytes, Absolute   Date Value Ref Range Status   01/20/2023 0.39 0.10 - 0.90 10*3/mm3 Final     Eosinophils, Absolute   Date Value Ref Range Status   01/20/2023 0.04 0.00 - 0.40 10*3/mm3 Final     Basophils, Absolute   Date Value Ref Range Status   01/20/2023 0.02 0.00 - 0.20 10*3/mm3 Final     Immature Grans, Absolute   Date Value Ref Range Status   01/20/2023 0.01 0.00 - 0.05 10*3/mm3 Final     nRBC   Date Value Ref Range Status   01/20/2023 0.0 0.0 - 0.2 /100 WBC Final       Lab Results   Component Value Date    GLUCOSE 117 (H) 01/20/2023    BUN 11 01/20/2023    CREATININE 0.78 01/20/2023    BCR 14.1 01/20/2023     K 3.9 01/20/2023    CO2 27.0 01/20/2023    CALCIUM 9.0 01/20/2023    ALBUMIN 3.4 (L) 01/20/2023    AST 13 01/20/2023    ALT 7 01/20/2023            Radha Duarte reports a pain score of 0.  Given her pain assessment as noted, treatment options were discussed and the following options were decided upon as a follow-up plan to address the patient's pain: continuation of current treatment plan for pain.    Patient screened negative for depression based on a PHQ-9 score of 0 on 1/20/2023.       Diagnosis:    (1) Metastatic breast cancer with distant LN (mediastinal) metastases   Triple negative   Unable to do perform foundation one due to limited tissue      Current therapy:   Weekly carboplatin/paclitaxel     (4/22/22- 7/7/22)      Discontinue to pain peripheral neuropathy in lower extremities.      Switch to XELODA (7//22/22)      PET scan on 9/19/22 showed progressive disease, mainly in right breast/chest wall/regional nodes.      Started on trodelvy.   D1C1: 10/4/22      Palliative RT to right breast/LN.      D1C2: 11/4/22  D8C2: 11/11/22   D1C3: 12/9/22  D8C3: 12/16/22      PET scan on 12/19/22 showed favorable response to treatment. BETO.      D1C4: 12/30/22   D8C4: 1/6/23    D1C5: 1/20/23      (2) Cancer associated pain   (3) Right upper extremity DVT   (4) Mucositis   (5) Unintentional weight loss   (6) Chemotherapy induce peripheral neuropathy     Assessment & Plan        (1) Metastatic breast cancer with distant LN (mediastinal) metastases     Chronic, stable  Patient currently on Trodelvy.  PET scan on December 19, 2022 showed favorable response to treatment.  Labs look stable.  Day 1 cycle 5 Trodelvy was signed on today's visit  CBC, CMP next week prior to day 8 cycle 5 Trodelvy.  I will see her back in 3 weeks with CBC, CMP in 3 weeks.       (2) Cancer associated pain     Chronic, stable  She is on MS Contin 30 mg twice a day.  She will continue this.    (3) Right upper extremity DVT     Chronic,  stable  She is on Eliquis.  No new concern for bleeding or thrombosis.  Continue with close monitoring.  New prescription sent to      (4) Mucositis     Chronic, stable.  Continue Magic mouthwash as needed.    (5) Unintentional weight loss     Chronic, stable.  Recommend high-calorie high-protein diet.    (6) Chemotherapy induce peripheral neuropathy     Chronic, stable.  She is on Neurontin 400 mg 3 times daily.  She will continue this  Prescription sent.    1/20/2023      CC:

## 2023-01-27 ENCOUNTER — INFUSION (OUTPATIENT)
Dept: ONCOLOGY | Facility: HOSPITAL | Age: 63
End: 2023-01-27
Payer: OTHER GOVERNMENT

## 2023-01-27 VITALS
SYSTOLIC BLOOD PRESSURE: 118 MMHG | HEART RATE: 89 BPM | TEMPERATURE: 97.9 F | RESPIRATION RATE: 18 BRPM | DIASTOLIC BLOOD PRESSURE: 65 MMHG

## 2023-01-27 DIAGNOSIS — C50.919 MALIGNANT NEOPLASM OF FEMALE BREAST, UNSPECIFIED ESTROGEN RECEPTOR STATUS, UNSPECIFIED LATERALITY, UNSPECIFIED SITE OF BREAST: Primary | ICD-10-CM

## 2023-01-27 LAB
ALBUMIN SERPL-MCNC: 3.7 G/DL (ref 3.5–5.2)
ALBUMIN/GLOB SERPL: 1.4 G/DL
ALP SERPL-CCNC: 92 U/L (ref 39–117)
ALT SERPL W P-5'-P-CCNC: 9 U/L (ref 1–33)
ANION GAP SERPL CALCULATED.3IONS-SCNC: 7 MMOL/L (ref 5–15)
AST SERPL-CCNC: 15 U/L (ref 1–32)
BASOPHILS # BLD AUTO: 0.03 10*3/MM3 (ref 0–0.2)
BASOPHILS NFR BLD AUTO: 0.5 % (ref 0–1.5)
BILIRUB SERPL-MCNC: 0.2 MG/DL (ref 0–1.2)
BUN SERPL-MCNC: 9 MG/DL (ref 8–23)
BUN/CREAT SERPL: 15.8 (ref 7–25)
CALCIUM SPEC-SCNC: 9.1 MG/DL (ref 8.6–10.5)
CHLORIDE SERPL-SCNC: 104 MMOL/L (ref 98–107)
CO2 SERPL-SCNC: 27 MMOL/L (ref 22–29)
CREAT SERPL-MCNC: 0.57 MG/DL (ref 0.57–1)
DEPRECATED RDW RBC AUTO: 49.9 FL (ref 37–54)
EGFRCR SERPLBLD CKD-EPI 2021: 101.6 ML/MIN/1.73
EOSINOPHIL # BLD AUTO: 0.06 10*3/MM3 (ref 0–0.4)
EOSINOPHIL NFR BLD AUTO: 1.1 % (ref 0.3–6.2)
ERYTHROCYTE [DISTWIDTH] IN BLOOD BY AUTOMATED COUNT: 14.5 % (ref 12.3–15.4)
GLOBULIN UR ELPH-MCNC: 2.7 GM/DL
GLUCOSE SERPL-MCNC: 117 MG/DL (ref 65–99)
HCT VFR BLD AUTO: 30.3 % (ref 34–46.6)
HGB BLD-MCNC: 10.1 G/DL (ref 12–15.9)
HOLD SPECIMEN: NORMAL
IMM GRANULOCYTES # BLD AUTO: 0.09 10*3/MM3 (ref 0–0.05)
IMM GRANULOCYTES NFR BLD AUTO: 1.6 % (ref 0–0.5)
LYMPHOCYTES # BLD AUTO: 0.94 10*3/MM3 (ref 0.7–3.1)
LYMPHOCYTES NFR BLD AUTO: 16.8 % (ref 19.6–45.3)
MCH RBC QN AUTO: 32 PG (ref 26.6–33)
MCHC RBC AUTO-ENTMCNC: 33.3 G/DL (ref 31.5–35.7)
MCV RBC AUTO: 95.9 FL (ref 79–97)
MONOCYTES # BLD AUTO: 0.5 10*3/MM3 (ref 0.1–0.9)
MONOCYTES NFR BLD AUTO: 8.9 % (ref 5–12)
NEUTROPHILS NFR BLD AUTO: 3.99 10*3/MM3 (ref 1.7–7)
NEUTROPHILS NFR BLD AUTO: 71.1 % (ref 42.7–76)
NRBC BLD AUTO-RTO: 0 /100 WBC (ref 0–0.2)
PLATELET # BLD AUTO: 168 10*3/MM3 (ref 140–450)
PMV BLD AUTO: 9.8 FL (ref 6–12)
POTASSIUM SERPL-SCNC: 4.1 MMOL/L (ref 3.5–5.2)
PROT SERPL-MCNC: 6.4 G/DL (ref 6–8.5)
RBC # BLD AUTO: 3.16 10*6/MM3 (ref 3.77–5.28)
SODIUM SERPL-SCNC: 138 MMOL/L (ref 136–145)
WBC NRBC COR # BLD: 5.61 10*3/MM3 (ref 3.4–10.8)

## 2023-01-27 PROCEDURE — 96367 TX/PROPH/DG ADDL SEQ IV INF: CPT | Performed by: INTERNAL MEDICINE

## 2023-01-27 PROCEDURE — 36415 COLL VENOUS BLD VENIPUNCTURE: CPT

## 2023-01-27 PROCEDURE — 25010000002 DIPHENHYDRAMINE PER 50 MG: Performed by: INTERNAL MEDICINE

## 2023-01-27 PROCEDURE — 25010000002 DEXAMETHASONE SODIUM PHOSPHATE 100 MG/10ML SOLUTION: Performed by: INTERNAL MEDICINE

## 2023-01-27 PROCEDURE — 25010000002 PALONOSETRON PER 25 MCG: Performed by: INTERNAL MEDICINE

## 2023-01-27 PROCEDURE — 25010000002 SACITUZUMAB GOVITECAN-HZIY 180 MG RECONSTITUTED SOLUTION 1 EACH VIAL: Performed by: INTERNAL MEDICINE

## 2023-01-27 PROCEDURE — 85025 COMPLETE CBC W/AUTO DIFF WBC: CPT

## 2023-01-27 PROCEDURE — 80053 COMPREHEN METABOLIC PANEL: CPT

## 2023-01-27 PROCEDURE — 25010000002 FOSAPREPITANT PER 1 MG: Performed by: INTERNAL MEDICINE

## 2023-01-27 PROCEDURE — 25010000002 HEPARIN LOCK FLUSH PER 10 UNITS: Performed by: INTERNAL MEDICINE

## 2023-01-27 PROCEDURE — 96375 TX/PRO/DX INJ NEW DRUG ADDON: CPT | Performed by: INTERNAL MEDICINE

## 2023-01-27 PROCEDURE — 96413 CHEMO IV INFUSION 1 HR: CPT | Performed by: INTERNAL MEDICINE

## 2023-01-27 RX ORDER — SODIUM CHLORIDE 0.9 % (FLUSH) 0.9 %
10 SYRINGE (ML) INJECTION AS NEEDED
Status: CANCELLED | OUTPATIENT
Start: 2023-01-27

## 2023-01-27 RX ORDER — OLANZAPINE 5 MG/1
5 TABLET ORAL ONCE
Status: CANCELLED | OUTPATIENT
Start: 2023-01-27 | End: 2023-01-27

## 2023-01-27 RX ORDER — PALONOSETRON 0.05 MG/ML
0.25 INJECTION, SOLUTION INTRAVENOUS ONCE
Status: CANCELLED | OUTPATIENT
Start: 2023-01-27

## 2023-01-27 RX ORDER — SODIUM CHLORIDE 9 MG/ML
250 INJECTION, SOLUTION INTRAVENOUS ONCE
Status: COMPLETED | OUTPATIENT
Start: 2023-01-27 | End: 2023-01-27

## 2023-01-27 RX ORDER — DIPHENHYDRAMINE HYDROCHLORIDE 50 MG/ML
50 INJECTION INTRAMUSCULAR; INTRAVENOUS AS NEEDED
Status: CANCELLED | OUTPATIENT
Start: 2023-01-27

## 2023-01-27 RX ORDER — FAMOTIDINE 10 MG/ML
20 INJECTION, SOLUTION INTRAVENOUS ONCE
Status: COMPLETED | OUTPATIENT
Start: 2023-01-27 | End: 2023-01-27

## 2023-01-27 RX ORDER — ACETAMINOPHEN 325 MG/1
650 TABLET ORAL ONCE
Status: CANCELLED | OUTPATIENT
Start: 2023-01-27

## 2023-01-27 RX ORDER — FAMOTIDINE 10 MG/ML
20 INJECTION, SOLUTION INTRAVENOUS AS NEEDED
Status: DISCONTINUED | OUTPATIENT
Start: 2023-01-27 | End: 2023-01-27 | Stop reason: HOSPADM

## 2023-01-27 RX ORDER — FAMOTIDINE 10 MG/ML
20 INJECTION, SOLUTION INTRAVENOUS ONCE
Status: CANCELLED | OUTPATIENT
Start: 2023-01-27

## 2023-01-27 RX ORDER — SODIUM CHLORIDE 0.9 % (FLUSH) 0.9 %
10 SYRINGE (ML) INJECTION AS NEEDED
Status: DISCONTINUED | OUTPATIENT
Start: 2023-01-27 | End: 2023-01-27 | Stop reason: HOSPADM

## 2023-01-27 RX ORDER — HEPARIN SODIUM (PORCINE) LOCK FLUSH IV SOLN 100 UNIT/ML 100 UNIT/ML
500 SOLUTION INTRAVENOUS AS NEEDED
Status: DISCONTINUED | OUTPATIENT
Start: 2023-01-27 | End: 2023-01-27 | Stop reason: HOSPADM

## 2023-01-27 RX ORDER — ATROPINE SULFATE 1 MG/ML
0.25 INJECTION, SOLUTION INTRAMUSCULAR; INTRAVENOUS; SUBCUTANEOUS
Status: CANCELLED | OUTPATIENT
Start: 2023-01-27

## 2023-01-27 RX ORDER — ATROPINE SULFATE 1 MG/ML
0.25 INJECTION, SOLUTION INTRAMUSCULAR; INTRAVENOUS; SUBCUTANEOUS
Status: DISCONTINUED | OUTPATIENT
Start: 2023-01-27 | End: 2023-01-27 | Stop reason: HOSPADM

## 2023-01-27 RX ORDER — ACETAMINOPHEN 325 MG/1
650 TABLET ORAL ONCE
Status: COMPLETED | OUTPATIENT
Start: 2023-01-27 | End: 2023-01-27

## 2023-01-27 RX ORDER — OLANZAPINE 5 MG/1
5 TABLET ORAL ONCE
Status: COMPLETED | OUTPATIENT
Start: 2023-01-27 | End: 2023-01-27

## 2023-01-27 RX ORDER — FAMOTIDINE 10 MG/ML
20 INJECTION, SOLUTION INTRAVENOUS AS NEEDED
Status: CANCELLED | OUTPATIENT
Start: 2023-01-27

## 2023-01-27 RX ORDER — DIPHENHYDRAMINE HYDROCHLORIDE 50 MG/ML
50 INJECTION INTRAMUSCULAR; INTRAVENOUS AS NEEDED
Status: DISCONTINUED | OUTPATIENT
Start: 2023-01-27 | End: 2023-01-27 | Stop reason: HOSPADM

## 2023-01-27 RX ORDER — SODIUM CHLORIDE 9 MG/ML
250 INJECTION, SOLUTION INTRAVENOUS ONCE
Status: CANCELLED | OUTPATIENT
Start: 2023-01-27

## 2023-01-27 RX ORDER — PALONOSETRON 0.05 MG/ML
0.25 INJECTION, SOLUTION INTRAVENOUS ONCE
Status: COMPLETED | OUTPATIENT
Start: 2023-01-27 | End: 2023-01-27

## 2023-01-27 RX ORDER — HEPARIN SODIUM (PORCINE) LOCK FLUSH IV SOLN 100 UNIT/ML 100 UNIT/ML
500 SOLUTION INTRAVENOUS AS NEEDED
Status: CANCELLED | OUTPATIENT
Start: 2023-01-27

## 2023-01-27 RX ADMIN — OLANZAPINE 5 MG: 5 TABLET, FILM COATED ORAL at 08:37

## 2023-01-27 RX ADMIN — PALONOSETRON 0.25 MG: 0.05 INJECTION, SOLUTION INTRAVENOUS at 08:37

## 2023-01-27 RX ADMIN — FAMOTIDINE 20 MG: 10 INJECTION INTRAVENOUS at 09:22

## 2023-01-27 RX ADMIN — SODIUM CHLORIDE 250 ML: 9 INJECTION, SOLUTION INTRAVENOUS at 08:37

## 2023-01-27 RX ADMIN — DEXAMETHASONE SODIUM PHOSPHATE 12 MG: 10 INJECTION, SOLUTION INTRAMUSCULAR; INTRAVENOUS at 09:55

## 2023-01-27 RX ADMIN — ACETAMINOPHEN 650 MG: 325 TABLET ORAL at 09:22

## 2023-01-27 RX ADMIN — DIPHENHYDRAMINE HYDROCHLORIDE 25 MG: 50 INJECTION, SOLUTION INTRAMUSCULAR; INTRAVENOUS at 09:27

## 2023-01-27 RX ADMIN — Medication 500 UNITS: at 11:48

## 2023-01-27 RX ADMIN — FOSAPREPITANT 100 ML: 150 INJECTION, POWDER, LYOPHILIZED, FOR SOLUTION INTRAVENOUS at 08:42

## 2023-01-27 RX ADMIN — SACITUZUMAB GOVITECAN 440 MG: 180 POWDER, FOR SOLUTION INTRAVENOUS at 10:28

## 2023-01-27 RX ADMIN — Medication 10 ML: at 11:48

## 2023-01-30 ENCOUNTER — DOCUMENTATION (OUTPATIENT)
Dept: NUTRITION | Facility: HOSPITAL | Age: 63
End: 2023-01-30
Payer: OTHER GOVERNMENT

## 2023-02-06 DIAGNOSIS — G89.3 CANCER ASSOCIATED PAIN: ICD-10-CM

## 2023-02-06 RX ORDER — MORPHINE SULFATE 15 MG/1
30 TABLET, FILM COATED, EXTENDED RELEASE ORAL 2 TIMES DAILY
Qty: 90 TABLET | Refills: 0 | Status: SHIPPED | OUTPATIENT
Start: 2023-02-06 | End: 2023-02-27 | Stop reason: SDUPTHER

## 2023-02-06 NOTE — TELEPHONE ENCOUNTER
Rx Refill Note  Requested Prescriptions     Pending Prescriptions Disp Refills   • Morphine (MS CONTIN) 15 MG 12 hr tablet 90 tablet 0     Sig: Take 2 tablets by mouth 2 (Two) Times a Day.      Last office visit with prescribing clinician: 1/20/2023   Last telemedicine visit with prescribing clinician: 2/10/2023   Next office visit with prescribing clinician: 2/10/2023                         Would you like a call back once the refill request has been completed: [] Yes [] No    If the office needs to give you a call back, can they leave a voicemail: [] Yes [] No    Chaparro Campos Rep  02/06/23, 08:12 CST

## 2023-02-06 NOTE — TELEPHONE ENCOUNTER
Incoming Refill Request      Medication requested (name and dose): Morphine    Pharmacy where request should be sent: [facility]    Additional details provided by patient: Per the morning voicemail - pt would like a call back once this has been done.    Best call back number: [phone]    Does the patient have less than a 3 day supply:  [x] Yes  [] No    Breonna Khan  02/06/23, 08:07 CST

## 2023-02-09 NOTE — PROGRESS NOTES
Subjective     Radha Duarte was seen in follow-up for metastatic breast cancer.  She is overall stable.  Denies any new health issues.  Right arm pain/tightness has been stable.  No nausea or emesis.  Intermittent diarrhea which responds to Imodium.  Overall pain has been well controlled.    Past Medical History, Past Surgical History, Social History, Family History have been reviewed and are without significant changes except as mentioned.        Medications:  The current medication list was reviewed in the EMR    ALLERGIES:    Allergies   Allergen Reactions   • Percocet [Oxycodone-Acetaminophen] Nausea And Vomiting       Objective      Vitals:    02/10/23 0802   BP: 105/55   Pulse: 88   Resp: 18   Temp: 97.6 °F (36.4 °C)   SpO2: 96%         Current Status 1/27/2023   ECOG score 0       Physical Exam  Vitals and nursing note reviewed. Exam conducted with a chaperone present.   Constitutional:       Appearance: Normal appearance.   Musculoskeletal:      Comments: Right upper extremity edema is overall stable.  Right breast swelling/edema stable.   Skin:     General: Skin is dry.      Coloration: Skin is pale.      Findings: Bruising present.   Neurological:      General: No focal deficit present.      Mental Status: She is alert and oriented to person, place, and time. Mental status is at baseline.   Psychiatric:         Mood and Affect: Mood normal.         Behavior: Behavior normal.         Thought Content: Thought content normal.               RECENT LABS:Independently reviewed and summarized  Hematology WBC   Date Value Ref Range Status   01/27/2023 5.61 3.40 - 10.80 10*3/mm3 Final     RBC   Date Value Ref Range Status   01/27/2023 3.16 (L) 3.77 - 5.28 10*6/mm3 Final     Hemoglobin   Date Value Ref Range Status   01/27/2023 10.1 (L) 12.0 - 15.9 g/dL Final     Hematocrit   Date Value Ref Range Status   01/27/2023 30.3 (L) 34.0 - 46.6 % Final     Platelets   Date Value Ref Range Status   01/27/2023 168  140 - 450 10*3/mm3 Final     WBC   Date Value Ref Range Status   02/10/2023 4.10 3.40 - 10.80 10*3/mm3 Final     RBC   Date Value Ref Range Status   02/10/2023 3.04 (L) 3.77 - 5.28 10*6/mm3 Final     Hemoglobin   Date Value Ref Range Status   02/10/2023 9.4 (L) 12.0 - 15.9 g/dL Final     Hematocrit   Date Value Ref Range Status   02/10/2023 29.2 (L) 34.0 - 46.6 % Final     MCV   Date Value Ref Range Status   02/10/2023 96.1 79.0 - 97.0 fL Final     MCH   Date Value Ref Range Status   02/10/2023 30.9 26.6 - 33.0 pg Final     MCHC   Date Value Ref Range Status   02/10/2023 32.2 31.5 - 35.7 g/dL Final     RDW   Date Value Ref Range Status   02/10/2023 14.7 12.3 - 15.4 % Final     RDW-SD   Date Value Ref Range Status   02/10/2023 50.9 37.0 - 54.0 fl Final     MPV   Date Value Ref Range Status   02/10/2023 9.8 6.0 - 12.0 fL Final     Platelets   Date Value Ref Range Status   02/10/2023 132 (L) 140 - 450 10*3/mm3 Final     Neutrophil %   Date Value Ref Range Status   02/10/2023 73.4 42.7 - 76.0 % Final     Lymphocyte %   Date Value Ref Range Status   02/10/2023 15.1 (L) 19.6 - 45.3 % Final     Monocyte %   Date Value Ref Range Status   02/10/2023 9.8 5.0 - 12.0 % Final     Eosinophil %   Date Value Ref Range Status   02/10/2023 1.0 0.3 - 6.2 % Final     Basophil %   Date Value Ref Range Status   02/10/2023 0.5 0.0 - 1.5 % Final     Immature Grans %   Date Value Ref Range Status   02/10/2023 0.2 0.0 - 0.5 % Final     Neutrophils, Absolute   Date Value Ref Range Status   02/10/2023 3.01 1.70 - 7.00 10*3/mm3 Final     Lymphocytes, Absolute   Date Value Ref Range Status   02/10/2023 0.62 (L) 0.70 - 3.10 10*3/mm3 Final     Monocytes, Absolute   Date Value Ref Range Status   02/10/2023 0.40 0.10 - 0.90 10*3/mm3 Final     Eosinophils, Absolute   Date Value Ref Range Status   02/10/2023 0.04 0.00 - 0.40 10*3/mm3 Final     Basophils, Absolute   Date Value Ref Range Status   02/10/2023 0.02 0.00 - 0.20 10*3/mm3 Final     Immature  Grans, Absolute   Date Value Ref Range Status   02/10/2023 0.01 0.00 - 0.05 10*3/mm3 Final     nRBC   Date Value Ref Range Status   02/10/2023 0.0 0.0 - 0.2 /100 WBC Final     Lab Results   Component Value Date    GLUCOSE 109 (H) 02/10/2023    BUN 9 02/10/2023    CREATININE 0.64 02/10/2023    EGFR 98.8 02/10/2023    BCR 14.1 02/10/2023    K 4.1 02/10/2023    CO2 23.0 02/10/2023    CALCIUM 8.5 (L) 02/10/2023    ALBUMIN 3.5 02/10/2023    AST 14 02/10/2023    ALT 7 02/10/2023            Radha Duarte reports a pain score of 0.  Given her pain assessment as noted, treatment options were discussed and the following options were decided upon as a follow-up plan to address the patient's pain: continuation of current treatment plan for pain.    Patient screened negative for depression based on a PHQ-9 score of 0 on 2/10/2023.       Diagnosis:    (1) Metastatic breast cancer with distant LN (mediastinal) metastases   Triple negative   Unable to do perform foundation one due to limited tissue      Current therapy:   Weekly carboplatin/paclitaxel     (4/22/22- 7/7/22)      Discontinue to pain peripheral neuropathy in lower extremities.      Switch to XELODA (7//22/22)      PET scan on 9/19/22 showed progressive disease, mainly in right breast/chest wall/regional nodes.      Started on trodelvy.   D1C1: 10/4/22      Palliative RT to right breast/LN.      D1C2: 11/4/22  D8C2: 11/11/22   D1C3: 12/9/22  D8C3: 12/16/22      PET scan on 12/19/22 showed favorable response to treatment. BETO.      D1C4: 12/30/22   D8C4: 1/6/23     D1C5: 1/20/23   D8C5: 1/27/23     D1C6: 2/9/23      (2) Cancer associated pain   (3) Right upper extremity DVT   (4) Mucositis   (5) Unintentional weight loss   (6) Chemotherapy induce peripheral neuropathy     Assessment & Plan      (1) Metastatic breast cancer with distant LN (mediastinal) metastases    Chronic, stable.  She is currently on Trodelvy.  PET scan on December 19, 2022 showed favorable  response to treatment.  Labs look stable.  Day 1 cycle 6 Dayanara was signed on today's visit.  CBC, CMP next week prior to day 8 cycle 6 today B.  I will see her back in 3 weeks with CBC, CMP in 3 weeks.    (2) Cancer associated pain     Chronic, stable.  She is on MS Contin 30 mg twice a day.  She will continue this.  New prescription sent.    (3) Right upper extremity DVT     Chronic, stable.  She is on Eliquis.  No new concern for bleeding or thrombosis.  Continue close monitoring    (4) Mucositis     Chronic, stable.  Magic mouthwash as needed.    (5) Unintentional weight loss     Chronic, stable.  Commend high-calorie high-protein diet.    (6) Chemotherapy induce peripheral neuropathy     Chronic, stable.  She is on Neurontin 400 mg 3 times daily.  She will continue this.    2/9/2023      CC:

## 2023-02-10 ENCOUNTER — OFFICE VISIT (OUTPATIENT)
Dept: ONCOLOGY | Facility: CLINIC | Age: 63
End: 2023-02-10
Payer: OTHER GOVERNMENT

## 2023-02-10 ENCOUNTER — INFUSION (OUTPATIENT)
Dept: ONCOLOGY | Facility: HOSPITAL | Age: 63
End: 2023-02-10
Payer: OTHER GOVERNMENT

## 2023-02-10 VITALS
TEMPERATURE: 97.6 F | BODY MASS INDEX: 22.32 KG/M2 | HEART RATE: 88 BPM | WEIGHT: 126 LBS | OXYGEN SATURATION: 96 % | SYSTOLIC BLOOD PRESSURE: 105 MMHG | RESPIRATION RATE: 18 BRPM | DIASTOLIC BLOOD PRESSURE: 55 MMHG

## 2023-02-10 DIAGNOSIS — C50.919 MALIGNANT NEOPLASM OF FEMALE BREAST, UNSPECIFIED ESTROGEN RECEPTOR STATUS, UNSPECIFIED LATERALITY, UNSPECIFIED SITE OF BREAST: Primary | ICD-10-CM

## 2023-02-10 DIAGNOSIS — G89.3 CANCER ASSOCIATED PAIN: ICD-10-CM

## 2023-02-10 DIAGNOSIS — I82.A12 ACUTE DEEP VEIN THROMBOSIS (DVT) OF AXILLARY VEIN OF LEFT UPPER EXTREMITY: ICD-10-CM

## 2023-02-10 LAB
ALBUMIN SERPL-MCNC: 3.5 G/DL (ref 3.5–5.2)
ALBUMIN/GLOB SERPL: 1.5 G/DL
ALP SERPL-CCNC: 73 U/L (ref 39–117)
ALT SERPL W P-5'-P-CCNC: 7 U/L (ref 1–33)
ANION GAP SERPL CALCULATED.3IONS-SCNC: 9 MMOL/L (ref 5–15)
AST SERPL-CCNC: 14 U/L (ref 1–32)
BASOPHILS # BLD AUTO: 0.02 10*3/MM3 (ref 0–0.2)
BASOPHILS NFR BLD AUTO: 0.5 % (ref 0–1.5)
BILIRUB SERPL-MCNC: 0.3 MG/DL (ref 0–1.2)
BUN SERPL-MCNC: 9 MG/DL (ref 8–23)
BUN/CREAT SERPL: 14.1 (ref 7–25)
CALCIUM SPEC-SCNC: 8.5 MG/DL (ref 8.6–10.5)
CHLORIDE SERPL-SCNC: 107 MMOL/L (ref 98–107)
CO2 SERPL-SCNC: 23 MMOL/L (ref 22–29)
CREAT SERPL-MCNC: 0.64 MG/DL (ref 0.57–1)
DEPRECATED RDW RBC AUTO: 50.9 FL (ref 37–54)
EGFRCR SERPLBLD CKD-EPI 2021: 98.8 ML/MIN/1.73
EOSINOPHIL # BLD AUTO: 0.04 10*3/MM3 (ref 0–0.4)
EOSINOPHIL NFR BLD AUTO: 1 % (ref 0.3–6.2)
ERYTHROCYTE [DISTWIDTH] IN BLOOD BY AUTOMATED COUNT: 14.7 % (ref 12.3–15.4)
GLOBULIN UR ELPH-MCNC: 2.4 GM/DL
GLUCOSE SERPL-MCNC: 109 MG/DL (ref 65–99)
HCT VFR BLD AUTO: 29.2 % (ref 34–46.6)
HGB BLD-MCNC: 9.4 G/DL (ref 12–15.9)
HOLD SPECIMEN: NORMAL
IMM GRANULOCYTES # BLD AUTO: 0.01 10*3/MM3 (ref 0–0.05)
IMM GRANULOCYTES NFR BLD AUTO: 0.2 % (ref 0–0.5)
LYMPHOCYTES # BLD AUTO: 0.62 10*3/MM3 (ref 0.7–3.1)
LYMPHOCYTES NFR BLD AUTO: 15.1 % (ref 19.6–45.3)
MCH RBC QN AUTO: 30.9 PG (ref 26.6–33)
MCHC RBC AUTO-ENTMCNC: 32.2 G/DL (ref 31.5–35.7)
MCV RBC AUTO: 96.1 FL (ref 79–97)
MONOCYTES # BLD AUTO: 0.4 10*3/MM3 (ref 0.1–0.9)
MONOCYTES NFR BLD AUTO: 9.8 % (ref 5–12)
NEUTROPHILS NFR BLD AUTO: 3.01 10*3/MM3 (ref 1.7–7)
NEUTROPHILS NFR BLD AUTO: 73.4 % (ref 42.7–76)
NRBC BLD AUTO-RTO: 0 /100 WBC (ref 0–0.2)
PLATELET # BLD AUTO: 132 10*3/MM3 (ref 140–450)
PMV BLD AUTO: 9.8 FL (ref 6–12)
POTASSIUM SERPL-SCNC: 4.1 MMOL/L (ref 3.5–5.2)
PROT SERPL-MCNC: 5.9 G/DL (ref 6–8.5)
RBC # BLD AUTO: 3.04 10*6/MM3 (ref 3.77–5.28)
SODIUM SERPL-SCNC: 139 MMOL/L (ref 136–145)
WBC NRBC COR # BLD: 4.1 10*3/MM3 (ref 3.4–10.8)

## 2023-02-10 PROCEDURE — 25010000002 DEXAMETHASONE SODIUM PHOSPHATE 100 MG/10ML SOLUTION: Performed by: INTERNAL MEDICINE

## 2023-02-10 PROCEDURE — 96413 CHEMO IV INFUSION 1 HR: CPT | Performed by: INTERNAL MEDICINE

## 2023-02-10 PROCEDURE — 36415 COLL VENOUS BLD VENIPUNCTURE: CPT

## 2023-02-10 PROCEDURE — 80053 COMPREHEN METABOLIC PANEL: CPT

## 2023-02-10 PROCEDURE — 85025 COMPLETE CBC W/AUTO DIFF WBC: CPT

## 2023-02-10 PROCEDURE — 99214 OFFICE O/P EST MOD 30 MIN: CPT | Performed by: INTERNAL MEDICINE

## 2023-02-10 PROCEDURE — 96375 TX/PRO/DX INJ NEW DRUG ADDON: CPT | Performed by: INTERNAL MEDICINE

## 2023-02-10 PROCEDURE — 96367 TX/PROPH/DG ADDL SEQ IV INF: CPT | Performed by: INTERNAL MEDICINE

## 2023-02-10 PROCEDURE — 25010000002 PALONOSETRON PER 25 MCG: Performed by: INTERNAL MEDICINE

## 2023-02-10 PROCEDURE — 25010000002 DIPHENHYDRAMINE PER 50 MG: Performed by: INTERNAL MEDICINE

## 2023-02-10 PROCEDURE — 25010000002 SACITUZUMAB GOVITECAN-HZIY 180 MG RECONSTITUTED SOLUTION 1 EACH VIAL: Performed by: INTERNAL MEDICINE

## 2023-02-10 PROCEDURE — 25010000002 HEPARIN LOCK FLUSH PER 10 UNITS: Performed by: INTERNAL MEDICINE

## 2023-02-10 PROCEDURE — 25010000002 FOSAPREPITANT PER 1 MG: Performed by: INTERNAL MEDICINE

## 2023-02-10 RX ORDER — ACETAMINOPHEN 325 MG/1
650 TABLET ORAL ONCE
Status: COMPLETED | OUTPATIENT
Start: 2023-02-10 | End: 2023-02-10

## 2023-02-10 RX ORDER — ATROPINE SULFATE 1 MG/ML
0.25 INJECTION, SOLUTION INTRAMUSCULAR; INTRAVENOUS; SUBCUTANEOUS
Status: DISCONTINUED | OUTPATIENT
Start: 2023-02-10 | End: 2023-02-10 | Stop reason: HOSPADM

## 2023-02-10 RX ORDER — OLANZAPINE 5 MG/1
5 TABLET ORAL ONCE
Status: COMPLETED | OUTPATIENT
Start: 2023-02-10 | End: 2023-02-10

## 2023-02-10 RX ORDER — SODIUM CHLORIDE 9 MG/ML
250 INJECTION, SOLUTION INTRAVENOUS ONCE
Status: CANCELLED | OUTPATIENT
Start: 2023-02-10

## 2023-02-10 RX ORDER — FAMOTIDINE 10 MG/ML
20 INJECTION, SOLUTION INTRAVENOUS AS NEEDED
Status: DISCONTINUED | OUTPATIENT
Start: 2023-02-10 | End: 2023-02-10 | Stop reason: HOSPADM

## 2023-02-10 RX ORDER — FAMOTIDINE 10 MG/ML
20 INJECTION, SOLUTION INTRAVENOUS ONCE
Status: CANCELLED | OUTPATIENT
Start: 2023-02-17

## 2023-02-10 RX ORDER — ATROPINE SULFATE 1 MG/ML
0.25 INJECTION, SOLUTION INTRAMUSCULAR; INTRAVENOUS; SUBCUTANEOUS
Status: CANCELLED | OUTPATIENT
Start: 2023-02-17

## 2023-02-10 RX ORDER — FAMOTIDINE 10 MG/ML
20 INJECTION, SOLUTION INTRAVENOUS ONCE
Status: COMPLETED | OUTPATIENT
Start: 2023-02-10 | End: 2023-02-10

## 2023-02-10 RX ORDER — FAMOTIDINE 10 MG/ML
20 INJECTION, SOLUTION INTRAVENOUS ONCE
Status: CANCELLED | OUTPATIENT
Start: 2023-02-10

## 2023-02-10 RX ORDER — SODIUM CHLORIDE 9 MG/ML
250 INJECTION, SOLUTION INTRAVENOUS ONCE
Status: CANCELLED | OUTPATIENT
Start: 2023-02-17

## 2023-02-10 RX ORDER — ATROPINE SULFATE 1 MG/ML
0.25 INJECTION, SOLUTION INTRAMUSCULAR; INTRAVENOUS; SUBCUTANEOUS
Status: CANCELLED | OUTPATIENT
Start: 2023-02-10

## 2023-02-10 RX ORDER — ACETAMINOPHEN 325 MG/1
650 TABLET ORAL ONCE
Status: CANCELLED | OUTPATIENT
Start: 2023-02-17

## 2023-02-10 RX ORDER — FAMOTIDINE 10 MG/ML
20 INJECTION, SOLUTION INTRAVENOUS AS NEEDED
Status: CANCELLED | OUTPATIENT
Start: 2023-02-17

## 2023-02-10 RX ORDER — DIPHENHYDRAMINE HYDROCHLORIDE 50 MG/ML
50 INJECTION INTRAMUSCULAR; INTRAVENOUS AS NEEDED
Status: DISCONTINUED | OUTPATIENT
Start: 2023-02-10 | End: 2023-02-10 | Stop reason: HOSPADM

## 2023-02-10 RX ORDER — PALONOSETRON 0.05 MG/ML
0.25 INJECTION, SOLUTION INTRAVENOUS ONCE
Status: CANCELLED | OUTPATIENT
Start: 2023-02-10

## 2023-02-10 RX ORDER — DIPHENHYDRAMINE HYDROCHLORIDE 50 MG/ML
50 INJECTION INTRAMUSCULAR; INTRAVENOUS AS NEEDED
Status: CANCELLED | OUTPATIENT
Start: 2023-02-10

## 2023-02-10 RX ORDER — PALONOSETRON 0.05 MG/ML
0.25 INJECTION, SOLUTION INTRAVENOUS ONCE
Status: CANCELLED | OUTPATIENT
Start: 2023-02-17

## 2023-02-10 RX ORDER — FAMOTIDINE 10 MG/ML
20 INJECTION, SOLUTION INTRAVENOUS AS NEEDED
Status: CANCELLED | OUTPATIENT
Start: 2023-02-10

## 2023-02-10 RX ORDER — HEPARIN SODIUM (PORCINE) LOCK FLUSH IV SOLN 100 UNIT/ML 100 UNIT/ML
500 SOLUTION INTRAVENOUS AS NEEDED
Status: DISCONTINUED | OUTPATIENT
Start: 2023-02-10 | End: 2023-02-10 | Stop reason: HOSPADM

## 2023-02-10 RX ORDER — PALONOSETRON 0.05 MG/ML
0.25 INJECTION, SOLUTION INTRAVENOUS ONCE
Status: COMPLETED | OUTPATIENT
Start: 2023-02-10 | End: 2023-02-10

## 2023-02-10 RX ORDER — OLANZAPINE 5 MG/1
5 TABLET ORAL ONCE
Status: CANCELLED | OUTPATIENT
Start: 2023-02-17 | End: 2023-02-17

## 2023-02-10 RX ORDER — SODIUM CHLORIDE 9 MG/ML
250 INJECTION, SOLUTION INTRAVENOUS ONCE
Status: COMPLETED | OUTPATIENT
Start: 2023-02-10 | End: 2023-02-10

## 2023-02-10 RX ORDER — DIPHENHYDRAMINE HYDROCHLORIDE 50 MG/ML
50 INJECTION INTRAMUSCULAR; INTRAVENOUS AS NEEDED
Status: CANCELLED | OUTPATIENT
Start: 2023-02-17

## 2023-02-10 RX ORDER — HEPARIN SODIUM (PORCINE) LOCK FLUSH IV SOLN 100 UNIT/ML 100 UNIT/ML
500 SOLUTION INTRAVENOUS AS NEEDED
Status: CANCELLED | OUTPATIENT
Start: 2023-02-10

## 2023-02-10 RX ORDER — SODIUM CHLORIDE 0.9 % (FLUSH) 0.9 %
10 SYRINGE (ML) INJECTION AS NEEDED
Status: DISCONTINUED | OUTPATIENT
Start: 2023-02-10 | End: 2023-02-10 | Stop reason: HOSPADM

## 2023-02-10 RX ORDER — ACETAMINOPHEN 325 MG/1
650 TABLET ORAL ONCE
Status: CANCELLED | OUTPATIENT
Start: 2023-02-10

## 2023-02-10 RX ORDER — SODIUM CHLORIDE 0.9 % (FLUSH) 0.9 %
10 SYRINGE (ML) INJECTION AS NEEDED
Status: CANCELLED | OUTPATIENT
Start: 2023-02-10

## 2023-02-10 RX ORDER — OLANZAPINE 5 MG/1
5 TABLET ORAL ONCE
Status: CANCELLED | OUTPATIENT
Start: 2023-02-10 | End: 2023-02-10

## 2023-02-10 RX ADMIN — PALONOSETRON 0.25 MG: 0.05 INJECTION, SOLUTION INTRAVENOUS at 09:06

## 2023-02-10 RX ADMIN — DIPHENHYDRAMINE HYDROCHLORIDE 25 MG: 50 INJECTION, SOLUTION INTRAMUSCULAR; INTRAVENOUS at 10:01

## 2023-02-10 RX ADMIN — Medication 10 ML: at 12:44

## 2023-02-10 RX ADMIN — Medication 500 UNITS: at 12:44

## 2023-02-10 RX ADMIN — OLANZAPINE 5 MG: 5 TABLET, FILM COATED ORAL at 09:06

## 2023-02-10 RX ADMIN — SACITUZUMAB GOVITECAN 460 MG: 180 POWDER, FOR SOLUTION INTRAVENOUS at 11:25

## 2023-02-10 RX ADMIN — ACETAMINOPHEN 650 MG: 325 TABLET ORAL at 09:06

## 2023-02-10 RX ADMIN — FAMOTIDINE 20 MG: 10 INJECTION INTRAVENOUS at 11:04

## 2023-02-10 RX ADMIN — DEXAMETHASONE SODIUM PHOSPHATE 12 MG: 10 INJECTION, SOLUTION INTRAMUSCULAR; INTRAVENOUS at 10:29

## 2023-02-10 RX ADMIN — SODIUM CHLORIDE 250 ML: 9 INJECTION, SOLUTION INTRAVENOUS at 09:06

## 2023-02-10 RX ADMIN — FOSAPREPITANT 100 ML: 150 INJECTION, POWDER, LYOPHILIZED, FOR SOLUTION INTRAVENOUS at 09:16

## 2023-02-17 ENCOUNTER — INFUSION (OUTPATIENT)
Dept: ONCOLOGY | Facility: HOSPITAL | Age: 63
End: 2023-02-17
Payer: OTHER GOVERNMENT

## 2023-02-17 VITALS
TEMPERATURE: 98.8 F | OXYGEN SATURATION: 94 % | SYSTOLIC BLOOD PRESSURE: 122 MMHG | HEART RATE: 87 BPM | DIASTOLIC BLOOD PRESSURE: 56 MMHG | RESPIRATION RATE: 20 BRPM

## 2023-02-17 DIAGNOSIS — C50.919 MALIGNANT NEOPLASM OF FEMALE BREAST, UNSPECIFIED ESTROGEN RECEPTOR STATUS, UNSPECIFIED LATERALITY, UNSPECIFIED SITE OF BREAST: Primary | ICD-10-CM

## 2023-02-17 LAB
ALBUMIN SERPL-MCNC: 3.7 G/DL (ref 3.5–5.2)
ALBUMIN/GLOB SERPL: 1.7 G/DL
ALP SERPL-CCNC: 84 U/L (ref 39–117)
ALT SERPL W P-5'-P-CCNC: 9 U/L (ref 1–33)
ANION GAP SERPL CALCULATED.3IONS-SCNC: 3 MMOL/L (ref 5–15)
AST SERPL-CCNC: 14 U/L (ref 1–32)
BASOPHILS # BLD AUTO: 0.02 10*3/MM3 (ref 0–0.2)
BASOPHILS NFR BLD AUTO: 0.5 % (ref 0–1.5)
BILIRUB SERPL-MCNC: 0.2 MG/DL (ref 0–1.2)
BUN SERPL-MCNC: 8 MG/DL (ref 8–23)
BUN/CREAT SERPL: 13.8 (ref 7–25)
CALCIUM SPEC-SCNC: 8.6 MG/DL (ref 8.6–10.5)
CHLORIDE SERPL-SCNC: 106 MMOL/L (ref 98–107)
CO2 SERPL-SCNC: 27 MMOL/L (ref 22–29)
CREAT SERPL-MCNC: 0.58 MG/DL (ref 0.57–1)
DEPRECATED RDW RBC AUTO: 51.8 FL (ref 37–54)
EGFRCR SERPLBLD CKD-EPI 2021: 101.2 ML/MIN/1.73
EOSINOPHIL # BLD AUTO: 0.05 10*3/MM3 (ref 0–0.4)
EOSINOPHIL NFR BLD AUTO: 1.3 % (ref 0.3–6.2)
ERYTHROCYTE [DISTWIDTH] IN BLOOD BY AUTOMATED COUNT: 14.7 % (ref 12.3–15.4)
GLOBULIN UR ELPH-MCNC: 2.2 GM/DL
GLUCOSE SERPL-MCNC: 104 MG/DL (ref 65–99)
HCT VFR BLD AUTO: 29.7 % (ref 34–46.6)
HGB BLD-MCNC: 9.6 G/DL (ref 12–15.9)
HOLD SPECIMEN: NORMAL
HOLD SPECIMEN: NORMAL
IMM GRANULOCYTES # BLD AUTO: 0.04 10*3/MM3 (ref 0–0.05)
IMM GRANULOCYTES NFR BLD AUTO: 1.1 % (ref 0–0.5)
LYMPHOCYTES # BLD AUTO: 0.53 10*3/MM3 (ref 0.7–3.1)
LYMPHOCYTES NFR BLD AUTO: 14.2 % (ref 19.6–45.3)
MCH RBC QN AUTO: 31.5 PG (ref 26.6–33)
MCHC RBC AUTO-ENTMCNC: 32.3 G/DL (ref 31.5–35.7)
MCV RBC AUTO: 97.4 FL (ref 79–97)
MONOCYTES # BLD AUTO: 0.4 10*3/MM3 (ref 0.1–0.9)
MONOCYTES NFR BLD AUTO: 10.7 % (ref 5–12)
NEUTROPHILS NFR BLD AUTO: 2.69 10*3/MM3 (ref 1.7–7)
NEUTROPHILS NFR BLD AUTO: 72.2 % (ref 42.7–76)
NRBC BLD AUTO-RTO: 0 /100 WBC (ref 0–0.2)
PLATELET # BLD AUTO: 131 10*3/MM3 (ref 140–450)
PMV BLD AUTO: 9.9 FL (ref 6–12)
POTASSIUM SERPL-SCNC: 4.1 MMOL/L (ref 3.5–5.2)
PROT SERPL-MCNC: 5.9 G/DL (ref 6–8.5)
RBC # BLD AUTO: 3.05 10*6/MM3 (ref 3.77–5.28)
SODIUM SERPL-SCNC: 136 MMOL/L (ref 136–145)
WBC NRBC COR # BLD: 3.73 10*3/MM3 (ref 3.4–10.8)

## 2023-02-17 PROCEDURE — 96413 CHEMO IV INFUSION 1 HR: CPT | Performed by: INTERNAL MEDICINE

## 2023-02-17 PROCEDURE — 96375 TX/PRO/DX INJ NEW DRUG ADDON: CPT | Performed by: INTERNAL MEDICINE

## 2023-02-17 PROCEDURE — 25010000002 SACITUZUMAB GOVITECAN-HZIY 180 MG RECONSTITUTED SOLUTION 1 EACH VIAL: Performed by: INTERNAL MEDICINE

## 2023-02-17 PROCEDURE — 25010000002 HEPARIN LOCK FLUSH PER 10 UNITS: Performed by: INTERNAL MEDICINE

## 2023-02-17 PROCEDURE — 25010000002 DIPHENHYDRAMINE PER 50 MG: Performed by: INTERNAL MEDICINE

## 2023-02-17 PROCEDURE — 25010000002 PALONOSETRON PER 25 MCG: Performed by: INTERNAL MEDICINE

## 2023-02-17 PROCEDURE — 36415 COLL VENOUS BLD VENIPUNCTURE: CPT

## 2023-02-17 PROCEDURE — 80053 COMPREHEN METABOLIC PANEL: CPT

## 2023-02-17 PROCEDURE — 25010000002 DEXAMETHASONE SODIUM PHOSPHATE 100 MG/10ML SOLUTION: Performed by: INTERNAL MEDICINE

## 2023-02-17 PROCEDURE — 96367 TX/PROPH/DG ADDL SEQ IV INF: CPT | Performed by: INTERNAL MEDICINE

## 2023-02-17 PROCEDURE — 85025 COMPLETE CBC W/AUTO DIFF WBC: CPT

## 2023-02-17 PROCEDURE — 25010000002 FOSAPREPITANT PER 1 MG: Performed by: INTERNAL MEDICINE

## 2023-02-17 RX ORDER — DIPHENHYDRAMINE HYDROCHLORIDE 50 MG/ML
50 INJECTION INTRAMUSCULAR; INTRAVENOUS AS NEEDED
Status: DISCONTINUED | OUTPATIENT
Start: 2023-02-17 | End: 2023-02-17 | Stop reason: HOSPADM

## 2023-02-17 RX ORDER — SODIUM CHLORIDE 0.9 % (FLUSH) 0.9 %
10 SYRINGE (ML) INJECTION AS NEEDED
Status: CANCELLED | OUTPATIENT
Start: 2023-02-17

## 2023-02-17 RX ORDER — SODIUM CHLORIDE 9 MG/ML
250 INJECTION, SOLUTION INTRAVENOUS ONCE
Status: COMPLETED | OUTPATIENT
Start: 2023-02-17 | End: 2023-02-17

## 2023-02-17 RX ORDER — SODIUM CHLORIDE 0.9 % (FLUSH) 0.9 %
10 SYRINGE (ML) INJECTION AS NEEDED
Status: DISCONTINUED | OUTPATIENT
Start: 2023-02-17 | End: 2023-02-17 | Stop reason: HOSPADM

## 2023-02-17 RX ORDER — FAMOTIDINE 10 MG/ML
20 INJECTION, SOLUTION INTRAVENOUS ONCE
Status: COMPLETED | OUTPATIENT
Start: 2023-02-17 | End: 2023-02-17

## 2023-02-17 RX ORDER — OLANZAPINE 5 MG/1
5 TABLET ORAL ONCE
Status: COMPLETED | OUTPATIENT
Start: 2023-02-17 | End: 2023-02-17

## 2023-02-17 RX ORDER — FAMOTIDINE 10 MG/ML
20 INJECTION, SOLUTION INTRAVENOUS AS NEEDED
Status: DISCONTINUED | OUTPATIENT
Start: 2023-02-17 | End: 2023-02-17 | Stop reason: HOSPADM

## 2023-02-17 RX ORDER — HEPARIN SODIUM (PORCINE) LOCK FLUSH IV SOLN 100 UNIT/ML 100 UNIT/ML
500 SOLUTION INTRAVENOUS AS NEEDED
Status: DISCONTINUED | OUTPATIENT
Start: 2023-02-17 | End: 2023-02-17 | Stop reason: HOSPADM

## 2023-02-17 RX ORDER — HEPARIN SODIUM (PORCINE) LOCK FLUSH IV SOLN 100 UNIT/ML 100 UNIT/ML
500 SOLUTION INTRAVENOUS AS NEEDED
Status: CANCELLED | OUTPATIENT
Start: 2023-02-17

## 2023-02-17 RX ORDER — ACETAMINOPHEN 325 MG/1
650 TABLET ORAL ONCE
Status: COMPLETED | OUTPATIENT
Start: 2023-02-17 | End: 2023-02-17

## 2023-02-17 RX ORDER — ATROPINE SULFATE 1 MG/ML
0.25 INJECTION, SOLUTION INTRAMUSCULAR; INTRAVENOUS; SUBCUTANEOUS
Status: DISCONTINUED | OUTPATIENT
Start: 2023-02-17 | End: 2023-02-17 | Stop reason: HOSPADM

## 2023-02-17 RX ORDER — PALONOSETRON 0.05 MG/ML
0.25 INJECTION, SOLUTION INTRAVENOUS ONCE
Status: COMPLETED | OUTPATIENT
Start: 2023-02-17 | End: 2023-02-17

## 2023-02-17 RX ADMIN — DIPHENHYDRAMINE HYDROCHLORIDE 25 MG: 50 INJECTION, SOLUTION INTRAMUSCULAR; INTRAVENOUS at 10:12

## 2023-02-17 RX ADMIN — SODIUM CHLORIDE 250 ML: 9 INJECTION, SOLUTION INTRAVENOUS at 09:07

## 2023-02-17 RX ADMIN — FOSAPREPITANT 100 ML: 150 INJECTION, POWDER, LYOPHILIZED, FOR SOLUTION INTRAVENOUS at 09:31

## 2023-02-17 RX ADMIN — PALONOSETRON 0.25 MG: 0.05 INJECTION, SOLUTION INTRAVENOUS at 09:08

## 2023-02-17 RX ADMIN — DEXAMETHASONE SODIUM PHOSPHATE 12 MG: 10 INJECTION, SOLUTION INTRAMUSCULAR; INTRAVENOUS at 10:49

## 2023-02-17 RX ADMIN — SACITUZUMAB GOVITECAN 460 MG: 180 POWDER, FOR SOLUTION INTRAVENOUS at 11:27

## 2023-02-17 RX ADMIN — FAMOTIDINE 20 MG: 10 INJECTION INTRAVENOUS at 09:14

## 2023-02-17 RX ADMIN — OLANZAPINE 5 MG: 5 TABLET, FILM COATED ORAL at 09:08

## 2023-02-17 RX ADMIN — HEPARIN 500 UNITS: 100 SYRINGE at 12:42

## 2023-02-17 RX ADMIN — ACETAMINOPHEN 650 MG: 325 TABLET ORAL at 10:12

## 2023-02-17 RX ADMIN — Medication 10 ML: at 12:42

## 2023-02-20 ENCOUNTER — TELEPHONE (OUTPATIENT)
Dept: ONCOLOGY | Facility: CLINIC | Age: 63
End: 2023-02-20
Payer: OTHER GOVERNMENT

## 2023-02-21 NOTE — TELEPHONE ENCOUNTER
Contacted pt and advised per Dr Troncoso. PT verbalizes understanding and denies any further questions.

## 2023-02-21 NOTE — TELEPHONE ENCOUNTER
Spoke with pt who is thinking she will have a PET scan in a couple of weeks. PT will be traveling 3/11-3/22. Wanting to make sure this would not interfere.

## 2023-02-27 DIAGNOSIS — G89.3 CANCER ASSOCIATED PAIN: ICD-10-CM

## 2023-02-27 RX ORDER — MORPHINE SULFATE 15 MG/1
30 TABLET, FILM COATED, EXTENDED RELEASE ORAL 2 TIMES DAILY
Qty: 90 TABLET | Refills: 0 | Status: SHIPPED | OUTPATIENT
Start: 2023-02-27 | End: 2023-03-03 | Stop reason: SDUPTHER

## 2023-02-27 NOTE — TELEPHONE ENCOUNTER
Rx Refill Note  Requested Prescriptions     Pending Prescriptions Disp Refills   • Morphine (MS CONTIN) 15 MG 12 hr tablet 90 tablet 0     Sig: Take 2 tablets by mouth 2 (Two) Times a Day.      Last office visit with prescribing clinician: 2/10/2023   Last telemedicine visit with prescribing clinician: 3/3/2023   Next office visit with prescribing clinician: 3/3/2023                         Would you like a call back once the refill request has been completed: [] Yes [] No    If the office needs to give you a call back, can they leave a voicemail: [] Yes [] No    Sasha Carolina, [facility] Rep  02/27/23, 09:31 CST

## 2023-03-02 NOTE — PROGRESS NOTES
Subjective     Radha Duarte was seen in follow-up for metastatic breast cancer.  She is overall stable.  Feeling well.  No new aches or pains.  Chronic pain has been stable on MS Contin.  No nausea or emesis.  No diarrhea.      Past Medical History, Past Surgical History, Social History, Family History have been reviewed and are without significant changes except as mentioned.        Medications:  The current medication list was reviewed in the EMR    ALLERGIES:    Allergies   Allergen Reactions   • Percocet [Oxycodone-Acetaminophen] Nausea And Vomiting       Objective      Vitals:    03/03/23 0840   BP: 128/59   Pulse: 92   Temp: 97.2 °F (36.2 °C)   SpO2: 93%         Current Status 2/17/2023   ECOG score 0       Physical Exam  Vitals and nursing note reviewed.   Constitutional:       Appearance: Normal appearance.   Abdominal:      General: There is no distension.      Palpations: Abdomen is soft. There is no mass.      Tenderness: There is no abdominal tenderness.   Skin:     General: Skin is dry.      Coloration: Skin is pale.      Findings: Bruising present.   Neurological:      General: No focal deficit present.      Mental Status: She is alert and oriented to person, place, and time. Mental status is at baseline.   Psychiatric:         Mood and Affect: Mood normal.         Behavior: Behavior normal.         Thought Content: Thought content normal.               RECENT LABS:Independently reviewed and summarized  Hematology WBC   Date Value Ref Range Status   02/17/2023 3.73 3.40 - 10.80 10*3/mm3 Final     RBC   Date Value Ref Range Status   02/17/2023 3.05 (L) 3.77 - 5.28 10*6/mm3 Final     Hemoglobin   Date Value Ref Range Status   02/17/2023 9.6 (L) 12.0 - 15.9 g/dL Final     Hematocrit   Date Value Ref Range Status   02/17/2023 29.7 (L) 34.0 - 46.6 % Final     Platelets   Date Value Ref Range Status   02/17/2023 131 (L) 140 - 450 10*3/mm3 Final       WBC   Date Value Ref Range Status   03/03/2023  3.67 3.40 - 10.80 10*3/mm3 Final     RBC   Date Value Ref Range Status   03/03/2023 3.24 (L) 3.77 - 5.28 10*6/mm3 Final     Hemoglobin   Date Value Ref Range Status   03/03/2023 10.0 (L) 12.0 - 15.9 g/dL Final     Hematocrit   Date Value Ref Range Status   03/03/2023 30.5 (L) 34.0 - 46.6 % Final     MCV   Date Value Ref Range Status   03/03/2023 94.1 79.0 - 97.0 fL Final     MCH   Date Value Ref Range Status   03/03/2023 30.9 26.6 - 33.0 pg Final     MCHC   Date Value Ref Range Status   03/03/2023 32.8 31.5 - 35.7 g/dL Final     RDW   Date Value Ref Range Status   03/03/2023 14.6 12.3 - 15.4 % Final     RDW-SD   Date Value Ref Range Status   03/03/2023 51.1 37.0 - 54.0 fl Final     MPV   Date Value Ref Range Status   03/03/2023 9.1 6.0 - 12.0 fL Final     Platelets   Date Value Ref Range Status   03/03/2023 140 140 - 450 10*3/mm3 Final     Neutrophil %   Date Value Ref Range Status   03/03/2023 70.5 42.7 - 76.0 % Final     Lymphocyte %   Date Value Ref Range Status   03/03/2023 14.2 (L) 19.6 - 45.3 % Final     Monocyte %   Date Value Ref Range Status   03/03/2023 13.1 (H) 5.0 - 12.0 % Final     Eosinophil %   Date Value Ref Range Status   03/03/2023 1.4 0.3 - 6.2 % Final     Basophil %   Date Value Ref Range Status   03/03/2023 0.5 0.0 - 1.5 % Final     Immature Grans %   Date Value Ref Range Status   03/03/2023 0.3 0.0 - 0.5 % Final     Neutrophils, Absolute   Date Value Ref Range Status   03/03/2023 2.59 1.70 - 7.00 10*3/mm3 Final     Lymphocytes, Absolute   Date Value Ref Range Status   03/03/2023 0.52 (L) 0.70 - 3.10 10*3/mm3 Final     Monocytes, Absolute   Date Value Ref Range Status   03/03/2023 0.48 0.10 - 0.90 10*3/mm3 Final     Eosinophils, Absolute   Date Value Ref Range Status   03/03/2023 0.05 0.00 - 0.40 10*3/mm3 Final     Basophils, Absolute   Date Value Ref Range Status   03/03/2023 0.02 0.00 - 0.20 10*3/mm3 Final     Immature Grans, Absolute   Date Value Ref Range Status   03/03/2023 0.01 0.00 - 0.05  10*3/mm3 Final     nRBC   Date Value Ref Range Status   03/03/2023 0.0 0.0 - 0.2 /100 WBC Final     Lab Results   Component Value Date    GLUCOSE 87 03/03/2023    BUN 8 03/03/2023    CREATININE 0.57 03/03/2023    EGFR 101.6 03/03/2023    BCR 14.0 03/03/2023    K 4.3 03/03/2023    CO2 25.0 03/03/2023    CALCIUM 8.9 03/03/2023    ALBUMIN 3.6 03/03/2023    AST 14 03/03/2023    ALT 6 03/03/2023            Radha Duarte reports a pain score of 0.  Given her pain assessment as noted, treatment options were discussed and the following options were decided upon as a follow-up plan to address the patient's pain: continuation of current treatment plan for pain.    Patient screened negative for depression based on a PHQ-9 score of 0 on 3/3/2023.       Diagnosis:    (1) Metastatic breast cancer with distant LN (mediastinal) metastases   Triple negative   Unable to do perform foundation one due to limited tissue      Current therapy:   Weekly carboplatin/paclitaxel     (4/22/22- 7/7/22)      Discontinue to pain peripheral neuropathy in lower extremities.      Switch to XELODA (7//22/22)      PET scan on 9/19/22 showed progressive disease, mainly in right breast/chest wall/regional nodes.      Started on trodelvy.   D1C1: 10/4/22      Palliative RT to right breast/LN.      D1C2: 11/4/22  D8C2: 11/11/22   D1C3: 12/9/22  D8C3: 12/16/22      PET scan on 12/19/22 showed favorable response to treatment. BETO.      D1C4: 12/30/22   D8C4: 1/6/23     D1C5: 1/20/23   D8C5: 1/27/23      D1C6: 2/10/23   D8C6: 2/17/23     D1C7: 3/3/23     (2) Cancer associated pain   (3) Right upper extremity DVT   (4) Mucositis   (5) Unintentional weight loss   (6) Chemotherapy induce peripheral neuropathy     Assessment & Plan       (1) Metastatic breast cancer with distant LN (mediastinal) metastases     Chronic, stable  She is currently on Trodelvy.  Tolerating treatment well without any major side effects.  Feels well.  Labs look stable.  Day 1  cycle 7 today we will sign on today's visit.  CBC, CMP in 1 week prior to day 8 cycle 7.  I will order PET scan in 3 weeks to evaluate response to treatment.  She is planning 1 week of vacation so she will get extra break.    (2) Cancer associated pain     Chronic, stable  She is on MS Contin 30 mg twice a day.  She will continue this.    (3) Right upper extremity DVT     Chronic, stable.  She is on Eliquis.  No new concern for bleeding or thrombosis.  Continue close monitoring on chemotherapy.    (4) Mucositis     Chronic, stable  Magic mouthwash as needed.    (5) Unintentional weight loss     Chronic, stable  Recommend high-calorie high-protein diet.    (6) Chemotherapy induce peripheral neuropathy     Chronic, stable  She is on Neurontin 400 mg 3 times daily.  She will continue this.    3/2/2023      CC:

## 2023-03-03 ENCOUNTER — INFUSION (OUTPATIENT)
Dept: ONCOLOGY | Facility: HOSPITAL | Age: 63
End: 2023-03-03
Payer: OTHER GOVERNMENT

## 2023-03-03 ENCOUNTER — OFFICE VISIT (OUTPATIENT)
Dept: ONCOLOGY | Facility: CLINIC | Age: 63
End: 2023-03-03
Payer: OTHER GOVERNMENT

## 2023-03-03 VITALS
WEIGHT: 124 LBS | DIASTOLIC BLOOD PRESSURE: 59 MMHG | BODY MASS INDEX: 21.97 KG/M2 | HEART RATE: 92 BPM | TEMPERATURE: 97.2 F | SYSTOLIC BLOOD PRESSURE: 128 MMHG | OXYGEN SATURATION: 93 %

## 2023-03-03 DIAGNOSIS — G89.3 CANCER ASSOCIATED PAIN: ICD-10-CM

## 2023-03-03 DIAGNOSIS — I82.A12 ACUTE DEEP VEIN THROMBOSIS (DVT) OF AXILLARY VEIN OF LEFT UPPER EXTREMITY: ICD-10-CM

## 2023-03-03 DIAGNOSIS — C50.919 MALIGNANT NEOPLASM OF FEMALE BREAST, UNSPECIFIED ESTROGEN RECEPTOR STATUS, UNSPECIFIED LATERALITY, UNSPECIFIED SITE OF BREAST: Primary | ICD-10-CM

## 2023-03-03 LAB
ALBUMIN SERPL-MCNC: 3.6 G/DL (ref 3.5–5.2)
ALBUMIN/GLOB SERPL: 1.4 G/DL
ALP SERPL-CCNC: 82 U/L (ref 39–117)
ALT SERPL W P-5'-P-CCNC: 6 U/L (ref 1–33)
ANION GAP SERPL CALCULATED.3IONS-SCNC: 9 MMOL/L (ref 5–15)
AST SERPL-CCNC: 14 U/L (ref 1–32)
BASOPHILS # BLD AUTO: 0.02 10*3/MM3 (ref 0–0.2)
BASOPHILS NFR BLD AUTO: 0.5 % (ref 0–1.5)
BILIRUB SERPL-MCNC: 0.4 MG/DL (ref 0–1.2)
BUN SERPL-MCNC: 8 MG/DL (ref 8–23)
BUN/CREAT SERPL: 14 (ref 7–25)
CALCIUM SPEC-SCNC: 8.9 MG/DL (ref 8.6–10.5)
CHLORIDE SERPL-SCNC: 106 MMOL/L (ref 98–107)
CO2 SERPL-SCNC: 25 MMOL/L (ref 22–29)
CREAT SERPL-MCNC: 0.57 MG/DL (ref 0.57–1)
DEPRECATED RDW RBC AUTO: 51.1 FL (ref 37–54)
EGFRCR SERPLBLD CKD-EPI 2021: 101.6 ML/MIN/1.73
EOSINOPHIL # BLD AUTO: 0.05 10*3/MM3 (ref 0–0.4)
EOSINOPHIL NFR BLD AUTO: 1.4 % (ref 0.3–6.2)
ERYTHROCYTE [DISTWIDTH] IN BLOOD BY AUTOMATED COUNT: 14.6 % (ref 12.3–15.4)
GLOBULIN UR ELPH-MCNC: 2.6 GM/DL
GLUCOSE SERPL-MCNC: 87 MG/DL (ref 65–99)
HCT VFR BLD AUTO: 30.5 % (ref 34–46.6)
HGB BLD-MCNC: 10 G/DL (ref 12–15.9)
HOLD SPECIMEN: NORMAL
IMM GRANULOCYTES # BLD AUTO: 0.01 10*3/MM3 (ref 0–0.05)
IMM GRANULOCYTES NFR BLD AUTO: 0.3 % (ref 0–0.5)
LYMPHOCYTES # BLD AUTO: 0.52 10*3/MM3 (ref 0.7–3.1)
LYMPHOCYTES NFR BLD AUTO: 14.2 % (ref 19.6–45.3)
MCH RBC QN AUTO: 30.9 PG (ref 26.6–33)
MCHC RBC AUTO-ENTMCNC: 32.8 G/DL (ref 31.5–35.7)
MCV RBC AUTO: 94.1 FL (ref 79–97)
MONOCYTES # BLD AUTO: 0.48 10*3/MM3 (ref 0.1–0.9)
MONOCYTES NFR BLD AUTO: 13.1 % (ref 5–12)
NEUTROPHILS NFR BLD AUTO: 2.59 10*3/MM3 (ref 1.7–7)
NEUTROPHILS NFR BLD AUTO: 70.5 % (ref 42.7–76)
NRBC BLD AUTO-RTO: 0 /100 WBC (ref 0–0.2)
PLATELET # BLD AUTO: 140 10*3/MM3 (ref 140–450)
PMV BLD AUTO: 9.1 FL (ref 6–12)
POTASSIUM SERPL-SCNC: 4.3 MMOL/L (ref 3.5–5.2)
PROT SERPL-MCNC: 6.2 G/DL (ref 6–8.5)
RBC # BLD AUTO: 3.24 10*6/MM3 (ref 3.77–5.28)
SODIUM SERPL-SCNC: 140 MMOL/L (ref 136–145)
WBC NRBC COR # BLD: 3.67 10*3/MM3 (ref 3.4–10.8)

## 2023-03-03 PROCEDURE — 36415 COLL VENOUS BLD VENIPUNCTURE: CPT

## 2023-03-03 PROCEDURE — 99214 OFFICE O/P EST MOD 30 MIN: CPT | Performed by: INTERNAL MEDICINE

## 2023-03-03 PROCEDURE — 25010000002 PALONOSETRON PER 25 MCG: Performed by: INTERNAL MEDICINE

## 2023-03-03 PROCEDURE — 25010000002 SACITUZUMAB GOVITECAN-HZIY 180 MG RECONSTITUTED SOLUTION 1 EACH VIAL: Performed by: INTERNAL MEDICINE

## 2023-03-03 PROCEDURE — 25010000002 FOSAPREPITANT PER 1 MG: Performed by: INTERNAL MEDICINE

## 2023-03-03 PROCEDURE — 25010000002 HEPARIN LOCK FLUSH PER 10 UNITS: Performed by: INTERNAL MEDICINE

## 2023-03-03 PROCEDURE — 96375 TX/PRO/DX INJ NEW DRUG ADDON: CPT | Performed by: INTERNAL MEDICINE

## 2023-03-03 PROCEDURE — 96367 TX/PROPH/DG ADDL SEQ IV INF: CPT | Performed by: INTERNAL MEDICINE

## 2023-03-03 PROCEDURE — 25010000002 DIPHENHYDRAMINE PER 50 MG: Performed by: INTERNAL MEDICINE

## 2023-03-03 PROCEDURE — 25010000002 DEXAMETHASONE SODIUM PHOSPHATE 100 MG/10ML SOLUTION: Performed by: INTERNAL MEDICINE

## 2023-03-03 PROCEDURE — 96413 CHEMO IV INFUSION 1 HR: CPT | Performed by: INTERNAL MEDICINE

## 2023-03-03 PROCEDURE — 80053 COMPREHEN METABOLIC PANEL: CPT

## 2023-03-03 PROCEDURE — 85025 COMPLETE CBC W/AUTO DIFF WBC: CPT

## 2023-03-03 RX ORDER — FAMOTIDINE 10 MG/ML
20 INJECTION, SOLUTION INTRAVENOUS ONCE
Status: COMPLETED | OUTPATIENT
Start: 2023-03-03 | End: 2023-03-03

## 2023-03-03 RX ORDER — DIPHENHYDRAMINE HYDROCHLORIDE 50 MG/ML
50 INJECTION INTRAMUSCULAR; INTRAVENOUS AS NEEDED
Status: DISCONTINUED | OUTPATIENT
Start: 2023-03-03 | End: 2023-03-03 | Stop reason: HOSPADM

## 2023-03-03 RX ORDER — OLANZAPINE 5 MG/1
5 TABLET ORAL ONCE
Status: CANCELLED | OUTPATIENT
Start: 2023-03-10 | End: 2023-03-10

## 2023-03-03 RX ORDER — SODIUM CHLORIDE 9 MG/ML
250 INJECTION, SOLUTION INTRAVENOUS ONCE
Status: CANCELLED | OUTPATIENT
Start: 2023-03-10

## 2023-03-03 RX ORDER — OLANZAPINE 5 MG/1
5 TABLET ORAL ONCE
Status: COMPLETED | OUTPATIENT
Start: 2023-03-03 | End: 2023-03-03

## 2023-03-03 RX ORDER — ACETAMINOPHEN 325 MG/1
650 TABLET ORAL ONCE
Status: CANCELLED | OUTPATIENT
Start: 2023-03-03

## 2023-03-03 RX ORDER — ATROPINE SULFATE 1 MG/ML
0.25 INJECTION, SOLUTION INTRAMUSCULAR; INTRAVENOUS; SUBCUTANEOUS
Status: DISCONTINUED | OUTPATIENT
Start: 2023-03-03 | End: 2023-03-03 | Stop reason: HOSPADM

## 2023-03-03 RX ORDER — FAMOTIDINE 10 MG/ML
20 INJECTION, SOLUTION INTRAVENOUS ONCE
Status: CANCELLED | OUTPATIENT
Start: 2023-03-10

## 2023-03-03 RX ORDER — FAMOTIDINE 10 MG/ML
20 INJECTION, SOLUTION INTRAVENOUS AS NEEDED
Status: CANCELLED | OUTPATIENT
Start: 2023-03-10

## 2023-03-03 RX ORDER — ACETAMINOPHEN 325 MG/1
650 TABLET ORAL ONCE
Status: COMPLETED | OUTPATIENT
Start: 2023-03-03 | End: 2023-03-03

## 2023-03-03 RX ORDER — SODIUM CHLORIDE 0.9 % (FLUSH) 0.9 %
10 SYRINGE (ML) INJECTION AS NEEDED
Status: CANCELLED | OUTPATIENT
Start: 2023-03-03

## 2023-03-03 RX ORDER — PALONOSETRON 0.05 MG/ML
0.25 INJECTION, SOLUTION INTRAVENOUS ONCE
Status: CANCELLED | OUTPATIENT
Start: 2023-03-03

## 2023-03-03 RX ORDER — PALONOSETRON 0.05 MG/ML
0.25 INJECTION, SOLUTION INTRAVENOUS ONCE
Status: CANCELLED | OUTPATIENT
Start: 2023-03-10

## 2023-03-03 RX ORDER — SODIUM CHLORIDE 9 MG/ML
250 INJECTION, SOLUTION INTRAVENOUS ONCE
Status: CANCELLED | OUTPATIENT
Start: 2023-03-03

## 2023-03-03 RX ORDER — HEPARIN SODIUM (PORCINE) LOCK FLUSH IV SOLN 100 UNIT/ML 100 UNIT/ML
500 SOLUTION INTRAVENOUS AS NEEDED
Status: DISCONTINUED | OUTPATIENT
Start: 2023-03-03 | End: 2023-03-03 | Stop reason: HOSPADM

## 2023-03-03 RX ORDER — ATROPINE SULFATE 1 MG/ML
0.25 INJECTION, SOLUTION INTRAMUSCULAR; INTRAVENOUS; SUBCUTANEOUS
Status: CANCELLED | OUTPATIENT
Start: 2023-03-10

## 2023-03-03 RX ORDER — SODIUM CHLORIDE 9 MG/ML
250 INJECTION, SOLUTION INTRAVENOUS ONCE
Status: COMPLETED | OUTPATIENT
Start: 2023-03-03 | End: 2023-03-03

## 2023-03-03 RX ORDER — FAMOTIDINE 10 MG/ML
20 INJECTION, SOLUTION INTRAVENOUS AS NEEDED
Status: CANCELLED | OUTPATIENT
Start: 2023-03-03

## 2023-03-03 RX ORDER — DIPHENHYDRAMINE HYDROCHLORIDE 50 MG/ML
50 INJECTION INTRAMUSCULAR; INTRAVENOUS AS NEEDED
Status: CANCELLED | OUTPATIENT
Start: 2023-03-10

## 2023-03-03 RX ORDER — OLANZAPINE 5 MG/1
5 TABLET ORAL ONCE
Status: CANCELLED | OUTPATIENT
Start: 2023-03-03 | End: 2023-03-03

## 2023-03-03 RX ORDER — PALONOSETRON 0.05 MG/ML
0.25 INJECTION, SOLUTION INTRAVENOUS ONCE
Status: COMPLETED | OUTPATIENT
Start: 2023-03-03 | End: 2023-03-03

## 2023-03-03 RX ORDER — MORPHINE SULFATE 15 MG/1
30 TABLET, FILM COATED, EXTENDED RELEASE ORAL 2 TIMES DAILY
Qty: 90 TABLET | Refills: 0 | Status: SHIPPED | OUTPATIENT
Start: 2023-03-03 | End: 2023-03-29 | Stop reason: SDUPTHER

## 2023-03-03 RX ORDER — DIPHENHYDRAMINE HYDROCHLORIDE 50 MG/ML
50 INJECTION INTRAMUSCULAR; INTRAVENOUS AS NEEDED
Status: CANCELLED | OUTPATIENT
Start: 2023-03-03

## 2023-03-03 RX ORDER — SODIUM CHLORIDE 0.9 % (FLUSH) 0.9 %
10 SYRINGE (ML) INJECTION AS NEEDED
Status: DISCONTINUED | OUTPATIENT
Start: 2023-03-03 | End: 2023-03-03 | Stop reason: HOSPADM

## 2023-03-03 RX ORDER — ATROPINE SULFATE 1 MG/ML
0.25 INJECTION, SOLUTION INTRAMUSCULAR; INTRAVENOUS; SUBCUTANEOUS
Status: CANCELLED | OUTPATIENT
Start: 2023-03-03

## 2023-03-03 RX ORDER — FAMOTIDINE 10 MG/ML
20 INJECTION, SOLUTION INTRAVENOUS AS NEEDED
Status: DISCONTINUED | OUTPATIENT
Start: 2023-03-03 | End: 2023-03-03 | Stop reason: HOSPADM

## 2023-03-03 RX ORDER — HEPARIN SODIUM (PORCINE) LOCK FLUSH IV SOLN 100 UNIT/ML 100 UNIT/ML
500 SOLUTION INTRAVENOUS AS NEEDED
Status: CANCELLED | OUTPATIENT
Start: 2023-03-03

## 2023-03-03 RX ORDER — FAMOTIDINE 10 MG/ML
20 INJECTION, SOLUTION INTRAVENOUS ONCE
Status: CANCELLED | OUTPATIENT
Start: 2023-03-03

## 2023-03-03 RX ORDER — ACETAMINOPHEN 325 MG/1
650 TABLET ORAL ONCE
Status: CANCELLED | OUTPATIENT
Start: 2023-03-10

## 2023-03-03 RX ADMIN — PALONOSETRON HYDROCHLORIDE 0.25 MG: 0.25 INJECTION, SOLUTION INTRAVENOUS at 09:40

## 2023-03-03 RX ADMIN — DEXAMETHASONE SODIUM PHOSPHATE 12 MG: 10 INJECTION, SOLUTION INTRAMUSCULAR; INTRAVENOUS at 10:46

## 2023-03-03 RX ADMIN — DIPHENHYDRAMINE HYDROCHLORIDE 25 MG: 50 INJECTION, SOLUTION INTRAMUSCULAR; INTRAVENOUS at 10:27

## 2023-03-03 RX ADMIN — Medication 500 UNITS: at 12:42

## 2023-03-03 RX ADMIN — FAMOTIDINE 20 MG: 10 INJECTION INTRAVENOUS at 11:12

## 2023-03-03 RX ADMIN — SACITUZUMAB GOVITECAN 460 MG: 180 POWDER, FOR SOLUTION INTRAVENOUS at 11:29

## 2023-03-03 RX ADMIN — Medication 10 ML: at 12:42

## 2023-03-03 RX ADMIN — FOSAPREPITANT 100 ML: 150 INJECTION, POWDER, LYOPHILIZED, FOR SOLUTION INTRAVENOUS at 09:46

## 2023-03-03 RX ADMIN — SODIUM CHLORIDE 250 ML: 9 INJECTION, SOLUTION INTRAVENOUS at 09:39

## 2023-03-03 RX ADMIN — OLANZAPINE 5 MG: 5 TABLET, FILM COATED ORAL at 09:40

## 2023-03-03 RX ADMIN — ACETAMINOPHEN 650 MG: 325 TABLET ORAL at 10:27

## 2023-03-07 NOTE — PROGRESS NOTES
Subjective   Radha Duarte is a 62 y.o. female.     History of Present Illness  CC: Establish care/annual exam-breast cancer, cancer associated pain and neuropathy, anemia, osteoporosis, hyperlipidemia  Cancer  This is a chronic problem. The current episode started more than 1 year ago. The problem occurs constantly. Pertinent negatives include no abdominal pain, anorexia, arthralgias, chest pain, chills, congestion, coughing, diaphoresis, fatigue, fever, myalgias, nausea, rash, sore throat, vomiting or weakness. Nothing aggravates the symptoms. Treatments tried: chemo and radiation. The treatment provided mild relief.   Pain  This is a chronic problem. The current episode started more than 1 year ago. The problem occurs daily. The problem has been waxing and waning. Pertinent negatives include no abdominal pain, anorexia, arthralgias, chest pain, chills, congestion, coughing, diaphoresis, fatigue, fever, myalgias, nausea, rash, sore throat, vomiting or weakness. Nothing aggravates the symptoms. Treatments tried: pain meds per oncology. The treatment provided moderate relief.   Anemia  Presents for follow-up visit. Symptoms include malaise/fatigue. There has been no abdominal pain, anorexia, bruising/bleeding easily, confusion, fever, leg swelling, light-headedness, pallor, palpitations, paresthesias, pica or weight loss. Signs of blood loss that are not present include hematemesis, hematochezia, melena, menorrhagia and vaginal bleeding. Past medical history includes cancer. There are no compliance problems.  Compliance with medications is %.   Hyperlipidemia  This is a chronic problem. Recent lipid tests were reviewed and are variable. Factors aggravating her hyperlipidemia include fatty foods. Pertinent negatives include no chest pain, focal sensory loss, focal weakness, leg pain, myalgias or shortness of breath. Current antihyperlipidemic treatment includes statins. The current treatment provides  significant improvement of lipids. Compliance problems include adherence to exercise and adherence to diet.  Risk factors for coronary artery disease include family history, dyslipidemia and a sedentary lifestyle.        The following portions of the patient's history were reviewed and updated as appropriate: allergies, current medications, past family history, past medical history, past social history, past surgical history and problem list.    Review of Systems   Constitutional: Positive for malaise/fatigue. Negative for activity change, appetite change, chills, diaphoresis, fatigue, fever, unexpected weight gain and unexpected weight loss.   HENT: Negative for congestion, sore throat, trouble swallowing and voice change.    Eyes: Negative for blurred vision, double vision, photophobia, pain and visual disturbance.   Respiratory: Negative for cough, chest tightness, shortness of breath and wheezing.    Cardiovascular: Negative for chest pain, palpitations and leg swelling.   Gastrointestinal: Negative for abdominal distention, abdominal pain, anal bleeding, anorexia, blood in stool, constipation, diarrhea, hematemesis, hematochezia, melena, nausea, vomiting, GERD and indigestion.   Endocrine: Negative for cold intolerance, heat intolerance, polydipsia, polyphagia and polyuria.   Genitourinary: Negative for dysuria, frequency, hematuria, menorrhagia, urgency and vaginal bleeding.   Musculoskeletal: Negative for arthralgias and myalgias.   Skin: Negative for pallor and rash.   Allergic/Immunologic: Negative.    Neurological: Negative for dizziness, focal weakness, syncope, weakness, light-headedness, headache, paresthesias and confusion.   Hematological: Negative.  Does not bruise/bleed easily.   Psychiatric/Behavioral: The patient is not nervous/anxious.        Objective   Physical Exam  Vitals and nursing note reviewed.   Constitutional:       General: She is not in acute distress.     Appearance: Normal  appearance. She is well-developed and normal weight. She is not ill-appearing, toxic-appearing or diaphoretic.   HENT:      Head: Normocephalic and atraumatic.      Right Ear: External ear normal.      Left Ear: External ear normal.      Nose: Nose normal.   Eyes:      Conjunctiva/sclera: Conjunctivae normal.      Pupils: Pupils are equal, round, and reactive to light.   Neck:      Thyroid: No thyromegaly.      Trachea: No tracheal deviation.   Cardiovascular:      Rate and Rhythm: Normal rate and regular rhythm.      Heart sounds: Normal heart sounds. No murmur heard.    No friction rub. No gallop.   Pulmonary:      Effort: Pulmonary effort is normal. No respiratory distress.      Breath sounds: Normal breath sounds. No stridor. No wheezing, rhonchi or rales.   Abdominal:      General: Bowel sounds are normal. There is no distension.      Palpations: Abdomen is soft. There is no mass.      Tenderness: There is no abdominal tenderness. There is no guarding or rebound.      Hernia: No hernia is present.   Musculoskeletal:         General: No tenderness. Normal range of motion.      Cervical back: Normal range of motion and neck supple.   Lymphadenopathy:      Cervical: No cervical adenopathy.   Skin:     General: Skin is warm and dry.      Coloration: Skin is not pale.      Findings: No erythema or rash.   Neurological:      Mental Status: She is alert and oriented to person, place, and time.      Cranial Nerves: No cranial nerve deficit.      Coordination: Coordination normal.   Psychiatric:         Mood and Affect: Mood normal.         Behavior: Behavior normal.         Thought Content: Thought content normal.         Judgment: Judgment normal.           Assessment & Plan   Diagnoses and all orders for this visit:    1. Encounter to establish care (Primary)    2. Annual physical exam  -     Hemoglobin A1c; Future  -     Lipid Panel; Future  -     TSH; Future  -     Hepatitis C Antibody; Future, will call with  results.    3. Malignant neoplasm of female breast, unspecified estrogen receptor status, unspecified laterality, unspecified site of breast (HCC)   - Continue routine follow-up with oncology as scheduled.    4. Cancer associated pain   -Josué reviewed and appropriate.  Managed by oncology.  Continue follow-up with oncology as scheduled.    5. Chemotherapy-induced peripheral neuropathy (HCC)   -Plan of care stated above #4.    6. Anemia due to other cause, not classified   -CBC reviewed.  Managed by hematology/oncology.  Continue follow-up as scheduled.  We will continue to monitor.    7. Need for hepatitis C screening test  -     Hepatitis C Antibody; Future, will call with results    8. Osteoporosis without current pathological fracture, unspecified osteoporosis type  -     ibandronate (BONIVA) 150 MG tablet; Take 1 tablet by mouth Every 30 (Thirty) Days. Take one tablet by mouth once monthly  Dispense: 6 tablet; Refill: 1   - Last DEXA scan on file reviewed.  Continue Boniva as prescribed.  We will continue to monitor.    9. Mixed hyperlipidemia  -     atorvastatin (LIPITOR) 20 MG tablet; Take 1 tablet by mouth Daily. Take one tablet by mouth at bedtime.  Dispense: 90 tablet; Refill: 3   -Lipid panel ordered.  Will call with results.  Continue Lipitor and low-fat diet.  We will continue to monitor.    10.  Follow-up in 6 months or sooner for any acute needs.            This document has been electronically signed by MINNIE Rhodes on January 12, 2023 14:58 CST                  Cimzia Counseling:  I discussed with the patient the risks of Cimzia including but not limited to immunosuppression, allergic reactions and infections.  The patient understands that monitoring is required including a PPD at baseline and must alert us or the primary physician if symptoms of infection or other concerning signs are noted.

## 2023-03-10 ENCOUNTER — INFUSION (OUTPATIENT)
Dept: ONCOLOGY | Facility: HOSPITAL | Age: 63
End: 2023-03-10
Payer: OTHER GOVERNMENT

## 2023-03-10 VITALS
SYSTOLIC BLOOD PRESSURE: 109 MMHG | TEMPERATURE: 98.3 F | RESPIRATION RATE: 16 BRPM | DIASTOLIC BLOOD PRESSURE: 54 MMHG | HEART RATE: 89 BPM

## 2023-03-10 DIAGNOSIS — C50.919 MALIGNANT NEOPLASM OF FEMALE BREAST, UNSPECIFIED ESTROGEN RECEPTOR STATUS, UNSPECIFIED LATERALITY, UNSPECIFIED SITE OF BREAST: ICD-10-CM

## 2023-03-10 DIAGNOSIS — C50.919 MALIGNANT NEOPLASM OF FEMALE BREAST, UNSPECIFIED ESTROGEN RECEPTOR STATUS, UNSPECIFIED LATERALITY, UNSPECIFIED SITE OF BREAST: Primary | ICD-10-CM

## 2023-03-10 LAB
ALBUMIN SERPL-MCNC: 3.7 G/DL (ref 3.5–5.2)
ALBUMIN/GLOB SERPL: 1.6 G/DL
ALP SERPL-CCNC: 86 U/L (ref 39–117)
ALT SERPL W P-5'-P-CCNC: 9 U/L (ref 1–33)
ANION GAP SERPL CALCULATED.3IONS-SCNC: 7 MMOL/L (ref 5–15)
AST SERPL-CCNC: 14 U/L (ref 1–32)
BASOPHILS # BLD AUTO: 0.03 10*3/MM3 (ref 0–0.2)
BASOPHILS NFR BLD AUTO: 0.8 % (ref 0–1.5)
BILIRUB SERPL-MCNC: 0.3 MG/DL (ref 0–1.2)
BUN SERPL-MCNC: 8 MG/DL (ref 8–23)
BUN/CREAT SERPL: 14.8 (ref 7–25)
CALCIUM SPEC-SCNC: 8.9 MG/DL (ref 8.6–10.5)
CHLORIDE SERPL-SCNC: 107 MMOL/L (ref 98–107)
CO2 SERPL-SCNC: 26 MMOL/L (ref 22–29)
CREAT SERPL-MCNC: 0.54 MG/DL (ref 0.57–1)
DEPRECATED RDW RBC AUTO: 51.3 FL (ref 37–54)
EGFRCR SERPLBLD CKD-EPI 2021: 103 ML/MIN/1.73
EOSINOPHIL # BLD AUTO: 0.06 10*3/MM3 (ref 0–0.4)
EOSINOPHIL NFR BLD AUTO: 1.5 % (ref 0.3–6.2)
ERYTHROCYTE [DISTWIDTH] IN BLOOD BY AUTOMATED COUNT: 14.5 % (ref 12.3–15.4)
GLOBULIN UR ELPH-MCNC: 2.3 GM/DL
GLUCOSE SERPL-MCNC: 92 MG/DL (ref 65–99)
HCT VFR BLD AUTO: 32.8 % (ref 34–46.6)
HGB BLD-MCNC: 10.5 G/DL (ref 12–15.9)
HOLD SPECIMEN: NORMAL
IMM GRANULOCYTES # BLD AUTO: 0.05 10*3/MM3 (ref 0–0.05)
IMM GRANULOCYTES NFR BLD AUTO: 1.3 % (ref 0–0.5)
LYMPHOCYTES # BLD AUTO: 0.59 10*3/MM3 (ref 0.7–3.1)
LYMPHOCYTES NFR BLD AUTO: 14.9 % (ref 19.6–45.3)
MCH RBC QN AUTO: 31.1 PG (ref 26.6–33)
MCHC RBC AUTO-ENTMCNC: 32 G/DL (ref 31.5–35.7)
MCV RBC AUTO: 97 FL (ref 79–97)
MONOCYTES # BLD AUTO: 0.44 10*3/MM3 (ref 0.1–0.9)
MONOCYTES NFR BLD AUTO: 11.1 % (ref 5–12)
NEUTROPHILS NFR BLD AUTO: 2.79 10*3/MM3 (ref 1.7–7)
NEUTROPHILS NFR BLD AUTO: 70.4 % (ref 42.7–76)
NRBC BLD AUTO-RTO: 0 /100 WBC (ref 0–0.2)
PLATELET # BLD AUTO: 155 10*3/MM3 (ref 140–450)
PMV BLD AUTO: 9.7 FL (ref 6–12)
POTASSIUM SERPL-SCNC: 4.1 MMOL/L (ref 3.5–5.2)
PROT SERPL-MCNC: 6 G/DL (ref 6–8.5)
RBC # BLD AUTO: 3.38 10*6/MM3 (ref 3.77–5.28)
SODIUM SERPL-SCNC: 140 MMOL/L (ref 136–145)
WBC NRBC COR # BLD: 3.96 10*3/MM3 (ref 3.4–10.8)

## 2023-03-10 PROCEDURE — 80053 COMPREHEN METABOLIC PANEL: CPT

## 2023-03-10 PROCEDURE — 96367 TX/PROPH/DG ADDL SEQ IV INF: CPT | Performed by: INTERNAL MEDICINE

## 2023-03-10 PROCEDURE — 96375 TX/PRO/DX INJ NEW DRUG ADDON: CPT | Performed by: INTERNAL MEDICINE

## 2023-03-10 PROCEDURE — 25010000002 SACITUZUMAB GOVITECAN-HZIY 180 MG RECONSTITUTED SOLUTION 1 EACH VIAL: Performed by: INTERNAL MEDICINE

## 2023-03-10 PROCEDURE — 96413 CHEMO IV INFUSION 1 HR: CPT | Performed by: INTERNAL MEDICINE

## 2023-03-10 PROCEDURE — 25010000002 PALONOSETRON PER 25 MCG: Performed by: INTERNAL MEDICINE

## 2023-03-10 PROCEDURE — 25010000002 HEPARIN LOCK FLUSH PER 10 UNITS: Performed by: INTERNAL MEDICINE

## 2023-03-10 PROCEDURE — 36415 COLL VENOUS BLD VENIPUNCTURE: CPT

## 2023-03-10 PROCEDURE — 25010000002 FOSAPREPITANT PER 1 MG: Performed by: INTERNAL MEDICINE

## 2023-03-10 PROCEDURE — 25010000002 DEXAMETHASONE SODIUM PHOSPHATE 100 MG/10ML SOLUTION: Performed by: INTERNAL MEDICINE

## 2023-03-10 PROCEDURE — 25010000002 DIPHENHYDRAMINE PER 50 MG: Performed by: INTERNAL MEDICINE

## 2023-03-10 PROCEDURE — 85025 COMPLETE CBC W/AUTO DIFF WBC: CPT

## 2023-03-10 RX ORDER — HEPARIN SODIUM (PORCINE) LOCK FLUSH IV SOLN 100 UNIT/ML 100 UNIT/ML
500 SOLUTION INTRAVENOUS AS NEEDED
Status: DISCONTINUED | OUTPATIENT
Start: 2023-03-10 | End: 2023-03-10 | Stop reason: HOSPADM

## 2023-03-10 RX ORDER — FAMOTIDINE 10 MG/ML
20 INJECTION, SOLUTION INTRAVENOUS ONCE
Status: COMPLETED | OUTPATIENT
Start: 2023-03-10 | End: 2023-03-10

## 2023-03-10 RX ORDER — ACETAMINOPHEN 325 MG/1
650 TABLET ORAL ONCE
Status: COMPLETED | OUTPATIENT
Start: 2023-03-10 | End: 2023-03-10

## 2023-03-10 RX ORDER — SODIUM CHLORIDE 0.9 % (FLUSH) 0.9 %
10 SYRINGE (ML) INJECTION AS NEEDED
Status: DISCONTINUED | OUTPATIENT
Start: 2023-03-10 | End: 2023-03-10 | Stop reason: HOSPADM

## 2023-03-10 RX ORDER — PALONOSETRON 0.05 MG/ML
0.25 INJECTION, SOLUTION INTRAVENOUS ONCE
Status: COMPLETED | OUTPATIENT
Start: 2023-03-10 | End: 2023-03-10

## 2023-03-10 RX ORDER — SODIUM CHLORIDE 9 MG/ML
250 INJECTION, SOLUTION INTRAVENOUS ONCE
Status: COMPLETED | OUTPATIENT
Start: 2023-03-10 | End: 2023-03-10

## 2023-03-10 RX ORDER — DIPHENHYDRAMINE HYDROCHLORIDE 50 MG/ML
50 INJECTION INTRAMUSCULAR; INTRAVENOUS AS NEEDED
Status: DISCONTINUED | OUTPATIENT
Start: 2023-03-10 | End: 2023-03-10 | Stop reason: HOSPADM

## 2023-03-10 RX ORDER — HEPARIN SODIUM (PORCINE) LOCK FLUSH IV SOLN 100 UNIT/ML 100 UNIT/ML
500 SOLUTION INTRAVENOUS AS NEEDED
Status: CANCELLED | OUTPATIENT
Start: 2023-03-10

## 2023-03-10 RX ORDER — FAMOTIDINE 10 MG/ML
20 INJECTION, SOLUTION INTRAVENOUS AS NEEDED
Status: DISCONTINUED | OUTPATIENT
Start: 2023-03-10 | End: 2023-03-10 | Stop reason: HOSPADM

## 2023-03-10 RX ORDER — ATROPINE SULFATE 1 MG/ML
0.25 INJECTION, SOLUTION INTRAMUSCULAR; INTRAVENOUS; SUBCUTANEOUS
Status: DISCONTINUED | OUTPATIENT
Start: 2023-03-10 | End: 2023-03-10 | Stop reason: HOSPADM

## 2023-03-10 RX ORDER — SODIUM CHLORIDE 0.9 % (FLUSH) 0.9 %
10 SYRINGE (ML) INJECTION AS NEEDED
Status: CANCELLED | OUTPATIENT
Start: 2023-03-10

## 2023-03-10 RX ORDER — OLANZAPINE 5 MG/1
5 TABLET ORAL ONCE
Status: COMPLETED | OUTPATIENT
Start: 2023-03-10 | End: 2023-03-10

## 2023-03-10 RX ADMIN — PALONOSETRON HYDROCHLORIDE 0.25 MG: 0.25 INJECTION, SOLUTION INTRAVENOUS at 10:21

## 2023-03-10 RX ADMIN — FOSAPREPITANT 100 ML: 150 INJECTION, POWDER, LYOPHILIZED, FOR SOLUTION INTRAVENOUS at 11:04

## 2023-03-10 RX ADMIN — Medication 10 ML: at 14:05

## 2023-03-10 RX ADMIN — OLANZAPINE 5 MG: 5 TABLET, FILM COATED ORAL at 10:21

## 2023-03-10 RX ADMIN — SODIUM CHLORIDE 250 ML: 9 INJECTION, SOLUTION INTRAVENOUS at 10:21

## 2023-03-10 RX ADMIN — FAMOTIDINE 20 MG: 10 INJECTION INTRAVENOUS at 10:26

## 2023-03-10 RX ADMIN — ACETAMINOPHEN 650 MG: 325 TABLET ORAL at 10:21

## 2023-03-10 RX ADMIN — HEPARIN 500 UNITS: 100 SYRINGE at 14:05

## 2023-03-10 RX ADMIN — DIPHENHYDRAMINE HYDROCHLORIDE 25 MG: 50 INJECTION, SOLUTION INTRAMUSCULAR; INTRAVENOUS at 10:46

## 2023-03-10 RX ADMIN — SACITUZUMAB GOVITECAN 460 MG: 180 POWDER, FOR SOLUTION INTRAVENOUS at 12:48

## 2023-03-10 RX ADMIN — DEXAMETHASONE SODIUM PHOSPHATE 12 MG: 10 INJECTION, SOLUTION INTRAMUSCULAR; INTRAVENOUS at 12:16

## 2023-03-24 ENCOUNTER — INFUSION (OUTPATIENT)
Dept: ONCOLOGY | Facility: HOSPITAL | Age: 63
End: 2023-03-24
Payer: OTHER GOVERNMENT

## 2023-03-24 VITALS — DIASTOLIC BLOOD PRESSURE: 54 MMHG | SYSTOLIC BLOOD PRESSURE: 113 MMHG | HEART RATE: 87 BPM | TEMPERATURE: 97.4 F

## 2023-03-24 DIAGNOSIS — C50.919 MALIGNANT NEOPLASM OF FEMALE BREAST, UNSPECIFIED ESTROGEN RECEPTOR STATUS, UNSPECIFIED LATERALITY, UNSPECIFIED SITE OF BREAST: ICD-10-CM

## 2023-03-24 DIAGNOSIS — C50.919 MALIGNANT NEOPLASM OF FEMALE BREAST, UNSPECIFIED ESTROGEN RECEPTOR STATUS, UNSPECIFIED LATERALITY, UNSPECIFIED SITE OF BREAST: Primary | ICD-10-CM

## 2023-03-24 LAB
ALBUMIN SERPL-MCNC: 3.5 G/DL (ref 3.5–5.2)
ALBUMIN/GLOB SERPL: 1.4 G/DL
ALP SERPL-CCNC: 73 U/L (ref 39–117)
ALT SERPL W P-5'-P-CCNC: 7 U/L (ref 1–33)
ANION GAP SERPL CALCULATED.3IONS-SCNC: 10 MMOL/L (ref 5–15)
AST SERPL-CCNC: 14 U/L (ref 1–32)
BILIRUB SERPL-MCNC: 0.2 MG/DL (ref 0–1.2)
BUN SERPL-MCNC: 9 MG/DL (ref 8–23)
BUN/CREAT SERPL: 16.7 (ref 7–25)
CALCIUM SPEC-SCNC: 8.9 MG/DL (ref 8.6–10.5)
CHLORIDE SERPL-SCNC: 107 MMOL/L (ref 98–107)
CO2 SERPL-SCNC: 24 MMOL/L (ref 22–29)
CREAT SERPL-MCNC: 0.54 MG/DL (ref 0.57–1)
DEPRECATED RDW RBC AUTO: 51.8 FL (ref 37–54)
EGFRCR SERPLBLD CKD-EPI 2021: 103 ML/MIN/1.73
EOSINOPHIL # BLD MANUAL: 0.03 10*3/MM3 (ref 0–0.4)
EOSINOPHIL NFR BLD MANUAL: 1 % (ref 0.3–6.2)
ERYTHROCYTE [DISTWIDTH] IN BLOOD BY AUTOMATED COUNT: 14.8 % (ref 12.3–15.4)
GLOBULIN UR ELPH-MCNC: 2.5 GM/DL
GLUCOSE SERPL-MCNC: 99 MG/DL (ref 65–99)
HCT VFR BLD AUTO: 29.8 % (ref 34–46.6)
HGB BLD-MCNC: 9.5 G/DL (ref 12–15.9)
HOLD SPECIMEN: NORMAL
LYMPHOCYTES # BLD MANUAL: 0.92 10*3/MM3 (ref 0.7–3.1)
LYMPHOCYTES NFR BLD MANUAL: 9 % (ref 5–12)
MCH RBC QN AUTO: 30.8 PG (ref 26.6–33)
MCHC RBC AUTO-ENTMCNC: 31.9 G/DL (ref 31.5–35.7)
MCV RBC AUTO: 96.8 FL (ref 79–97)
MONOCYTES # BLD: 0.3 10*3/MM3 (ref 0.1–0.9)
NEUTROPHILS # BLD AUTO: 2.03 10*3/MM3 (ref 1.7–7)
NEUTROPHILS NFR BLD MANUAL: 60 % (ref 42.7–76)
NEUTS BAND NFR BLD MANUAL: 2 % (ref 0–5)
PLATELET # BLD AUTO: 147 10*3/MM3 (ref 140–450)
PMV BLD AUTO: 9.9 FL (ref 6–12)
POTASSIUM SERPL-SCNC: 4.2 MMOL/L (ref 3.5–5.2)
PROT SERPL-MCNC: 6 G/DL (ref 6–8.5)
RBC # BLD AUTO: 3.08 10*6/MM3 (ref 3.77–5.28)
RBC MORPH BLD: NORMAL
SMALL PLATELETS BLD QL SMEAR: ADEQUATE
SODIUM SERPL-SCNC: 141 MMOL/L (ref 136–145)
VARIANT LYMPHS NFR BLD MANUAL: 1 % (ref 0–5)
VARIANT LYMPHS NFR BLD MANUAL: 27 % (ref 19.6–45.3)
WBC MORPH BLD: NORMAL
WBC NRBC COR # BLD: 3.28 10*3/MM3 (ref 3.4–10.8)

## 2023-03-24 PROCEDURE — 25010000002 DIPHENHYDRAMINE PER 50 MG: Performed by: INTERNAL MEDICINE

## 2023-03-24 PROCEDURE — 85025 COMPLETE CBC W/AUTO DIFF WBC: CPT

## 2023-03-24 PROCEDURE — 25010000002 HEPARIN LOCK FLUSH PER 10 UNITS: Performed by: INTERNAL MEDICINE

## 2023-03-24 PROCEDURE — 25010000002 DEXAMETHASONE SODIUM PHOSPHATE 100 MG/10ML SOLUTION: Performed by: INTERNAL MEDICINE

## 2023-03-24 PROCEDURE — 96375 TX/PRO/DX INJ NEW DRUG ADDON: CPT | Performed by: INTERNAL MEDICINE

## 2023-03-24 PROCEDURE — 80053 COMPREHEN METABOLIC PANEL: CPT

## 2023-03-24 PROCEDURE — 96367 TX/PROPH/DG ADDL SEQ IV INF: CPT | Performed by: INTERNAL MEDICINE

## 2023-03-24 PROCEDURE — 25010000002 PALONOSETRON PER 25 MCG: Performed by: INTERNAL MEDICINE

## 2023-03-24 PROCEDURE — 25010000002 SACITUZUMAB GOVITECAN-HZIY 180 MG RECONSTITUTED SOLUTION 1 EACH VIAL: Performed by: INTERNAL MEDICINE

## 2023-03-24 PROCEDURE — 36415 COLL VENOUS BLD VENIPUNCTURE: CPT

## 2023-03-24 PROCEDURE — 85007 BL SMEAR W/DIFF WBC COUNT: CPT

## 2023-03-24 PROCEDURE — 96413 CHEMO IV INFUSION 1 HR: CPT | Performed by: INTERNAL MEDICINE

## 2023-03-24 PROCEDURE — 25010000002 FOSAPREPITANT PER 1 MG: Performed by: INTERNAL MEDICINE

## 2023-03-24 RX ORDER — SODIUM CHLORIDE 0.9 % (FLUSH) 0.9 %
10 SYRINGE (ML) INJECTION AS NEEDED
Status: DISCONTINUED | OUTPATIENT
Start: 2023-03-24 | End: 2023-03-24 | Stop reason: HOSPADM

## 2023-03-24 RX ORDER — ATROPINE SULFATE 1 MG/ML
0.25 INJECTION, SOLUTION INTRAMUSCULAR; INTRAVENOUS; SUBCUTANEOUS
Status: CANCELLED | OUTPATIENT
Start: 2023-03-24

## 2023-03-24 RX ORDER — SODIUM CHLORIDE 9 MG/ML
250 INJECTION, SOLUTION INTRAVENOUS ONCE
Status: CANCELLED | OUTPATIENT
Start: 2023-03-24

## 2023-03-24 RX ORDER — PALONOSETRON 0.05 MG/ML
0.25 INJECTION, SOLUTION INTRAVENOUS ONCE
Status: CANCELLED | OUTPATIENT
Start: 2023-03-24

## 2023-03-24 RX ORDER — PALONOSETRON 0.05 MG/ML
0.25 INJECTION, SOLUTION INTRAVENOUS ONCE
Status: COMPLETED | OUTPATIENT
Start: 2023-03-24 | End: 2023-03-24

## 2023-03-24 RX ORDER — HEPARIN SODIUM (PORCINE) LOCK FLUSH IV SOLN 100 UNIT/ML 100 UNIT/ML
500 SOLUTION INTRAVENOUS AS NEEDED
Status: DISCONTINUED | OUTPATIENT
Start: 2023-03-24 | End: 2023-03-24 | Stop reason: HOSPADM

## 2023-03-24 RX ORDER — FAMOTIDINE 10 MG/ML
20 INJECTION, SOLUTION INTRAVENOUS AS NEEDED
Status: CANCELLED | OUTPATIENT
Start: 2023-03-24

## 2023-03-24 RX ORDER — OLANZAPINE 5 MG/1
5 TABLET ORAL ONCE
Status: COMPLETED | OUTPATIENT
Start: 2023-03-24 | End: 2023-03-24

## 2023-03-24 RX ORDER — SODIUM CHLORIDE 9 MG/ML
250 INJECTION, SOLUTION INTRAVENOUS ONCE
Status: COMPLETED | OUTPATIENT
Start: 2023-03-24 | End: 2023-03-24

## 2023-03-24 RX ORDER — DIPHENHYDRAMINE HYDROCHLORIDE 50 MG/ML
50 INJECTION INTRAMUSCULAR; INTRAVENOUS AS NEEDED
Status: CANCELLED | OUTPATIENT
Start: 2023-03-24

## 2023-03-24 RX ORDER — SODIUM CHLORIDE 0.9 % (FLUSH) 0.9 %
10 SYRINGE (ML) INJECTION AS NEEDED
Status: CANCELLED | OUTPATIENT
Start: 2023-03-24

## 2023-03-24 RX ORDER — DIPHENHYDRAMINE HYDROCHLORIDE 50 MG/ML
50 INJECTION INTRAMUSCULAR; INTRAVENOUS AS NEEDED
Status: DISCONTINUED | OUTPATIENT
Start: 2023-03-24 | End: 2023-03-24 | Stop reason: HOSPADM

## 2023-03-24 RX ORDER — ACETAMINOPHEN 325 MG/1
650 TABLET ORAL ONCE
Status: COMPLETED | OUTPATIENT
Start: 2023-03-24 | End: 2023-03-24

## 2023-03-24 RX ORDER — ACETAMINOPHEN 325 MG/1
650 TABLET ORAL ONCE
Status: CANCELLED | OUTPATIENT
Start: 2023-03-24

## 2023-03-24 RX ORDER — FAMOTIDINE 10 MG/ML
20 INJECTION, SOLUTION INTRAVENOUS AS NEEDED
Status: DISCONTINUED | OUTPATIENT
Start: 2023-03-24 | End: 2023-03-24 | Stop reason: HOSPADM

## 2023-03-24 RX ORDER — FAMOTIDINE 10 MG/ML
20 INJECTION, SOLUTION INTRAVENOUS ONCE
Status: CANCELLED | OUTPATIENT
Start: 2023-03-24

## 2023-03-24 RX ORDER — FAMOTIDINE 10 MG/ML
20 INJECTION, SOLUTION INTRAVENOUS ONCE
Status: COMPLETED | OUTPATIENT
Start: 2023-03-24 | End: 2023-03-24

## 2023-03-24 RX ORDER — HEPARIN SODIUM (PORCINE) LOCK FLUSH IV SOLN 100 UNIT/ML 100 UNIT/ML
500 SOLUTION INTRAVENOUS AS NEEDED
Status: CANCELLED | OUTPATIENT
Start: 2023-03-24

## 2023-03-24 RX ORDER — ATROPINE SULFATE 1 MG/ML
0.25 INJECTION, SOLUTION INTRAMUSCULAR; INTRAVENOUS; SUBCUTANEOUS
Status: DISCONTINUED | OUTPATIENT
Start: 2023-03-24 | End: 2023-03-24 | Stop reason: HOSPADM

## 2023-03-24 RX ORDER — OLANZAPINE 5 MG/1
5 TABLET ORAL ONCE
Status: CANCELLED | OUTPATIENT
Start: 2023-03-24 | End: 2023-03-24

## 2023-03-24 RX ADMIN — DEXAMETHASONE SODIUM PHOSPHATE 12 MG: 10 INJECTION, SOLUTION INTRAMUSCULAR; INTRAVENOUS at 10:59

## 2023-03-24 RX ADMIN — HEPARIN 500 UNITS: 100 SYRINGE at 13:20

## 2023-03-24 RX ADMIN — FOSAPREPITANT 100 ML: 150 INJECTION, POWDER, LYOPHILIZED, FOR SOLUTION INTRAVENOUS at 09:35

## 2023-03-24 RX ADMIN — SODIUM CHLORIDE 250 ML: 9 INJECTION, SOLUTION INTRAVENOUS at 09:16

## 2023-03-24 RX ADMIN — FAMOTIDINE 20 MG: 10 INJECTION INTRAVENOUS at 09:21

## 2023-03-24 RX ADMIN — SACITUZUMAB GOVITECAN 460 MG: 180 POWDER, FOR SOLUTION INTRAVENOUS at 11:31

## 2023-03-24 RX ADMIN — PALONOSETRON HYDROCHLORIDE 0.25 MG: 0.25 INJECTION, SOLUTION INTRAVENOUS at 09:17

## 2023-03-24 RX ADMIN — DIPHENHYDRAMINE HYDROCHLORIDE 25 MG: 50 INJECTION, SOLUTION INTRAMUSCULAR; INTRAVENOUS at 10:20

## 2023-03-24 RX ADMIN — ACETAMINOPHEN 650 MG: 325 TABLET ORAL at 10:20

## 2023-03-24 RX ADMIN — OLANZAPINE 5 MG: 5 TABLET, FILM COATED ORAL at 09:20

## 2023-03-27 ENCOUNTER — HOSPITAL ENCOUNTER (OUTPATIENT)
Dept: PET IMAGING | Facility: HOSPITAL | Age: 63
Discharge: HOME | End: 2023-03-27
Admitting: INTERNAL MEDICINE
Payer: OTHER GOVERNMENT

## 2023-03-27 DIAGNOSIS — C50.919 MALIGNANT NEOPLASM OF FEMALE BREAST, UNSPECIFIED ESTROGEN RECEPTOR STATUS, UNSPECIFIED LATERALITY, UNSPECIFIED SITE OF BREAST: ICD-10-CM

## 2023-03-27 PROCEDURE — 78815 PET IMAGE W/CT SKULL-THIGH: CPT

## 2023-03-27 PROCEDURE — A9552 F18 FDG: HCPCS | Performed by: INTERNAL MEDICINE

## 2023-03-27 PROCEDURE — 0 FLUDEOXYGLUCOSE F18 SOLUTION: Performed by: INTERNAL MEDICINE

## 2023-03-27 RX ADMIN — SODIUM FLUORIDE F 18 1 DOSE: 200 INJECTION, SOLUTION INTRAVENOUS at 07:39

## 2023-03-27 NOTE — PROGRESS NOTES
Subjective     Radha Duarte was seen in follow for metastatic breast cancer.     She is overall stable.  No new health issues.  Overall pain has been stable.  Right upper extremity swelling as well as right breast swelling is overall stable.  Result of PET scan reviewed.  Tolerating chemotherapy well except for fatigue.    Past Medical History, Past Surgical History, Social History, Family History have been reviewed and are without significant changes except as mentioned.        Medications:  The current medication list was reviewed in the EMR    ALLERGIES:    Allergies   Allergen Reactions   • Percocet [Oxycodone-Acetaminophen] Nausea And Vomiting       Objective      Vitals:    03/31/23 0826   BP: 107/52   Pulse: 83   Resp: 18   Temp: 98.2 °F (36.8 °C)   SpO2: 90%           Current Status 3/24/2023   ECOG score 0       Physical Exam  Vitals and nursing note reviewed. Exam conducted with a chaperone present.   Abdominal:      General: There is no distension.      Palpations: Abdomen is soft.      Tenderness: There is no abdominal tenderness.   Musculoskeletal:      Comments: RUE swelling + right breast swelling +    Neurological:      General: No focal deficit present.      Mental Status: She is oriented to person, place, and time. Mental status is at baseline.   Psychiatric:         Mood and Affect: Mood normal.         Behavior: Behavior normal.         Thought Content: Thought content normal.               RECENT LABS:Independently reviewed and summarized  Hematology WBC   Date Value Ref Range Status   03/24/2023 3.28 (L) 3.40 - 10.80 10*3/mm3 Final     RBC   Date Value Ref Range Status   03/24/2023 3.08 (L) 3.77 - 5.28 10*6/mm3 Final     Hemoglobin   Date Value Ref Range Status   03/24/2023 9.5 (L) 12.0 - 15.9 g/dL Final     Hematocrit   Date Value Ref Range Status   03/24/2023 29.8 (L) 34.0 - 46.6 % Final     Platelets   Date Value Ref Range Status   03/24/2023 147 140 - 450 10*3/mm3 Final     Lab  Results   Component Value Date    GLUCOSE 91 03/31/2023    BUN 10 03/31/2023    CREATININE 0.53 (L) 03/31/2023    EGFR 103.4 03/31/2023    BCR 18.9 03/31/2023    K 4.1 03/31/2023    CO2 26.0 03/31/2023    CALCIUM 9.0 03/31/2023    ALBUMIN 3.7 03/31/2023    BILITOT 0.3 03/31/2023    AST 14 03/31/2023    ALT 10 03/31/2023              Diagnosis:    (1) Metastatic breast cancer with distant LN (mediastinal) metastases   Triple negative   Unable to do perform foundation one due to limited tissue      Current therapy:   Weekly carboplatin/paclitaxel     (4/22/22- 7/7/22)      Discontinue to pain peripheral neuropathy in lower extremities.      Switch to XELODA (7//22/22)      PET scan on 9/19/22 showed progressive disease, mainly in right breast/chest wall/regional nodes.      Started on trodelvy.   D1C1: 10/4/22      Palliative RT to right breast/LN.      D1C2: 11/4/22  D8C2: 11/11/22   D1C3: 12/9/22  D8C3: 12/16/22      PET scan on 12/19/22 showed favorable response to treatment. BETO.      D1C4: 12/30/22   D8C4: 1/6/23     D1C5: 1/20/23   D8C5: 1/27/23      D1C6: 2/10/23   D8C6: 2/17/23      D1C7: 3/3/23  D8C7: 3/10/23    D1C8: 3/24/23    PET scan on 3/27/23 showed No evidence of progression.     D8C8: 3/31/23     (2) Cancer associated pain   (3) Right upper extremity DVT   (4) Mucositis   (5) Unintentional weight loss   (6) Chemotherapy induce peripheral neuropathy     Assessment & Plan     (1) Metastatic breast cancer with distant LN (mediastinal) metastases     Chronic, stable.   She is currently on Trodelvy.  Tolerating treatment well without any major side effects.  Labs look stable.  Result of PET scan reviewed which showed stable disease without any evidence of progression.  Day 8 cycle 8 Trodelvy was signed on today's visit.  Continue chemotherapy with close monitoring.    (2) Cancer associated pain    Chronic, stable.   She is on MS Cottin 30 mg twice a day.  She will continue this.   New prescription sent.       (3) Right upper extremity DVT     Chronic, stable.   She is on eliquis.   No new concern for bleeding or thrombosis.   Continue close monitoring on chemotherapy.     (4) Mucositis     Chronic, stable.   Magic mouthwash was needed.     (5) Unintentional weight loss     Chronic, stable.   Recommend high calori high protein diet.     (6) Chemotherapy induce peripheral neuropathy     Chronic, stable.   She is on neurontin 400 mg TID.   She will continue this.     3/26/2023      CC:

## 2023-03-29 DIAGNOSIS — G89.3 CANCER ASSOCIATED PAIN: ICD-10-CM

## 2023-03-29 DIAGNOSIS — C50.919 MALIGNANT NEOPLASM OF FEMALE BREAST, UNSPECIFIED ESTROGEN RECEPTOR STATUS, UNSPECIFIED LATERALITY, UNSPECIFIED SITE OF BREAST: ICD-10-CM

## 2023-03-29 RX ORDER — MORPHINE SULFATE 15 MG/1
30 TABLET, FILM COATED, EXTENDED RELEASE ORAL 2 TIMES DAILY
Qty: 90 TABLET | Refills: 0 | Status: CANCELLED | OUTPATIENT
Start: 2023-03-29

## 2023-03-29 RX ORDER — OLANZAPINE 5 MG/1
TABLET ORAL
Qty: 50 TABLET | Refills: 3 | Status: SHIPPED | OUTPATIENT
Start: 2023-03-29

## 2023-03-29 RX ORDER — MORPHINE SULFATE 15 MG/1
30 TABLET, FILM COATED, EXTENDED RELEASE ORAL 2 TIMES DAILY
Qty: 90 TABLET | Refills: 0 | Status: SHIPPED | OUTPATIENT
Start: 2023-03-29 | End: 2023-04-05 | Stop reason: SDUPTHER

## 2023-03-29 RX ORDER — PROMETHAZINE HYDROCHLORIDE 12.5 MG/1
TABLET ORAL
Qty: 360 TABLET | Refills: 3 | Status: SHIPPED | OUTPATIENT
Start: 2023-03-29

## 2023-03-29 NOTE — TELEPHONE ENCOUNTER
Rx Refill Note  Requested Prescriptions     Pending Prescriptions Disp Refills   • Morphine (MS CONTIN) 15 MG 12 hr tablet 90 tablet 0     Sig: Take 2 tablets by mouth 2 (Two) Times a Day.      Last office visit with prescribing clinician: 3/3/2023   Last telemedicine visit with prescribing clinician: 3/31/2023   Next office visit with prescribing clinician: 3/31/2023                         Would you like a call back once the refill request has been completed: [] Yes [] No    If the office needs to give you a call back, can they leave a voicemail: [] Yes [] No    Migdalia Arnold  03/29/23, 10:03 CDT

## 2023-03-29 NOTE — TELEPHONE ENCOUNTER
Rx Refill Note  Requested Prescriptions     Pending Prescriptions Disp Refills   • OLANZapine (zyPREXA) 5 MG tablet [Pharmacy Med Name: OLANZAPINE TABS 5MG] 50 tablet 3     Sig: TAKE 1 TABLET EVERY NIGHT. TAKE AFTER CHEMOTHERAPY ON DAYS 2, 3, 4, AND DAYS 9, 10, 11.   • promethazine (PHENERGAN) 12.5 MG tablet [Pharmacy Med Name: PROMETHAZINE HCL TABS 12.5MG] 360 tablet 3     Sig: TAKE 1 TABLET EVERY 6 HOURS AS NEEDED FOR NAUSEA OR VOMITING      Last office visit with prescribing clinician: 3/3/2023   Last telemedicine visit with prescribing clinician: 3/31/2023   Next office visit with prescribing clinician: 3/31/2023                         Would you like a call back once the refill request has been completed: [] Yes [] No    If the office needs to give you a call back, can they leave a voicemail: [] Yes [] No    Migdalia Arnold  03/29/23, 09:01 CDT

## 2023-03-29 NOTE — TELEPHONE ENCOUNTER
Incoming Refill Request      Medication requested (name and dose):morphine  Pharmacy where request should be sent: [facility]     Additional details provided by patient: 7 days left    Best call back number: [phone]    Does the patient have less than a 3 day supply:  [] Yes  [x] No    Chaparro Rendon Rep  03/29/23, 15:04 CDT

## 2023-03-29 NOTE — TELEPHONE ENCOUNTER
Rx Refill Note  Requested Prescriptions     Pending Prescriptions Disp Refills   • Morphine (MS CONTIN) 15 MG 12 hr tablet 90 tablet 0     Sig: Take 2 tablets by mouth 2 (Two) Times a Day.      Last office visit with prescribing clinician: 3/3/2023   Last telemedicine visit with prescribing clinician: 3/31/2023   Next office visit with prescribing clinician: 3/31/2023                         Would you like a call back once the refill request has been completed: [] Yes [] No    If the office needs to give you a call back, can they leave a voicemail: [] Yes [] No    Migdalia Arnold  03/29/23, 15:13 CDT

## 2023-03-31 ENCOUNTER — OFFICE VISIT (OUTPATIENT)
Dept: ONCOLOGY | Facility: CLINIC | Age: 63
End: 2023-03-31
Payer: OTHER GOVERNMENT

## 2023-03-31 ENCOUNTER — INFUSION (OUTPATIENT)
Dept: ONCOLOGY | Facility: HOSPITAL | Age: 63
End: 2023-03-31
Payer: OTHER GOVERNMENT

## 2023-03-31 VITALS
BODY MASS INDEX: 22.5 KG/M2 | OXYGEN SATURATION: 90 % | DIASTOLIC BLOOD PRESSURE: 52 MMHG | WEIGHT: 127 LBS | RESPIRATION RATE: 18 BRPM | TEMPERATURE: 98.2 F | HEART RATE: 83 BPM | SYSTOLIC BLOOD PRESSURE: 107 MMHG

## 2023-03-31 DIAGNOSIS — C50.919 MALIGNANT NEOPLASM OF FEMALE BREAST, UNSPECIFIED ESTROGEN RECEPTOR STATUS, UNSPECIFIED LATERALITY, UNSPECIFIED SITE OF BREAST: Primary | ICD-10-CM

## 2023-03-31 DIAGNOSIS — C50.919 MALIGNANT NEOPLASM OF FEMALE BREAST, UNSPECIFIED ESTROGEN RECEPTOR STATUS, UNSPECIFIED LATERALITY, UNSPECIFIED SITE OF BREAST: ICD-10-CM

## 2023-03-31 DIAGNOSIS — G89.3 CANCER ASSOCIATED PAIN: ICD-10-CM

## 2023-03-31 DIAGNOSIS — T45.1X5A CHEMOTHERAPY-INDUCED PERIPHERAL NEUROPATHY: ICD-10-CM

## 2023-03-31 DIAGNOSIS — I82.A12 ACUTE DEEP VEIN THROMBOSIS (DVT) OF AXILLARY VEIN OF LEFT UPPER EXTREMITY: ICD-10-CM

## 2023-03-31 DIAGNOSIS — G62.0 CHEMOTHERAPY-INDUCED PERIPHERAL NEUROPATHY: ICD-10-CM

## 2023-03-31 LAB
ALBUMIN SERPL-MCNC: 3.7 G/DL (ref 3.5–5.2)
ALBUMIN/GLOB SERPL: 1.7 G/DL
ALP SERPL-CCNC: 79 U/L (ref 39–117)
ALT SERPL W P-5'-P-CCNC: 10 U/L (ref 1–33)
ANION GAP SERPL CALCULATED.3IONS-SCNC: 8 MMOL/L (ref 5–15)
AST SERPL-CCNC: 14 U/L (ref 1–32)
BASOPHILS # BLD AUTO: 0.02 10*3/MM3 (ref 0–0.2)
BASOPHILS NFR BLD AUTO: 0.5 % (ref 0–1.5)
BILIRUB SERPL-MCNC: 0.3 MG/DL (ref 0–1.2)
BUN SERPL-MCNC: 10 MG/DL (ref 8–23)
BUN/CREAT SERPL: 18.9 (ref 7–25)
CALCIUM SPEC-SCNC: 9 MG/DL (ref 8.6–10.5)
CHLORIDE SERPL-SCNC: 107 MMOL/L (ref 98–107)
CO2 SERPL-SCNC: 26 MMOL/L (ref 22–29)
CREAT SERPL-MCNC: 0.53 MG/DL (ref 0.57–1)
DEPRECATED RDW RBC AUTO: 53.5 FL (ref 37–54)
EGFRCR SERPLBLD CKD-EPI 2021: 103.4 ML/MIN/1.73
EOSINOPHIL # BLD AUTO: 0.13 10*3/MM3 (ref 0–0.4)
EOSINOPHIL NFR BLD AUTO: 3.1 % (ref 0.3–6.2)
ERYTHROCYTE [DISTWIDTH] IN BLOOD BY AUTOMATED COUNT: 15 % (ref 12.3–15.4)
GLOBULIN UR ELPH-MCNC: 2.2 GM/DL
GLUCOSE SERPL-MCNC: 91 MG/DL (ref 65–99)
HCT VFR BLD AUTO: 30.1 % (ref 34–46.6)
HGB BLD-MCNC: 9.5 G/DL (ref 12–15.9)
HOLD SPECIMEN: NORMAL
IMM GRANULOCYTES # BLD AUTO: 0.06 10*3/MM3 (ref 0–0.05)
IMM GRANULOCYTES NFR BLD AUTO: 1.5 % (ref 0–0.5)
LYMPHOCYTES # BLD AUTO: 0.55 10*3/MM3 (ref 0.7–3.1)
LYMPHOCYTES NFR BLD AUTO: 13.3 % (ref 19.6–45.3)
MCH RBC QN AUTO: 31 PG (ref 26.6–33)
MCHC RBC AUTO-ENTMCNC: 31.6 G/DL (ref 31.5–35.7)
MCV RBC AUTO: 98.4 FL (ref 79–97)
MONOCYTES # BLD AUTO: 0.47 10*3/MM3 (ref 0.1–0.9)
MONOCYTES NFR BLD AUTO: 11.4 % (ref 5–12)
NEUTROPHILS NFR BLD AUTO: 2.9 10*3/MM3 (ref 1.7–7)
NEUTROPHILS NFR BLD AUTO: 70.2 % (ref 42.7–76)
NRBC BLD AUTO-RTO: 0 /100 WBC (ref 0–0.2)
PLATELET # BLD AUTO: 148 10*3/MM3 (ref 140–450)
PMV BLD AUTO: 9.6 FL (ref 6–12)
POTASSIUM SERPL-SCNC: 4.1 MMOL/L (ref 3.5–5.2)
PROT SERPL-MCNC: 5.9 G/DL (ref 6–8.5)
RBC # BLD AUTO: 3.06 10*6/MM3 (ref 3.77–5.28)
SODIUM SERPL-SCNC: 141 MMOL/L (ref 136–145)
WBC NRBC COR # BLD: 4.13 10*3/MM3 (ref 3.4–10.8)

## 2023-03-31 PROCEDURE — 25010000002 PALONOSETRON PER 25 MCG: Performed by: INTERNAL MEDICINE

## 2023-03-31 PROCEDURE — 96367 TX/PROPH/DG ADDL SEQ IV INF: CPT | Performed by: INTERNAL MEDICINE

## 2023-03-31 PROCEDURE — 25010000002 DIPHENHYDRAMINE PER 50 MG: Performed by: INTERNAL MEDICINE

## 2023-03-31 PROCEDURE — 80053 COMPREHEN METABOLIC PANEL: CPT

## 2023-03-31 PROCEDURE — 25010000002 FOSAPREPITANT PER 1 MG: Performed by: INTERNAL MEDICINE

## 2023-03-31 PROCEDURE — 36415 COLL VENOUS BLD VENIPUNCTURE: CPT

## 2023-03-31 PROCEDURE — 96375 TX/PRO/DX INJ NEW DRUG ADDON: CPT | Performed by: INTERNAL MEDICINE

## 2023-03-31 PROCEDURE — 25010000002 HEPARIN LOCK FLUSH PER 10 UNITS: Performed by: INTERNAL MEDICINE

## 2023-03-31 PROCEDURE — 96413 CHEMO IV INFUSION 1 HR: CPT | Performed by: INTERNAL MEDICINE

## 2023-03-31 PROCEDURE — 25010000002 DEXAMETHASONE SODIUM PHOSPHATE 100 MG/10ML SOLUTION: Performed by: INTERNAL MEDICINE

## 2023-03-31 PROCEDURE — 99214 OFFICE O/P EST MOD 30 MIN: CPT | Performed by: INTERNAL MEDICINE

## 2023-03-31 PROCEDURE — 25010000002 SACITUZUMAB GOVITECAN-HZIY 180 MG RECONSTITUTED SOLUTION 1 EACH VIAL: Performed by: INTERNAL MEDICINE

## 2023-03-31 PROCEDURE — 85025 COMPLETE CBC W/AUTO DIFF WBC: CPT

## 2023-03-31 RX ORDER — SODIUM CHLORIDE 9 MG/ML
250 INJECTION, SOLUTION INTRAVENOUS ONCE
Status: COMPLETED | OUTPATIENT
Start: 2023-03-31 | End: 2023-03-31

## 2023-03-31 RX ORDER — HEPARIN SODIUM (PORCINE) LOCK FLUSH IV SOLN 100 UNIT/ML 100 UNIT/ML
500 SOLUTION INTRAVENOUS AS NEEDED
Status: DISCONTINUED | OUTPATIENT
Start: 2023-03-31 | End: 2023-03-31 | Stop reason: HOSPADM

## 2023-03-31 RX ORDER — FAMOTIDINE 10 MG/ML
20 INJECTION, SOLUTION INTRAVENOUS AS NEEDED
Status: CANCELLED | OUTPATIENT
Start: 2023-03-31

## 2023-03-31 RX ORDER — ATROPINE SULFATE 1 MG/ML
0.25 INJECTION, SOLUTION INTRAMUSCULAR; INTRAVENOUS; SUBCUTANEOUS
Status: CANCELLED | OUTPATIENT
Start: 2023-03-31

## 2023-03-31 RX ORDER — DIPHENHYDRAMINE HYDROCHLORIDE 50 MG/ML
50 INJECTION INTRAMUSCULAR; INTRAVENOUS AS NEEDED
Status: CANCELLED | OUTPATIENT
Start: 2023-03-31

## 2023-03-31 RX ORDER — FAMOTIDINE 10 MG/ML
20 INJECTION, SOLUTION INTRAVENOUS ONCE
Status: CANCELLED | OUTPATIENT
Start: 2023-03-31

## 2023-03-31 RX ORDER — FAMOTIDINE 10 MG/ML
20 INJECTION, SOLUTION INTRAVENOUS ONCE
Status: COMPLETED | OUTPATIENT
Start: 2023-03-31 | End: 2023-03-31

## 2023-03-31 RX ORDER — PALONOSETRON 0.05 MG/ML
0.25 INJECTION, SOLUTION INTRAVENOUS ONCE
Status: COMPLETED | OUTPATIENT
Start: 2023-03-31 | End: 2023-03-31

## 2023-03-31 RX ORDER — SODIUM CHLORIDE 9 MG/ML
250 INJECTION, SOLUTION INTRAVENOUS ONCE
Status: CANCELLED | OUTPATIENT
Start: 2023-03-31

## 2023-03-31 RX ORDER — ACETAMINOPHEN 325 MG/1
650 TABLET ORAL ONCE
Status: CANCELLED | OUTPATIENT
Start: 2023-03-31

## 2023-03-31 RX ORDER — SODIUM CHLORIDE 0.9 % (FLUSH) 0.9 %
10 SYRINGE (ML) INJECTION AS NEEDED
OUTPATIENT
Start: 2023-03-31

## 2023-03-31 RX ORDER — SODIUM CHLORIDE 0.9 % (FLUSH) 0.9 %
10 SYRINGE (ML) INJECTION AS NEEDED
Status: DISCONTINUED | OUTPATIENT
Start: 2023-03-31 | End: 2023-03-31 | Stop reason: HOSPADM

## 2023-03-31 RX ORDER — HEPARIN SODIUM (PORCINE) LOCK FLUSH IV SOLN 100 UNIT/ML 100 UNIT/ML
500 SOLUTION INTRAVENOUS AS NEEDED
OUTPATIENT
Start: 2023-03-31

## 2023-03-31 RX ORDER — OLANZAPINE 5 MG/1
5 TABLET ORAL ONCE
Status: CANCELLED | OUTPATIENT
Start: 2023-03-31 | End: 2023-03-31

## 2023-03-31 RX ORDER — OLANZAPINE 5 MG/1
5 TABLET ORAL ONCE
Status: COMPLETED | OUTPATIENT
Start: 2023-03-31 | End: 2023-03-31

## 2023-03-31 RX ORDER — ACETAMINOPHEN 325 MG/1
650 TABLET ORAL ONCE
Status: COMPLETED | OUTPATIENT
Start: 2023-03-31 | End: 2023-03-31

## 2023-03-31 RX ORDER — ATROPINE SULFATE 1 MG/ML
0.25 INJECTION, SOLUTION INTRAMUSCULAR; INTRAVENOUS; SUBCUTANEOUS
Status: DISCONTINUED | OUTPATIENT
Start: 2023-03-31 | End: 2023-03-31 | Stop reason: HOSPADM

## 2023-03-31 RX ORDER — FAMOTIDINE 10 MG/ML
20 INJECTION, SOLUTION INTRAVENOUS AS NEEDED
Status: DISCONTINUED | OUTPATIENT
Start: 2023-03-31 | End: 2023-03-31 | Stop reason: HOSPADM

## 2023-03-31 RX ORDER — PALONOSETRON 0.05 MG/ML
0.25 INJECTION, SOLUTION INTRAVENOUS ONCE
Status: CANCELLED | OUTPATIENT
Start: 2023-03-31

## 2023-03-31 RX ORDER — DIPHENHYDRAMINE HYDROCHLORIDE 50 MG/ML
50 INJECTION INTRAMUSCULAR; INTRAVENOUS AS NEEDED
Status: DISCONTINUED | OUTPATIENT
Start: 2023-03-31 | End: 2023-03-31 | Stop reason: HOSPADM

## 2023-03-31 RX ADMIN — ACETAMINOPHEN 650 MG: 325 TABLET ORAL at 10:12

## 2023-03-31 RX ADMIN — FAMOTIDINE 20 MG: 10 INJECTION INTRAVENOUS at 10:12

## 2023-03-31 RX ADMIN — OLANZAPINE 5 MG: 5 TABLET, FILM COATED ORAL at 09:14

## 2023-03-31 RX ADMIN — HEPARIN 500 UNITS: 100 SYRINGE at 12:32

## 2023-03-31 RX ADMIN — SACITUZUMAB GOVITECAN 460 MG: 180 POWDER, FOR SOLUTION INTRAVENOUS at 11:19

## 2023-03-31 RX ADMIN — DEXAMETHASONE SODIUM PHOSPHATE 12 MG: 10 INJECTION, SOLUTION INTRAMUSCULAR; INTRAVENOUS at 10:49

## 2023-03-31 RX ADMIN — FOSAPREPITANT 100 ML: 150 INJECTION, POWDER, LYOPHILIZED, FOR SOLUTION INTRAVENOUS at 09:22

## 2023-03-31 RX ADMIN — Medication 10 ML: at 12:31

## 2023-03-31 RX ADMIN — DIPHENHYDRAMINE HYDROCHLORIDE 25 MG: 50 INJECTION, SOLUTION INTRAMUSCULAR; INTRAVENOUS at 10:22

## 2023-03-31 RX ADMIN — PALONOSETRON 0.25 MG: 0.05 INJECTION, SOLUTION INTRAVENOUS at 09:14

## 2023-03-31 RX ADMIN — SODIUM CHLORIDE 250 ML: 9 INJECTION, SOLUTION INTRAVENOUS at 09:14

## 2023-04-04 DIAGNOSIS — G89.3 CANCER ASSOCIATED PAIN: ICD-10-CM

## 2023-04-04 RX ORDER — MORPHINE SULFATE 15 MG/1
30 TABLET, FILM COATED, EXTENDED RELEASE ORAL 2 TIMES DAILY
Qty: 90 TABLET | Refills: 0 | Status: CANCELLED | OUTPATIENT
Start: 2023-04-04

## 2023-04-04 NOTE — TELEPHONE ENCOUNTER
Rx Refill Note  Requested Prescriptions     Pending Prescriptions Disp Refills   • Morphine (MS CONTIN) 15 MG 12 hr tablet 90 tablet 0     Sig: Take 2 tablets by mouth 2 (Two) Times a Day.      Last office visit with prescribing clinician: 3/31/2023   Last telemedicine visit with prescribing clinician: 4/14/2023   Next office visit with prescribing clinician: 5/5/2023                         Would you like a call back once the refill request has been completed: [] Yes [] No    If the office needs to give you a call back, can they leave a voicemail: [] Yes [] No    Chaparro Campos Rep  04/04/23, 15:13 CDT

## 2023-04-04 NOTE — TELEPHONE ENCOUNTER
Incoming Refill Request      Medication requested (name and dose): morphine  Pharm[facility] where request should be sent: [facility]    Additional details provided by patient: no doses left     Best call back number: [phone]    Does the patient have less than a 3 day supply:  [x] Yes  [] No    Chaparro Rendon Rep  04/04/23, 14:20 CDT

## 2023-04-05 DIAGNOSIS — G89.3 CANCER ASSOCIATED PAIN: ICD-10-CM

## 2023-04-05 RX ORDER — MORPHINE SULFATE 15 MG/1
30 TABLET, FILM COATED, EXTENDED RELEASE ORAL 2 TIMES DAILY
Qty: 90 TABLET | Refills: 0 | Status: SHIPPED | OUTPATIENT
Start: 2023-04-05

## 2023-04-12 ENCOUNTER — TELEPHONE (OUTPATIENT)
Dept: ONCOLOGY | Facility: HOSPITAL | Age: 63
End: 2023-04-12
Payer: OTHER GOVERNMENT

## 2023-04-12 NOTE — TELEPHONE ENCOUNTER
Attempted to call patient to ask her to come for lab work on Thursday for chemo scheduled on Friday.  No answer at cell phone number given.  Will attempt to call tomorrow.

## 2023-04-13 ENCOUNTER — INFUSION (OUTPATIENT)
Dept: ONCOLOGY | Facility: HOSPITAL | Age: 63
End: 2023-04-13
Payer: OTHER GOVERNMENT

## 2023-04-13 DIAGNOSIS — C50.919 MALIGNANT NEOPLASM OF FEMALE BREAST, UNSPECIFIED ESTROGEN RECEPTOR STATUS, UNSPECIFIED LATERALITY, UNSPECIFIED SITE OF BREAST: Primary | ICD-10-CM

## 2023-04-13 LAB
ALBUMIN SERPL-MCNC: 3.6 G/DL (ref 3.5–5.2)
ALBUMIN/GLOB SERPL: 1.5 G/DL
ALP SERPL-CCNC: 81 U/L (ref 39–117)
ALT SERPL W P-5'-P-CCNC: 7 U/L (ref 1–33)
ANION GAP SERPL CALCULATED.3IONS-SCNC: 7 MMOL/L (ref 5–15)
AST SERPL-CCNC: 11 U/L (ref 1–32)
BASOPHILS # BLD AUTO: NORMAL 10*3/UL
BASOPHILS NFR BLD AUTO: NORMAL %
BILIRUB SERPL-MCNC: 0.2 MG/DL (ref 0–1.2)
BUN SERPL-MCNC: 5 MG/DL (ref 8–23)
BUN/CREAT SERPL: 11.1 (ref 7–25)
CALCIUM SPEC-SCNC: 8.3 MG/DL (ref 8.6–10.5)
CHLORIDE SERPL-SCNC: 109 MMOL/L (ref 98–107)
CO2 SERPL-SCNC: 25 MMOL/L (ref 22–29)
CREAT SERPL-MCNC: 0.45 MG/DL (ref 0.57–1)
EGFRCR SERPLBLD CKD-EPI 2021: 107.6 ML/MIN/1.73
EOSINOPHIL # BLD AUTO: NORMAL 10*3/UL
EOSINOPHIL NFR BLD AUTO: NORMAL %
ERYTHROCYTE [DISTWIDTH] IN BLOOD BY AUTOMATED COUNT: NORMAL %
GLOBULIN UR ELPH-MCNC: 2.4 GM/DL
GLUCOSE SERPL-MCNC: 116 MG/DL (ref 65–99)
HCT VFR BLD AUTO: NORMAL %
HGB BLD-MCNC: NORMAL G/DL
LYMPHOCYTES # BLD AUTO: NORMAL 10*3/UL
LYMPHOCYTES NFR BLD AUTO: NORMAL %
MCH RBC QN AUTO: NORMAL PG
MCHC RBC AUTO-ENTMCNC: NORMAL G/DL
MCV RBC AUTO: NORMAL FL
MONOCYTES # BLD AUTO: NORMAL 10*3/UL
MONOCYTES NFR BLD AUTO: NORMAL %
NEUTROPHILS NFR BLD AUTO: NORMAL %
NEUTROPHILS NFR BLD AUTO: NORMAL %
PLATELET # BLD AUTO: NORMAL 10*3/UL
POTASSIUM SERPL-SCNC: 4.1 MMOL/L (ref 3.5–5.2)
PROT SERPL-MCNC: 6 G/DL (ref 6–8.5)
RBC # BLD AUTO: NORMAL 10*6/UL
SODIUM SERPL-SCNC: 141 MMOL/L (ref 136–145)
WBC NRBC COR # BLD: NORMAL 10*3/UL

## 2023-04-13 PROCEDURE — 80053 COMPREHEN METABOLIC PANEL: CPT

## 2023-04-13 PROCEDURE — 85025 COMPLETE CBC W/AUTO DIFF WBC: CPT

## 2023-04-13 PROCEDURE — 36591 DRAW BLOOD OFF VENOUS DEVICE: CPT | Performed by: INTERNAL MEDICINE

## 2023-04-13 PROCEDURE — 25010000002 HEPARIN LOCK FLUSH PER 10 UNITS: Performed by: INTERNAL MEDICINE

## 2023-04-13 RX ORDER — HEPARIN SODIUM (PORCINE) LOCK FLUSH IV SOLN 100 UNIT/ML 100 UNIT/ML
500 SOLUTION INTRAVENOUS AS NEEDED
Status: DISCONTINUED | OUTPATIENT
Start: 2023-04-13 | End: 2023-04-13 | Stop reason: HOSPADM

## 2023-04-13 RX ORDER — SODIUM CHLORIDE 0.9 % (FLUSH) 0.9 %
10 SYRINGE (ML) INJECTION AS NEEDED
Status: CANCELLED | OUTPATIENT
Start: 2023-04-13

## 2023-04-13 RX ORDER — SODIUM CHLORIDE 0.9 % (FLUSH) 0.9 %
10 SYRINGE (ML) INJECTION AS NEEDED
Status: DISCONTINUED | OUTPATIENT
Start: 2023-04-13 | End: 2023-04-13 | Stop reason: HOSPADM

## 2023-04-13 RX ORDER — HEPARIN SODIUM (PORCINE) LOCK FLUSH IV SOLN 100 UNIT/ML 100 UNIT/ML
500 SOLUTION INTRAVENOUS AS NEEDED
Status: CANCELLED | OUTPATIENT
Start: 2023-04-13

## 2023-04-13 RX ADMIN — Medication 10 ML: at 15:07

## 2023-04-13 RX ADMIN — HEPARIN 500 UNITS: 100 SYRINGE at 15:07

## 2023-04-14 ENCOUNTER — INFUSION (OUTPATIENT)
Dept: ONCOLOGY | Facility: HOSPITAL | Age: 63
End: 2023-04-14
Payer: OTHER GOVERNMENT

## 2023-04-14 ENCOUNTER — OFFICE VISIT (OUTPATIENT)
Dept: ONCOLOGY | Facility: CLINIC | Age: 63
End: 2023-04-14
Payer: OTHER GOVERNMENT

## 2023-04-14 VITALS
WEIGHT: 128 LBS | BODY MASS INDEX: 22.67 KG/M2 | TEMPERATURE: 96.2 F | DIASTOLIC BLOOD PRESSURE: 74 MMHG | HEART RATE: 85 BPM | OXYGEN SATURATION: 93 % | SYSTOLIC BLOOD PRESSURE: 139 MMHG | RESPIRATION RATE: 18 BRPM

## 2023-04-14 DIAGNOSIS — C50.919 MALIGNANT NEOPLASM OF FEMALE BREAST, UNSPECIFIED ESTROGEN RECEPTOR STATUS, UNSPECIFIED LATERALITY, UNSPECIFIED SITE OF BREAST: Primary | ICD-10-CM

## 2023-04-14 PROCEDURE — 25010000002 DEXAMETHASONE PER 1 MG: Performed by: NURSE PRACTITIONER

## 2023-04-14 PROCEDURE — 25010000002 HEPARIN LOCK FLUSH PER 10 UNITS: Performed by: INTERNAL MEDICINE

## 2023-04-14 RX ORDER — SODIUM CHLORIDE 0.9 % (FLUSH) 0.9 %
10 SYRINGE (ML) INJECTION AS NEEDED
Status: CANCELLED | OUTPATIENT
Start: 2023-04-14

## 2023-04-14 RX ORDER — DEXAMETHASONE SODIUM PHOSPHATE 4 MG/ML
4 INJECTION, SOLUTION INTRA-ARTICULAR; INTRALESIONAL; INTRAMUSCULAR; INTRAVENOUS; SOFT TISSUE ONCE
Status: COMPLETED | OUTPATIENT
Start: 2023-04-14 | End: 2023-04-14

## 2023-04-14 RX ORDER — HEPARIN SODIUM (PORCINE) LOCK FLUSH IV SOLN 100 UNIT/ML 100 UNIT/ML
500 SOLUTION INTRAVENOUS AS NEEDED
Status: DISCONTINUED | OUTPATIENT
Start: 2023-04-14 | End: 2023-04-14 | Stop reason: HOSPADM

## 2023-04-14 RX ORDER — SODIUM CHLORIDE 0.9 % (FLUSH) 0.9 %
10 SYRINGE (ML) INJECTION AS NEEDED
Status: DISCONTINUED | OUTPATIENT
Start: 2023-04-14 | End: 2023-04-14 | Stop reason: HOSPADM

## 2023-04-14 RX ORDER — DEXAMETHASONE SODIUM PHOSPHATE 4 MG/ML
4 INJECTION, SOLUTION INTRA-ARTICULAR; INTRALESIONAL; INTRAMUSCULAR; INTRAVENOUS; SOFT TISSUE ONCE
Status: CANCELLED
Start: 2023-04-14 | End: 2023-04-14

## 2023-04-14 RX ORDER — HEPARIN SODIUM (PORCINE) LOCK FLUSH IV SOLN 100 UNIT/ML 100 UNIT/ML
500 SOLUTION INTRAVENOUS AS NEEDED
Status: CANCELLED | OUTPATIENT
Start: 2023-04-14

## 2023-04-14 RX ADMIN — DEXAMETHASONE SODIUM PHOSPHATE 4 MG: 4 INJECTION, SOLUTION INTRAMUSCULAR; INTRAVENOUS at 10:13

## 2023-04-14 RX ADMIN — Medication 10 ML: at 12:02

## 2023-04-14 RX ADMIN — HEPARIN 500 UNITS: 100 SYRINGE at 12:02

## 2023-04-14 RX ADMIN — SODIUM CHLORIDE 1000 ML: 9 INJECTION, SOLUTION INTRAVENOUS at 10:01

## 2023-04-18 NOTE — PROGRESS NOTES
DATE OF VISIT: 4/14/2023      REASON FOR VISIT:  Metastatic breast cancer; currently on Trodelvy chemotherapy      HISTORY OF PRESENT ILLNESS:   62 yr old female with metastatic breast cancer with distant mediastinal metastases.  Currently on Trodelvy and tolerating well.  She is due for cycle cycle 9 day 1  She is having more fatigue and weakness with most recent infusion. Her daughter is getting  tomorrow.  Denies any fever or chills; pain is stable.     Past Medical History, Past Surgical History, Social History, Family History have been reviewed and are without significant changes except as mentioned.    Review of Systems   A comprehensive 14 point review of systems was performed and was negative except as mentioned.    Medications:  The current medication list was reviewed in the EMR    ALLERGIES:    Allergies   Allergen Reactions   • Percocet [Oxycodone-Acetaminophen] Nausea And Vomiting       Objective      Vitals:    04/14/23 0857   BP: 139/74   Pulse: 85   Resp: 18   Temp: 96.2 °F (35.7 °C)   SpO2: 93%   Weight: 58.1 kg (128 lb)   PainSc: 0-No pain         3/31/2023     9:17 AM   Current Status   ECOG score 0       Physical Exam  General: alert and oriented no acute distress  Lungs; normal breath sounds  Card; RRR  Ext; no edema    RECENT LABS:  Glucose   Date Value Ref Range Status   04/13/2023 116 (H) 65 - 99 mg/dL Final     Sodium   Date Value Ref Range Status   04/13/2023 141 136 - 145 mmol/L Final     Potassium   Date Value Ref Range Status   04/13/2023 4.1 3.5 - 5.2 mmol/L Final     CO2   Date Value Ref Range Status   04/13/2023 25.0 22.0 - 29.0 mmol/L Final     Chloride   Date Value Ref Range Status   04/13/2023 109 (H) 98 - 107 mmol/L Final     Anion Gap   Date Value Ref Range Status   04/13/2023 7.0 5.0 - 15.0 mmol/L Final     Creatinine   Date Value Ref Range Status   04/13/2023 0.45 (L) 0.57 - 1.00 mg/dL Final     BUN   Date Value Ref Range Status   04/13/2023 5 (L) 8 - 23 mg/dL Final      BUN/Creatinine Ratio   Date Value Ref Range Status   04/13/2023 11.1 7.0 - 25.0 Final     Calcium   Date Value Ref Range Status   04/13/2023 8.3 (L) 8.6 - 10.5 mg/dL Final     Alkaline Phosphatase   Date Value Ref Range Status   04/13/2023 81 39 - 117 U/L Final     Total Protein   Date Value Ref Range Status   04/13/2023 6.0 6.0 - 8.5 g/dL Final     ALT (SGPT)   Date Value Ref Range Status   04/13/2023 7 1 - 33 U/L Final     AST (SGOT)   Date Value Ref Range Status   04/13/2023 11 1 - 32 U/L Final     Total Bilirubin   Date Value Ref Range Status   04/13/2023 0.2 0.0 - 1.2 mg/dL Final     Albumin   Date Value Ref Range Status   04/13/2023 3.6 3.5 - 5.2 g/dL Final     Globulin   Date Value Ref Range Status   04/13/2023 2.4 gm/dL Final     Lab Results   Component Value Date    WBC  04/13/2023      Comment:      See scanned report      HGB  04/13/2023      Comment:      See scanned report      HCT  04/13/2023      Comment:      See scanned report      MCV  04/13/2023      Comment:      See scanned report      PLT  04/13/2023      Comment:      See scanned report       Lab Results   Component Value Date    NEUTROABS 2.90 03/31/2023     No results found for: , LABCA2, AFPTM, HCGQUANT, , CHROMGRNA, [id number], CEA, REFLABREPO      PATHOLOGY:  * Cannot find OR log *         RADIOLOGY DATA :  No radiology results for the last 7 days        Assessment & Plan      1. Metastatic breast cancer with distant LN metastases  -chronic and stable; currently on Trodelvy and tolerating with out any major side effects.  -labs are stable  -she is having more fatigue and weakness; discussed with her about delaying her chemo by one week so that she is not too fatigued for her daughter wedding tomorrow.  -she will receive some IV fluids with steroid today  -RTC one week with chemo and labs.       2. Cancer associated pain  -chronic and stable on current pain medication.      3. Right upper extremity DVT    -Chronic, stable.    -She is on eliquis.   -No new concern for bleeding or thrombosis.   -Continue close monitoring on chemotherapy.      4. Mucositis      -Chronic, stable.   -Magic mouthwash was needed.      5. Unintentional weight loss      Chronic, stable.   Recommend high calori high protein diet.       6. Chemotherapy induce peripheral neuropathy      Chronic, stable.   She is on neurontin 400 mg TID.   She will continue this.            PHQ-9 Total Score: 0     Radha Duarte reports a pain score of 0.  Given her pain assessment as noted, treatment options were discussed and the following options were decided upon as a follow-up plan to address the patient's pain: continuation of current treatment plan for pain.         Estefanía Brady, APRN  4/18/2023  10:24 CDT        Part of this note may be an electronic transcription/translation of spoken language to printed text using the Dragon Dictation System.          CC:

## 2023-04-19 DIAGNOSIS — T45.1X5A CHEMOTHERAPY-INDUCED PERIPHERAL NEUROPATHY: ICD-10-CM

## 2023-04-19 DIAGNOSIS — G62.0 CHEMOTHERAPY-INDUCED PERIPHERAL NEUROPATHY: ICD-10-CM

## 2023-04-20 RX ORDER — GABAPENTIN 400 MG/1
CAPSULE ORAL
Qty: 270 CAPSULE | Refills: 0 | Status: SHIPPED | OUTPATIENT
Start: 2023-04-20

## 2023-04-20 NOTE — TELEPHONE ENCOUNTER
Rx Refill Note  Requested Prescriptions     Pending Prescriptions Disp Refills   • gabapentin (NEURONTIN) 400 MG capsule [Pharmacy Med Name: GABAPENTIN CAPS 400MG] 270 capsule 0     Sig: TAKE 1 CAPSULE THREE TIMES A DAY      Last office visit with prescribing clinician: 3/31/2023   Last telemedicine visit with prescribing clinician: 4/21/2023   Next office visit with prescribing clinician: 5/5/2023                         Would you like a call back once the refill request has been completed: [] Yes [] No    If the office needs to give you a call back, can they leave a voicemail: [] Yes [] No    Chaparro Campos Rep  04/20/23, 07:59 CDT

## 2023-04-21 ENCOUNTER — OFFICE VISIT (OUTPATIENT)
Dept: ONCOLOGY | Facility: CLINIC | Age: 63
End: 2023-04-21
Payer: OTHER GOVERNMENT

## 2023-04-21 ENCOUNTER — INFUSION (OUTPATIENT)
Dept: ONCOLOGY | Facility: HOSPITAL | Age: 63
End: 2023-04-21
Payer: OTHER GOVERNMENT

## 2023-04-21 VITALS
TEMPERATURE: 97.5 F | DIASTOLIC BLOOD PRESSURE: 57 MMHG | OXYGEN SATURATION: 98 % | SYSTOLIC BLOOD PRESSURE: 130 MMHG | BODY MASS INDEX: 22.76 KG/M2 | HEART RATE: 89 BPM | WEIGHT: 128.5 LBS

## 2023-04-21 DIAGNOSIS — C50.919 MALIGNANT NEOPLASM OF FEMALE BREAST, UNSPECIFIED ESTROGEN RECEPTOR STATUS, UNSPECIFIED LATERALITY, UNSPECIFIED SITE OF BREAST: Primary | ICD-10-CM

## 2023-04-21 DIAGNOSIS — C50.919 MALIGNANT NEOPLASM OF FEMALE BREAST, UNSPECIFIED ESTROGEN RECEPTOR STATUS, UNSPECIFIED LATERALITY, UNSPECIFIED SITE OF BREAST: ICD-10-CM

## 2023-04-21 LAB
ALBUMIN SERPL-MCNC: 3.6 G/DL (ref 3.5–5.2)
ALBUMIN/GLOB SERPL: 1.5 G/DL
ALP SERPL-CCNC: 88 U/L (ref 39–117)
ALT SERPL W P-5'-P-CCNC: 8 U/L (ref 1–33)
ANION GAP SERPL CALCULATED.3IONS-SCNC: 9 MMOL/L (ref 5–15)
AST SERPL-CCNC: 17 U/L (ref 1–32)
BASOPHILS # BLD AUTO: 0.03 10*3/MM3 (ref 0–0.2)
BASOPHILS NFR BLD AUTO: 0.7 % (ref 0–1.5)
BILIRUB SERPL-MCNC: 0.2 MG/DL (ref 0–1.2)
BUN SERPL-MCNC: 8 MG/DL (ref 8–23)
BUN/CREAT SERPL: 14 (ref 7–25)
CALCIUM SPEC-SCNC: 8.9 MG/DL (ref 8.6–10.5)
CHLORIDE SERPL-SCNC: 106 MMOL/L (ref 98–107)
CO2 SERPL-SCNC: 24 MMOL/L (ref 22–29)
CREAT SERPL-MCNC: 0.57 MG/DL (ref 0.57–1)
DEPRECATED RDW RBC AUTO: 51.9 FL (ref 37–54)
EGFRCR SERPLBLD CKD-EPI 2021: 101.6 ML/MIN/1.73
EOSINOPHIL # BLD AUTO: 0.12 10*3/MM3 (ref 0–0.4)
EOSINOPHIL NFR BLD AUTO: 2.6 % (ref 0.3–6.2)
ERYTHROCYTE [DISTWIDTH] IN BLOOD BY AUTOMATED COUNT: 14.6 % (ref 12.3–15.4)
GLOBULIN UR ELPH-MCNC: 2.4 GM/DL
GLUCOSE SERPL-MCNC: 94 MG/DL (ref 65–99)
HCT VFR BLD AUTO: 31.4 % (ref 34–46.6)
HGB BLD-MCNC: 10.2 G/DL (ref 12–15.9)
HOLD SPECIMEN: NORMAL
IMM GRANULOCYTES # BLD AUTO: 0.03 10*3/MM3 (ref 0–0.05)
IMM GRANULOCYTES NFR BLD AUTO: 0.7 % (ref 0–0.5)
LYMPHOCYTES # BLD AUTO: 0.89 10*3/MM3 (ref 0.7–3.1)
LYMPHOCYTES NFR BLD AUTO: 19.4 % (ref 19.6–45.3)
MCH RBC QN AUTO: 31.5 PG (ref 26.6–33)
MCHC RBC AUTO-ENTMCNC: 32.5 G/DL (ref 31.5–35.7)
MCV RBC AUTO: 96.9 FL (ref 79–97)
MONOCYTES # BLD AUTO: 0.48 10*3/MM3 (ref 0.1–0.9)
MONOCYTES NFR BLD AUTO: 10.5 % (ref 5–12)
NEUTROPHILS NFR BLD AUTO: 3.04 10*3/MM3 (ref 1.7–7)
NEUTROPHILS NFR BLD AUTO: 66.1 % (ref 42.7–76)
NRBC BLD AUTO-RTO: 0 /100 WBC (ref 0–0.2)
PLATELET # BLD AUTO: 156 10*3/MM3 (ref 140–450)
PMV BLD AUTO: 9.7 FL (ref 6–12)
POTASSIUM SERPL-SCNC: 4.1 MMOL/L (ref 3.5–5.2)
PROT SERPL-MCNC: 6 G/DL (ref 6–8.5)
RBC # BLD AUTO: 3.24 10*6/MM3 (ref 3.77–5.28)
SODIUM SERPL-SCNC: 139 MMOL/L (ref 136–145)
WBC NRBC COR # BLD: 4.59 10*3/MM3 (ref 3.4–10.8)

## 2023-04-21 PROCEDURE — 36415 COLL VENOUS BLD VENIPUNCTURE: CPT

## 2023-04-21 PROCEDURE — 25010000002 DEXAMETHASONE SODIUM PHOSPHATE 100 MG/10ML SOLUTION: Performed by: NURSE PRACTITIONER

## 2023-04-21 PROCEDURE — 25010000002 HEPARIN LOCK FLUSH PER 10 UNITS: Performed by: INTERNAL MEDICINE

## 2023-04-21 PROCEDURE — 85025 COMPLETE CBC W/AUTO DIFF WBC: CPT

## 2023-04-21 PROCEDURE — 25010000002 FOSAPREPITANT PER 1 MG: Performed by: NURSE PRACTITIONER

## 2023-04-21 PROCEDURE — 25010000002 DIPHENHYDRAMINE PER 50 MG: Performed by: NURSE PRACTITIONER

## 2023-04-21 PROCEDURE — 80053 COMPREHEN METABOLIC PANEL: CPT

## 2023-04-21 PROCEDURE — 25010000002 SACITUZUMAB GOVITECAN-HZIY 180 MG RECONSTITUTED SOLUTION 1 EACH VIAL: Performed by: NURSE PRACTITIONER

## 2023-04-21 PROCEDURE — 25010000002 PALONOSETRON PER 25 MCG: Performed by: NURSE PRACTITIONER

## 2023-04-21 RX ORDER — SODIUM CHLORIDE 9 MG/ML
250 INJECTION, SOLUTION INTRAVENOUS ONCE
Status: COMPLETED | OUTPATIENT
Start: 2023-04-21 | End: 2023-04-21

## 2023-04-21 RX ORDER — SODIUM CHLORIDE 9 MG/ML
250 INJECTION, SOLUTION INTRAVENOUS ONCE
Status: CANCELLED | OUTPATIENT
Start: 2023-04-21

## 2023-04-21 RX ORDER — FAMOTIDINE 10 MG/ML
20 INJECTION, SOLUTION INTRAVENOUS AS NEEDED
Status: CANCELLED | OUTPATIENT
Start: 2023-04-21

## 2023-04-21 RX ORDER — ATROPINE SULFATE 1 MG/ML
0.25 INJECTION, SOLUTION INTRAMUSCULAR; INTRAVENOUS; SUBCUTANEOUS
Status: DISCONTINUED | OUTPATIENT
Start: 2023-04-21 | End: 2023-04-21 | Stop reason: HOSPADM

## 2023-04-21 RX ORDER — ATROPINE SULFATE 1 MG/ML
0.25 INJECTION, SOLUTION INTRAMUSCULAR; INTRAVENOUS; SUBCUTANEOUS
Status: CANCELLED | OUTPATIENT
Start: 2023-04-21

## 2023-04-21 RX ORDER — FAMOTIDINE 10 MG/ML
20 INJECTION, SOLUTION INTRAVENOUS ONCE
Status: CANCELLED | OUTPATIENT
Start: 2023-04-21

## 2023-04-21 RX ORDER — OLANZAPINE 5 MG/1
5 TABLET ORAL ONCE
Status: COMPLETED | OUTPATIENT
Start: 2023-04-21 | End: 2023-04-21

## 2023-04-21 RX ORDER — FAMOTIDINE 10 MG/ML
20 INJECTION, SOLUTION INTRAVENOUS ONCE
Status: COMPLETED | OUTPATIENT
Start: 2023-04-21 | End: 2023-04-21

## 2023-04-21 RX ORDER — FAMOTIDINE 10 MG/ML
20 INJECTION, SOLUTION INTRAVENOUS AS NEEDED
Status: DISCONTINUED | OUTPATIENT
Start: 2023-04-21 | End: 2023-04-21 | Stop reason: HOSPADM

## 2023-04-21 RX ORDER — SODIUM CHLORIDE 0.9 % (FLUSH) 0.9 %
10 SYRINGE (ML) INJECTION AS NEEDED
OUTPATIENT
Start: 2023-04-21

## 2023-04-21 RX ORDER — HEPARIN SODIUM (PORCINE) LOCK FLUSH IV SOLN 100 UNIT/ML 100 UNIT/ML
500 SOLUTION INTRAVENOUS AS NEEDED
OUTPATIENT
Start: 2023-04-21

## 2023-04-21 RX ORDER — ACETAMINOPHEN 325 MG/1
650 TABLET ORAL ONCE
Status: CANCELLED | OUTPATIENT
Start: 2023-04-21

## 2023-04-21 RX ORDER — HEPARIN SODIUM (PORCINE) LOCK FLUSH IV SOLN 100 UNIT/ML 100 UNIT/ML
500 SOLUTION INTRAVENOUS AS NEEDED
Status: DISCONTINUED | OUTPATIENT
Start: 2023-04-21 | End: 2023-04-21 | Stop reason: HOSPADM

## 2023-04-21 RX ORDER — PALONOSETRON 0.05 MG/ML
0.25 INJECTION, SOLUTION INTRAVENOUS ONCE
Status: COMPLETED | OUTPATIENT
Start: 2023-04-21 | End: 2023-04-21

## 2023-04-21 RX ORDER — DIPHENHYDRAMINE HYDROCHLORIDE 50 MG/ML
50 INJECTION INTRAMUSCULAR; INTRAVENOUS AS NEEDED
Status: DISCONTINUED | OUTPATIENT
Start: 2023-04-21 | End: 2023-04-21 | Stop reason: HOSPADM

## 2023-04-21 RX ORDER — METHYLPREDNISOLONE SODIUM SUCCINATE 125 MG/2ML
125 INJECTION, POWDER, LYOPHILIZED, FOR SOLUTION INTRAMUSCULAR; INTRAVENOUS AS NEEDED
Status: DISCONTINUED | OUTPATIENT
Start: 2023-04-21 | End: 2023-04-21 | Stop reason: HOSPADM

## 2023-04-21 RX ORDER — SODIUM CHLORIDE 0.9 % (FLUSH) 0.9 %
10 SYRINGE (ML) INJECTION AS NEEDED
Status: DISCONTINUED | OUTPATIENT
Start: 2023-04-21 | End: 2023-04-21 | Stop reason: HOSPADM

## 2023-04-21 RX ORDER — DIPHENHYDRAMINE HYDROCHLORIDE 50 MG/ML
50 INJECTION INTRAMUSCULAR; INTRAVENOUS AS NEEDED
Status: CANCELLED | OUTPATIENT
Start: 2023-04-21

## 2023-04-21 RX ORDER — PALONOSETRON 0.05 MG/ML
0.25 INJECTION, SOLUTION INTRAVENOUS ONCE
Status: CANCELLED | OUTPATIENT
Start: 2023-04-21

## 2023-04-21 RX ORDER — ACETAMINOPHEN 325 MG/1
650 TABLET ORAL ONCE
Status: COMPLETED | OUTPATIENT
Start: 2023-04-21 | End: 2023-04-21

## 2023-04-21 RX ORDER — OLANZAPINE 5 MG/1
5 TABLET ORAL ONCE
Status: CANCELLED | OUTPATIENT
Start: 2023-04-21 | End: 2023-04-21

## 2023-04-21 RX ADMIN — HEPARIN 500 UNITS: 100 SYRINGE at 13:05

## 2023-04-21 RX ADMIN — Medication 10 ML: at 13:05

## 2023-04-21 RX ADMIN — FAMOTIDINE 20 MG: 10 INJECTION INTRAVENOUS at 09:57

## 2023-04-21 RX ADMIN — DIPHENHYDRAMINE HYDROCHLORIDE 25 MG: 50 INJECTION, SOLUTION INTRAMUSCULAR; INTRAVENOUS at 10:46

## 2023-04-21 RX ADMIN — SODIUM CHLORIDE 250 ML: 9 INJECTION, SOLUTION INTRAVENOUS at 09:55

## 2023-04-21 RX ADMIN — FOSAPREPITANT 100 ML: 150 INJECTION, POWDER, LYOPHILIZED, FOR SOLUTION INTRAVENOUS at 10:05

## 2023-04-21 RX ADMIN — PALONOSETRON 0.25 MG: 0.05 INJECTION, SOLUTION INTRAVENOUS at 10:03

## 2023-04-21 RX ADMIN — ACETAMINOPHEN 650 MG: 325 TABLET ORAL at 09:57

## 2023-04-21 RX ADMIN — SACITUZUMAB GOVITECAN 460 MG: 180 POWDER, FOR SOLUTION INTRAVENOUS at 11:48

## 2023-04-21 RX ADMIN — DEXAMETHASONE SODIUM PHOSPHATE 12 MG: 10 INJECTION, SOLUTION INTRAMUSCULAR; INTRAVENOUS at 11:12

## 2023-04-21 RX ADMIN — OLANZAPINE 5 MG: 5 TABLET, FILM COATED ORAL at 09:58

## 2023-04-25 NOTE — PROGRESS NOTES
DATE OF VISIT: 4/21/2023      REASON FOR VISIT:  Metastatic breast cancer; currently on Trodelvy chemotherapy        HISTORY OF PRESENT ILLNESS:   62 yr old female with metastatic breast cancer with distant mediastinal metastases.  Currently on Trodelvy and tolerating well.  She was scheduled for cycle 9 day 1 last Friday but her daughter was getting  the next day so we discussed delaying her treatment by one week and giving her IV fluids only as she was experiencing some fatigue.  She is here today for one week follow-up;states she is feeling much better and is grateful that she was able to attend and enjoy her daughter wedding.  She denies any fever or chills; pain is currently controlled on her pain medication.        Past Medical History, Past Surgical History, Social History, Family History have been reviewed and are without significant changes except as mentioned.    Review of Systems   A comprehensive 14 point review of systems was performed and was negative except as mentioned.    Medications:  The current medication list was reviewed in the EMR    ALLERGIES:    Allergies   Allergen Reactions   • Percocet [Oxycodone-Acetaminophen] Nausea And Vomiting       Objective      Vitals:    04/21/23 0849   BP: 130/57   Pulse: 89   Temp: 97.5 °F (36.4 °C)   SpO2: 98%   Weight: 58.3 kg (128 lb 8 oz)   PainSc: 0-No pain         4/21/2023     9:40 AM   Current Status   ECOG score 0       Physical Exam  General:alert and oriented no acute distress  Lungs; normal breath sounds  Card:RRR  Ext; no edema    RECENT LABS:  Glucose   Date Value Ref Range Status   04/21/2023 94 65 - 99 mg/dL Final     Sodium   Date Value Ref Range Status   04/21/2023 139 136 - 145 mmol/L Final     Potassium   Date Value Ref Range Status   04/21/2023 4.1 3.5 - 5.2 mmol/L Final     CO2   Date Value Ref Range Status   04/21/2023 24.0 22.0 - 29.0 mmol/L Final     Chloride   Date Value Ref Range Status   04/21/2023 106 98 - 107 mmol/L Final      Anion Gap   Date Value Ref Range Status   04/21/2023 9.0 5.0 - 15.0 mmol/L Final     Creatinine   Date Value Ref Range Status   04/21/2023 0.57 0.57 - 1.00 mg/dL Final     BUN   Date Value Ref Range Status   04/21/2023 8 8 - 23 mg/dL Final     BUN/Creatinine Ratio   Date Value Ref Range Status   04/21/2023 14.0 7.0 - 25.0 Final     Calcium   Date Value Ref Range Status   04/21/2023 8.9 8.6 - 10.5 mg/dL Final     Alkaline Phosphatase   Date Value Ref Range Status   04/21/2023 88 39 - 117 U/L Final     Total Protein   Date Value Ref Range Status   04/21/2023 6.0 6.0 - 8.5 g/dL Final     ALT (SGPT)   Date Value Ref Range Status   04/21/2023 8 1 - 33 U/L Final     AST (SGOT)   Date Value Ref Range Status   04/21/2023 17 1 - 32 U/L Final     Total Bilirubin   Date Value Ref Range Status   04/21/2023 0.2 0.0 - 1.2 mg/dL Final     Albumin   Date Value Ref Range Status   04/21/2023 3.6 3.5 - 5.2 g/dL Final     Globulin   Date Value Ref Range Status   04/21/2023 2.4 gm/dL Final     Lab Results   Component Value Date    WBC 4.59 04/21/2023    HGB 10.2 (L) 04/21/2023    HCT 31.4 (L) 04/21/2023    MCV 96.9 04/21/2023     04/21/2023     Lab Results   Component Value Date    NEUTROABS 3.04 04/21/2023     No results found for: , LABCA2, AFPTM, HCGQUANT, , CHROMGRNA, [id number], CEA, REFLABREPO            RADIOLOGY DATA :  No radiology results for the last 7 days        Assessment & Plan       1. Metastatic breast cancer with distant LN metastases  -chronic and stable; currently on Trodelvy and tolerating with out any major side effects.  -labs are stable  -will sign and approve for cycle 9 day 1 today and she will return to clinic next Friday for day 8 with labs.         2. Cancer associated pain  -chronic and stable on current pain medication.      3. Right upper extremity DVT    -Chronic, stable.   -She is on eliquis.   -No new concern for bleeding or thrombosis.   -Continue close monitoring on  chemotherapy.      4. Mucositis      -Chronic, stable.   -Magic mouthwash was needed.      5. Unintentional weight loss      Chronic, stable.   Recommend high calori high protein diet.         6. Chemotherapy induce peripheral neuropathy      Chronic, stable.   She is on neurontin 400 mg TID.   She will continue this.                 PHQ-9 Total Score: 0     Radha Duarte reports a pain score of 0.  Given her pain assessment as noted, treatment options were discussed and the following options were decided upon as a follow-up plan to address the patient's pain: continuation of current treatment plan for pain.         Estefanía Brady, APRN  4/25/2023  10:44 CDT        Part of this note may be an electronic transcription/translation of spoken language to printed text using the Dragon Dictation System.          CC:

## 2023-04-27 DIAGNOSIS — G89.3 CANCER ASSOCIATED PAIN: ICD-10-CM

## 2023-04-27 RX ORDER — MORPHINE SULFATE 15 MG/1
30 TABLET, FILM COATED, EXTENDED RELEASE ORAL 2 TIMES DAILY
Qty: 90 TABLET | Refills: 0 | Status: SHIPPED | OUTPATIENT
Start: 2023-04-27

## 2023-04-27 NOTE — TELEPHONE ENCOUNTER
Incoming Refill Request      Medication requested (name and dose): Morphine (MS CONTIN) 15 MG 12 hr tablet [[id number]] (Order [id number])        Pharmacy where request should be sent:  [facility]     Additional details provided by patient: Patient states she will be out tomorrow. She is wanting it to go to [facility] on [facility]     Best call back number: [phone]    Does the patient have less than a 3 day supply:  [x] Yes  [] No    Chaparro Rendon Rep  04/27/23, 13:31 CDT

## 2023-04-27 NOTE — TELEPHONE ENCOUNTER
Rx Refill Note  Requested Prescriptions     Pending Prescriptions Disp Refills   • Morphine (MS CONTIN) 15 MG 12 hr tablet 90 tablet 0     Sig: Take 2 tablets by mouth 2 (Two) Times a Day.      Last office visit with prescribing clinician: 3/31/2023   Last telemedicine visit with prescribing clinician: 5/12/2023   Next office visit with prescribing clinician: 5/12/2023                         Would you like a call back once the refill request has been completed: [] Yes [] No    If the office needs to give you a call back, can they leave a voicemail: [] Yes [] No    Chaparro Campos Rep  04/27/23, 13:37 CDT

## 2023-04-28 ENCOUNTER — INFUSION (OUTPATIENT)
Dept: ONCOLOGY | Facility: HOSPITAL | Age: 63
End: 2023-04-28
Payer: OTHER GOVERNMENT

## 2023-04-28 VITALS
DIASTOLIC BLOOD PRESSURE: 58 MMHG | TEMPERATURE: 98.1 F | OXYGEN SATURATION: 95 % | HEART RATE: 94 BPM | SYSTOLIC BLOOD PRESSURE: 136 MMHG | RESPIRATION RATE: 18 BRPM

## 2023-04-28 DIAGNOSIS — C50.919 MALIGNANT NEOPLASM OF FEMALE BREAST, UNSPECIFIED ESTROGEN RECEPTOR STATUS, UNSPECIFIED LATERALITY, UNSPECIFIED SITE OF BREAST: Primary | ICD-10-CM

## 2023-04-28 DIAGNOSIS — C50.919 MALIGNANT NEOPLASM OF FEMALE BREAST, UNSPECIFIED ESTROGEN RECEPTOR STATUS, UNSPECIFIED LATERALITY, UNSPECIFIED SITE OF BREAST: ICD-10-CM

## 2023-04-28 LAB
ALBUMIN SERPL-MCNC: 3.7 G/DL (ref 3.5–5.2)
ALBUMIN/GLOB SERPL: 1.5 G/DL
ALP SERPL-CCNC: 84 U/L (ref 39–117)
ALT SERPL W P-5'-P-CCNC: 10 U/L (ref 1–33)
ANION GAP SERPL CALCULATED.3IONS-SCNC: 9 MMOL/L (ref 5–15)
AST SERPL-CCNC: 19 U/L (ref 1–32)
BASOPHILS # BLD AUTO: 0.02 10*3/MM3 (ref 0–0.2)
BASOPHILS NFR BLD AUTO: 0.4 % (ref 0–1.5)
BILIRUB SERPL-MCNC: 0.5 MG/DL (ref 0–1.2)
BUN SERPL-MCNC: 10 MG/DL (ref 8–23)
BUN/CREAT SERPL: 17.9 (ref 7–25)
CALCIUM SPEC-SCNC: 8.9 MG/DL (ref 8.6–10.5)
CHLORIDE SERPL-SCNC: 109 MMOL/L (ref 98–107)
CO2 SERPL-SCNC: 26 MMOL/L (ref 22–29)
CREAT SERPL-MCNC: 0.56 MG/DL (ref 0.57–1)
DEPRECATED RDW RBC AUTO: 50.8 FL (ref 37–54)
EGFRCR SERPLBLD CKD-EPI 2021: 102.1 ML/MIN/1.73
EOSINOPHIL # BLD AUTO: 0.08 10*3/MM3 (ref 0–0.4)
EOSINOPHIL NFR BLD AUTO: 1.6 % (ref 0.3–6.2)
ERYTHROCYTE [DISTWIDTH] IN BLOOD BY AUTOMATED COUNT: 14.4 % (ref 12.3–15.4)
GLOBULIN UR ELPH-MCNC: 2.4 GM/DL
GLUCOSE SERPL-MCNC: 113 MG/DL (ref 65–99)
HCT VFR BLD AUTO: 32.7 % (ref 34–46.6)
HGB BLD-MCNC: 10.7 G/DL (ref 12–15.9)
HOLD SPECIMEN: NORMAL
IMM GRANULOCYTES # BLD AUTO: 0.06 10*3/MM3 (ref 0–0.05)
IMM GRANULOCYTES NFR BLD AUTO: 1.2 % (ref 0–0.5)
LYMPHOCYTES # BLD AUTO: 0.87 10*3/MM3 (ref 0.7–3.1)
LYMPHOCYTES NFR BLD AUTO: 17.9 % (ref 19.6–45.3)
MCH RBC QN AUTO: 31.6 PG (ref 26.6–33)
MCHC RBC AUTO-ENTMCNC: 32.7 G/DL (ref 31.5–35.7)
MCV RBC AUTO: 96.5 FL (ref 79–97)
MONOCYTES # BLD AUTO: 0.4 10*3/MM3 (ref 0.1–0.9)
MONOCYTES NFR BLD AUTO: 8.2 % (ref 5–12)
NEUTROPHILS NFR BLD AUTO: 3.43 10*3/MM3 (ref 1.7–7)
NEUTROPHILS NFR BLD AUTO: 70.7 % (ref 42.7–76)
NRBC BLD AUTO-RTO: 0 /100 WBC (ref 0–0.2)
PLATELET # BLD AUTO: 160 10*3/MM3 (ref 140–450)
PMV BLD AUTO: 9.4 FL (ref 6–12)
POTASSIUM SERPL-SCNC: 3.8 MMOL/L (ref 3.5–5.2)
PROT SERPL-MCNC: 6.1 G/DL (ref 6–8.5)
RBC # BLD AUTO: 3.39 10*6/MM3 (ref 3.77–5.28)
SODIUM SERPL-SCNC: 144 MMOL/L (ref 136–145)
WBC NRBC COR # BLD: 4.86 10*3/MM3 (ref 3.4–10.8)

## 2023-04-28 PROCEDURE — 25010000002 HEPARIN LOCK FLUSH PER 10 UNITS: Performed by: INTERNAL MEDICINE

## 2023-04-28 PROCEDURE — 80053 COMPREHEN METABOLIC PANEL: CPT

## 2023-04-28 PROCEDURE — 36415 COLL VENOUS BLD VENIPUNCTURE: CPT

## 2023-04-28 PROCEDURE — 25010000002 DEXAMETHASONE SODIUM PHOSPHATE 100 MG/10ML SOLUTION: Performed by: INTERNAL MEDICINE

## 2023-04-28 PROCEDURE — 85025 COMPLETE CBC W/AUTO DIFF WBC: CPT

## 2023-04-28 PROCEDURE — 25010000002 SACITUZUMAB GOVITECAN-HZIY 180 MG RECONSTITUTED SOLUTION 1 EACH VIAL: Performed by: INTERNAL MEDICINE

## 2023-04-28 PROCEDURE — 25010000002 FOSAPREPITANT PER 1 MG: Performed by: INTERNAL MEDICINE

## 2023-04-28 PROCEDURE — 25010000002 DIPHENHYDRAMINE PER 50 MG: Performed by: INTERNAL MEDICINE

## 2023-04-28 PROCEDURE — 25010000002 PALONOSETRON PER 25 MCG: Performed by: INTERNAL MEDICINE

## 2023-04-28 RX ORDER — DIPHENHYDRAMINE HYDROCHLORIDE 50 MG/ML
50 INJECTION INTRAMUSCULAR; INTRAVENOUS AS NEEDED
Status: DISCONTINUED | OUTPATIENT
Start: 2023-04-28 | End: 2023-04-28 | Stop reason: HOSPADM

## 2023-04-28 RX ORDER — OLANZAPINE 5 MG/1
5 TABLET ORAL ONCE
Status: CANCELLED | OUTPATIENT
Start: 2023-04-28 | End: 2023-04-28

## 2023-04-28 RX ORDER — DIPHENHYDRAMINE HYDROCHLORIDE 50 MG/ML
50 INJECTION INTRAMUSCULAR; INTRAVENOUS AS NEEDED
Status: CANCELLED | OUTPATIENT
Start: 2023-04-28

## 2023-04-28 RX ORDER — FAMOTIDINE 10 MG/ML
20 INJECTION, SOLUTION INTRAVENOUS ONCE
Status: CANCELLED | OUTPATIENT
Start: 2023-04-28

## 2023-04-28 RX ORDER — OLANZAPINE 5 MG/1
5 TABLET ORAL ONCE
Status: COMPLETED | OUTPATIENT
Start: 2023-04-28 | End: 2023-04-28

## 2023-04-28 RX ORDER — FAMOTIDINE 10 MG/ML
20 INJECTION, SOLUTION INTRAVENOUS AS NEEDED
Status: DISCONTINUED | OUTPATIENT
Start: 2023-04-28 | End: 2023-04-28 | Stop reason: HOSPADM

## 2023-04-28 RX ORDER — ATROPINE SULFATE 1 MG/ML
0.25 INJECTION, SOLUTION INTRAMUSCULAR; INTRAVENOUS; SUBCUTANEOUS
Status: DISCONTINUED | OUTPATIENT
Start: 2023-04-28 | End: 2023-04-28 | Stop reason: HOSPADM

## 2023-04-28 RX ORDER — HEPARIN SODIUM (PORCINE) LOCK FLUSH IV SOLN 100 UNIT/ML 100 UNIT/ML
500 SOLUTION INTRAVENOUS AS NEEDED
OUTPATIENT
Start: 2023-04-28

## 2023-04-28 RX ORDER — ATROPINE SULFATE 1 MG/ML
0.25 INJECTION, SOLUTION INTRAMUSCULAR; INTRAVENOUS; SUBCUTANEOUS
Status: CANCELLED | OUTPATIENT
Start: 2023-04-28

## 2023-04-28 RX ORDER — FAMOTIDINE 10 MG/ML
20 INJECTION, SOLUTION INTRAVENOUS ONCE
Status: COMPLETED | OUTPATIENT
Start: 2023-04-28 | End: 2023-04-28

## 2023-04-28 RX ORDER — ACETAMINOPHEN 325 MG/1
650 TABLET ORAL ONCE
Status: CANCELLED | OUTPATIENT
Start: 2023-04-28

## 2023-04-28 RX ORDER — PALONOSETRON 0.05 MG/ML
0.25 INJECTION, SOLUTION INTRAVENOUS ONCE
Status: CANCELLED | OUTPATIENT
Start: 2023-04-28

## 2023-04-28 RX ORDER — SODIUM CHLORIDE 9 MG/ML
250 INJECTION, SOLUTION INTRAVENOUS ONCE
Status: COMPLETED | OUTPATIENT
Start: 2023-04-28 | End: 2023-04-28

## 2023-04-28 RX ORDER — FAMOTIDINE 10 MG/ML
20 INJECTION, SOLUTION INTRAVENOUS AS NEEDED
Status: CANCELLED | OUTPATIENT
Start: 2023-04-28

## 2023-04-28 RX ORDER — SODIUM CHLORIDE 9 MG/ML
250 INJECTION, SOLUTION INTRAVENOUS ONCE
Status: CANCELLED | OUTPATIENT
Start: 2023-04-28

## 2023-04-28 RX ORDER — ACETAMINOPHEN 325 MG/1
650 TABLET ORAL ONCE
Status: COMPLETED | OUTPATIENT
Start: 2023-04-28 | End: 2023-04-28

## 2023-04-28 RX ORDER — SODIUM CHLORIDE 0.9 % (FLUSH) 0.9 %
10 SYRINGE (ML) INJECTION AS NEEDED
OUTPATIENT
Start: 2023-04-28

## 2023-04-28 RX ORDER — PALONOSETRON 0.05 MG/ML
0.25 INJECTION, SOLUTION INTRAVENOUS ONCE
Status: COMPLETED | OUTPATIENT
Start: 2023-04-28 | End: 2023-04-28

## 2023-04-28 RX ORDER — METHYLPREDNISOLONE SODIUM SUCCINATE 125 MG/2ML
125 INJECTION, POWDER, LYOPHILIZED, FOR SOLUTION INTRAMUSCULAR; INTRAVENOUS AS NEEDED
Status: DISCONTINUED | OUTPATIENT
Start: 2023-04-28 | End: 2023-04-28 | Stop reason: HOSPADM

## 2023-04-28 RX ORDER — HEPARIN SODIUM (PORCINE) LOCK FLUSH IV SOLN 100 UNIT/ML 100 UNIT/ML
500 SOLUTION INTRAVENOUS AS NEEDED
Status: DISCONTINUED | OUTPATIENT
Start: 2023-04-28 | End: 2023-04-28 | Stop reason: HOSPADM

## 2023-04-28 RX ORDER — SODIUM CHLORIDE 0.9 % (FLUSH) 0.9 %
10 SYRINGE (ML) INJECTION AS NEEDED
Status: DISCONTINUED | OUTPATIENT
Start: 2023-04-28 | End: 2023-04-28 | Stop reason: HOSPADM

## 2023-04-28 RX ADMIN — ACETAMINOPHEN 650 MG: 325 TABLET ORAL at 08:34

## 2023-04-28 RX ADMIN — SODIUM CHLORIDE 250 ML: 9 INJECTION, SOLUTION INTRAVENOUS at 08:32

## 2023-04-28 RX ADMIN — SACITUZUMAB GOVITECAN 460 MG: 180 POWDER, FOR SOLUTION INTRAVENOUS at 10:42

## 2023-04-28 RX ADMIN — Medication 10 ML: at 12:04

## 2023-04-28 RX ADMIN — OLANZAPINE 5 MG: 5 TABLET, FILM COATED ORAL at 08:34

## 2023-04-28 RX ADMIN — FOSAPREPITANT 100 ML: 150 INJECTION, POWDER, LYOPHILIZED, FOR SOLUTION INTRAVENOUS at 08:48

## 2023-04-28 RX ADMIN — PALONOSETRON 0.25 MG: 0.05 INJECTION, SOLUTION INTRAVENOUS at 08:33

## 2023-04-28 RX ADMIN — FAMOTIDINE 20 MG: 10 INJECTION INTRAVENOUS at 08:58

## 2023-04-28 RX ADMIN — DEXAMETHASONE SODIUM PHOSPHATE 12 MG: 10 INJECTION, SOLUTION INTRAMUSCULAR; INTRAVENOUS at 10:07

## 2023-04-28 RX ADMIN — DIPHENHYDRAMINE HYDROCHLORIDE 25 MG: 50 INJECTION, SOLUTION INTRAMUSCULAR; INTRAVENOUS at 09:31

## 2023-04-28 RX ADMIN — HEPARIN 500 UNITS: 100 SYRINGE at 12:04

## 2023-05-12 ENCOUNTER — INFUSION (OUTPATIENT)
Dept: ONCOLOGY | Facility: HOSPITAL | Age: 63
End: 2023-05-12
Payer: OTHER GOVERNMENT

## 2023-05-12 ENCOUNTER — OFFICE VISIT (OUTPATIENT)
Dept: ONCOLOGY | Facility: CLINIC | Age: 63
End: 2023-05-12
Payer: OTHER GOVERNMENT

## 2023-05-12 VITALS
TEMPERATURE: 96.6 F | SYSTOLIC BLOOD PRESSURE: 119 MMHG | RESPIRATION RATE: 18 BRPM | OXYGEN SATURATION: 92 % | BODY MASS INDEX: 22.85 KG/M2 | WEIGHT: 129 LBS | HEART RATE: 92 BPM | DIASTOLIC BLOOD PRESSURE: 48 MMHG

## 2023-05-12 DIAGNOSIS — C50.919 MALIGNANT NEOPLASM OF FEMALE BREAST, UNSPECIFIED ESTROGEN RECEPTOR STATUS, UNSPECIFIED LATERALITY, UNSPECIFIED SITE OF BREAST: Primary | ICD-10-CM

## 2023-05-12 DIAGNOSIS — G62.0 CHEMOTHERAPY-INDUCED PERIPHERAL NEUROPATHY: ICD-10-CM

## 2023-05-12 DIAGNOSIS — T45.1X5A CHEMOTHERAPY-INDUCED PERIPHERAL NEUROPATHY: ICD-10-CM

## 2023-05-12 DIAGNOSIS — I82.A12 ACUTE DEEP VEIN THROMBOSIS (DVT) OF AXILLARY VEIN OF LEFT UPPER EXTREMITY: ICD-10-CM

## 2023-05-12 DIAGNOSIS — C50.919 MALIGNANT NEOPLASM OF FEMALE BREAST, UNSPECIFIED ESTROGEN RECEPTOR STATUS, UNSPECIFIED LATERALITY, UNSPECIFIED SITE OF BREAST: ICD-10-CM

## 2023-05-12 DIAGNOSIS — K12.31 MUCOSITIS DUE TO ANTINEOPLASTIC THERAPY: ICD-10-CM

## 2023-05-12 LAB
ALBUMIN SERPL-MCNC: 3.7 G/DL (ref 3.5–5.2)
ALBUMIN/GLOB SERPL: 1.4 G/DL
ALP SERPL-CCNC: 87 U/L (ref 39–117)
ALT SERPL W P-5'-P-CCNC: 10 U/L (ref 1–33)
ANION GAP SERPL CALCULATED.3IONS-SCNC: 9 MMOL/L (ref 5–15)
AST SERPL-CCNC: 16 U/L (ref 1–32)
BASOPHILS # BLD AUTO: 0.02 10*3/MM3 (ref 0–0.2)
BASOPHILS NFR BLD AUTO: 0.4 % (ref 0–1.5)
BILIRUB SERPL-MCNC: 0.4 MG/DL (ref 0–1.2)
BUN SERPL-MCNC: 9 MG/DL (ref 8–23)
BUN/CREAT SERPL: 17 (ref 7–25)
CALCIUM SPEC-SCNC: 8.8 MG/DL (ref 8.6–10.5)
CHLORIDE SERPL-SCNC: 106 MMOL/L (ref 98–107)
CO2 SERPL-SCNC: 26 MMOL/L (ref 22–29)
CREAT SERPL-MCNC: 0.53 MG/DL (ref 0.57–1)
DEPRECATED RDW RBC AUTO: 51.4 FL (ref 37–54)
EGFRCR SERPLBLD CKD-EPI 2021: 102.8 ML/MIN/1.73
EOSINOPHIL # BLD AUTO: 0.06 10*3/MM3 (ref 0–0.4)
EOSINOPHIL NFR BLD AUTO: 1.3 % (ref 0.3–6.2)
ERYTHROCYTE [DISTWIDTH] IN BLOOD BY AUTOMATED COUNT: 14.7 % (ref 12.3–15.4)
GLOBULIN UR ELPH-MCNC: 2.6 GM/DL
GLUCOSE SERPL-MCNC: 99 MG/DL (ref 65–99)
HCT VFR BLD AUTO: 29.9 % (ref 34–46.6)
HGB BLD-MCNC: 9.7 G/DL (ref 12–15.9)
HOLD SPECIMEN: NORMAL
IMM GRANULOCYTES # BLD AUTO: 0.02 10*3/MM3 (ref 0–0.05)
IMM GRANULOCYTES NFR BLD AUTO: 0.4 % (ref 0–0.5)
LYMPHOCYTES # BLD AUTO: 0.71 10*3/MM3 (ref 0.7–3.1)
LYMPHOCYTES NFR BLD AUTO: 15.2 % (ref 19.6–45.3)
MCH RBC QN AUTO: 31.2 PG (ref 26.6–33)
MCHC RBC AUTO-ENTMCNC: 32.4 G/DL (ref 31.5–35.7)
MCV RBC AUTO: 96.1 FL (ref 79–97)
MONOCYTES # BLD AUTO: 0.5 10*3/MM3 (ref 0.1–0.9)
MONOCYTES NFR BLD AUTO: 10.7 % (ref 5–12)
NEUTROPHILS NFR BLD AUTO: 3.35 10*3/MM3 (ref 1.7–7)
NEUTROPHILS NFR BLD AUTO: 72 % (ref 42.7–76)
NRBC BLD AUTO-RTO: 0 /100 WBC (ref 0–0.2)
PLATELET # BLD AUTO: 150 10*3/MM3 (ref 140–450)
PMV BLD AUTO: 9.7 FL (ref 6–12)
POTASSIUM SERPL-SCNC: 3.9 MMOL/L (ref 3.5–5.2)
PROT SERPL-MCNC: 6.3 G/DL (ref 6–8.5)
RBC # BLD AUTO: 3.11 10*6/MM3 (ref 3.77–5.28)
SODIUM SERPL-SCNC: 141 MMOL/L (ref 136–145)
WBC NRBC COR # BLD: 4.66 10*3/MM3 (ref 3.4–10.8)

## 2023-05-12 PROCEDURE — 25010000002 SACITUZUMAB GOVITECAN-HZIY 180 MG RECONSTITUTED SOLUTION 1 EACH VIAL: Performed by: INTERNAL MEDICINE

## 2023-05-12 PROCEDURE — 25010000002 DEXAMETHASONE SODIUM PHOSPHATE 100 MG/10ML SOLUTION: Performed by: INTERNAL MEDICINE

## 2023-05-12 PROCEDURE — 25010000002 HEPARIN LOCK FLUSH PER 10 UNITS: Performed by: INTERNAL MEDICINE

## 2023-05-12 PROCEDURE — 25010000002 DIPHENHYDRAMINE PER 50 MG: Performed by: INTERNAL MEDICINE

## 2023-05-12 PROCEDURE — 25010000002 PALONOSETRON PER 25 MCG: Performed by: INTERNAL MEDICINE

## 2023-05-12 PROCEDURE — 80053 COMPREHEN METABOLIC PANEL: CPT

## 2023-05-12 PROCEDURE — 36415 COLL VENOUS BLD VENIPUNCTURE: CPT

## 2023-05-12 PROCEDURE — 25010000002 FOSAPREPITANT PER 1 MG: Performed by: INTERNAL MEDICINE

## 2023-05-12 PROCEDURE — 85025 COMPLETE CBC W/AUTO DIFF WBC: CPT

## 2023-05-12 RX ORDER — OLANZAPINE 5 MG/1
5 TABLET ORAL ONCE
OUTPATIENT
Start: 2023-05-19 | End: 2023-05-19

## 2023-05-12 RX ORDER — SODIUM CHLORIDE 0.9 % (FLUSH) 0.9 %
10 SYRINGE (ML) INJECTION AS NEEDED
OUTPATIENT
Start: 2023-05-12

## 2023-05-12 RX ORDER — OLANZAPINE 5 MG/1
5 TABLET ORAL ONCE
Status: COMPLETED | OUTPATIENT
Start: 2023-05-12 | End: 2023-05-12

## 2023-05-12 RX ORDER — ACETAMINOPHEN 325 MG/1
650 TABLET ORAL ONCE
Status: COMPLETED | OUTPATIENT
Start: 2023-05-12 | End: 2023-05-12

## 2023-05-12 RX ORDER — DIPHENHYDRAMINE HYDROCHLORIDE 50 MG/ML
50 INJECTION INTRAMUSCULAR; INTRAVENOUS AS NEEDED
OUTPATIENT
Start: 2023-05-19

## 2023-05-12 RX ORDER — FAMOTIDINE 10 MG/ML
20 INJECTION, SOLUTION INTRAVENOUS ONCE
OUTPATIENT
Start: 2023-05-19

## 2023-05-12 RX ORDER — ATROPINE SULFATE 1 MG/ML
0.25 INJECTION, SOLUTION INTRAMUSCULAR; INTRAVENOUS; SUBCUTANEOUS
OUTPATIENT
Start: 2023-05-19

## 2023-05-12 RX ORDER — SODIUM CHLORIDE 9 MG/ML
250 INJECTION, SOLUTION INTRAVENOUS ONCE
Status: CANCELLED | OUTPATIENT
Start: 2023-05-12

## 2023-05-12 RX ORDER — FAMOTIDINE 10 MG/ML
20 INJECTION, SOLUTION INTRAVENOUS AS NEEDED
Status: DISCONTINUED | OUTPATIENT
Start: 2023-05-12 | End: 2023-05-12 | Stop reason: HOSPADM

## 2023-05-12 RX ORDER — OLANZAPINE 5 MG/1
5 TABLET ORAL ONCE
Status: CANCELLED | OUTPATIENT
Start: 2023-05-12 | End: 2023-05-12

## 2023-05-12 RX ORDER — SODIUM CHLORIDE 0.9 % (FLUSH) 0.9 %
10 SYRINGE (ML) INJECTION AS NEEDED
Status: DISCONTINUED | OUTPATIENT
Start: 2023-05-12 | End: 2023-05-12 | Stop reason: HOSPADM

## 2023-05-12 RX ORDER — ATROPINE SULFATE 1 MG/ML
0.25 INJECTION, SOLUTION INTRAMUSCULAR; INTRAVENOUS; SUBCUTANEOUS
Status: CANCELLED | OUTPATIENT
Start: 2023-05-12

## 2023-05-12 RX ORDER — DIPHENHYDRAMINE HYDROCHLORIDE 50 MG/ML
50 INJECTION INTRAMUSCULAR; INTRAVENOUS AS NEEDED
Status: CANCELLED | OUTPATIENT
Start: 2023-05-12

## 2023-05-12 RX ORDER — DIPHENHYDRAMINE HYDROCHLORIDE 50 MG/ML
50 INJECTION INTRAMUSCULAR; INTRAVENOUS AS NEEDED
Status: DISCONTINUED | OUTPATIENT
Start: 2023-05-12 | End: 2023-05-12 | Stop reason: HOSPADM

## 2023-05-12 RX ORDER — FAMOTIDINE 10 MG/ML
20 INJECTION, SOLUTION INTRAVENOUS AS NEEDED
Status: CANCELLED | OUTPATIENT
Start: 2023-05-12

## 2023-05-12 RX ORDER — FAMOTIDINE 10 MG/ML
20 INJECTION, SOLUTION INTRAVENOUS ONCE
Status: CANCELLED | OUTPATIENT
Start: 2023-05-12

## 2023-05-12 RX ORDER — HEPARIN SODIUM (PORCINE) LOCK FLUSH IV SOLN 100 UNIT/ML 100 UNIT/ML
500 SOLUTION INTRAVENOUS AS NEEDED
OUTPATIENT
Start: 2023-05-12

## 2023-05-12 RX ORDER — SODIUM CHLORIDE 9 MG/ML
250 INJECTION, SOLUTION INTRAVENOUS ONCE
Status: COMPLETED | OUTPATIENT
Start: 2023-05-12 | End: 2023-05-12

## 2023-05-12 RX ORDER — FAMOTIDINE 10 MG/ML
20 INJECTION, SOLUTION INTRAVENOUS ONCE
Status: COMPLETED | OUTPATIENT
Start: 2023-05-12 | End: 2023-05-12

## 2023-05-12 RX ORDER — ACETAMINOPHEN 325 MG/1
650 TABLET ORAL ONCE
OUTPATIENT
Start: 2023-05-19

## 2023-05-12 RX ORDER — ACETAMINOPHEN 325 MG/1
650 TABLET ORAL ONCE
Status: CANCELLED | OUTPATIENT
Start: 2023-05-12

## 2023-05-12 RX ORDER — SODIUM CHLORIDE 9 MG/ML
250 INJECTION, SOLUTION INTRAVENOUS ONCE
OUTPATIENT
Start: 2023-05-19

## 2023-05-12 RX ORDER — PALONOSETRON 0.05 MG/ML
0.25 INJECTION, SOLUTION INTRAVENOUS ONCE
Status: COMPLETED | OUTPATIENT
Start: 2023-05-12 | End: 2023-05-12

## 2023-05-12 RX ORDER — FAMOTIDINE 10 MG/ML
20 INJECTION, SOLUTION INTRAVENOUS AS NEEDED
OUTPATIENT
Start: 2023-05-19

## 2023-05-12 RX ORDER — ATROPINE SULFATE 1 MG/ML
0.25 INJECTION, SOLUTION INTRAMUSCULAR; INTRAVENOUS; SUBCUTANEOUS
Status: DISCONTINUED | OUTPATIENT
Start: 2023-05-12 | End: 2023-05-12 | Stop reason: HOSPADM

## 2023-05-12 RX ORDER — HEPARIN SODIUM (PORCINE) LOCK FLUSH IV SOLN 100 UNIT/ML 100 UNIT/ML
500 SOLUTION INTRAVENOUS AS NEEDED
Status: DISCONTINUED | OUTPATIENT
Start: 2023-05-12 | End: 2023-05-12 | Stop reason: HOSPADM

## 2023-05-12 RX ORDER — PALONOSETRON 0.05 MG/ML
0.25 INJECTION, SOLUTION INTRAVENOUS ONCE
OUTPATIENT
Start: 2023-05-19

## 2023-05-12 RX ORDER — PALONOSETRON 0.05 MG/ML
0.25 INJECTION, SOLUTION INTRAVENOUS ONCE
Status: CANCELLED | OUTPATIENT
Start: 2023-05-12

## 2023-05-12 RX ADMIN — DIPHENHYDRAMINE HYDROCHLORIDE 25 MG: 50 INJECTION, SOLUTION INTRAMUSCULAR; INTRAVENOUS at 10:19

## 2023-05-12 RX ADMIN — ACETAMINOPHEN 650 MG: 325 TABLET, FILM COATED ORAL at 10:03

## 2023-05-12 RX ADMIN — SODIUM CHLORIDE 250 ML: 9 INJECTION, SOLUTION INTRAVENOUS at 09:18

## 2023-05-12 RX ADMIN — OLANZAPINE 5 MG: 5 TABLET, FILM COATED ORAL at 09:18

## 2023-05-12 RX ADMIN — FAMOTIDINE 20 MG: 10 INJECTION INTRAVENOUS at 10:04

## 2023-05-12 RX ADMIN — DEXAMETHASONE SODIUM PHOSPHATE 12 MG: 10 INJECTION, SOLUTION INTRAMUSCULAR; INTRAVENOUS at 10:46

## 2023-05-12 RX ADMIN — SACITUZUMAB GOVITECAN 460 MG: 180 POWDER, FOR SOLUTION INTRAVENOUS at 11:21

## 2023-05-12 RX ADMIN — HEPARIN 500 UNITS: 100 SYRINGE at 12:40

## 2023-05-12 RX ADMIN — PALONOSETRON 0.25 MG: 0.05 INJECTION, SOLUTION INTRAVENOUS at 09:18

## 2023-05-12 RX ADMIN — FOSAPREPITANT 100 ML: 150 INJECTION, POWDER, LYOPHILIZED, FOR SOLUTION INTRAVENOUS at 09:23

## 2023-05-12 NOTE — PROGRESS NOTES
Subjective     Radha Duarte was seen in follow-up for metastatic breast cancer.  She is overall stable.  Is feeling well.  Intermittent fatigue but stable.  No new aches or pains.  Right upper extremity swelling is stable.  No new bleeding concerns.    Past Medical History, Past Surgical History, Social History, Family History have been reviewed and are without significant changes except as mentioned.        Medications:  The current medication list was reviewed in the EMR    ALLERGIES:    Allergies   Allergen Reactions   • Percocet [Oxycodone-Acetaminophen] Nausea And Vomiting       Objective      Vitals:    05/12/23 0813   BP: 119/48   Pulse: 92   Resp: 18   Temp: 96.6 °F (35.9 °C)   SpO2: 92%             4/21/2023     9:40 AM   Current Status   ECOG score 0       Physical Exam  Vitals and nursing note reviewed.   Constitutional:       Appearance: Normal appearance.   Musculoskeletal:      Comments: RUE swelling stable .    Neurological:      General: No focal deficit present.      Mental Status: She is alert and oriented to person, place, and time. Mental status is at baseline.   Psychiatric:         Mood and Affect: Mood normal.         Behavior: Behavior normal.         Thought Content: Thought content normal.               RECENT LABS:Independently reviewed and summarized  Hematology WBC   Date Value Ref Range Status   04/28/2023 4.86 3.40 - 10.80 10*3/mm3 Final     RBC   Date Value Ref Range Status   04/28/2023 3.39 (L) 3.77 - 5.28 10*6/mm3 Final     Hemoglobin   Date Value Ref Range Status   04/28/2023 10.7 (L) 12.0 - 15.9 g/dL Final     Hematocrit   Date Value Ref Range Status   04/28/2023 32.7 (L) 34.0 - 46.6 % Final     Platelets   Date Value Ref Range Status   04/28/2023 160 140 - 450 10*3/mm3 Final     WBC   Date Value Ref Range Status   05/12/2023 4.66 3.40 - 10.80 10*3/mm3 Final     RBC   Date Value Ref Range Status   05/12/2023 3.11 (L) 3.77 - 5.28 10*6/mm3 Final     Hemoglobin   Date  Value Ref Range Status   05/12/2023 9.7 (L) 12.0 - 15.9 g/dL Final     Hematocrit   Date Value Ref Range Status   05/12/2023 29.9 (L) 34.0 - 46.6 % Final     MCV   Date Value Ref Range Status   05/12/2023 96.1 79.0 - 97.0 fL Final     MCH   Date Value Ref Range Status   05/12/2023 31.2 26.6 - 33.0 pg Final     MCHC   Date Value Ref Range Status   05/12/2023 32.4 31.5 - 35.7 g/dL Final     RDW   Date Value Ref Range Status   05/12/2023 14.7 12.3 - 15.4 % Final     RDW-SD   Date Value Ref Range Status   05/12/2023 51.4 37.0 - 54.0 fl Final     MPV   Date Value Ref Range Status   05/12/2023 9.7 6.0 - 12.0 fL Final     Platelets   Date Value Ref Range Status   05/12/2023 150 140 - 450 10*3/mm3 Final     Neutrophil %   Date Value Ref Range Status   05/12/2023 72.0 42.7 - 76.0 % Final     Lymphocyte %   Date Value Ref Range Status   05/12/2023 15.2 (L) 19.6 - 45.3 % Final     Monocyte %   Date Value Ref Range Status   05/12/2023 10.7 5.0 - 12.0 % Final     Eosinophil %   Date Value Ref Range Status   05/12/2023 1.3 0.3 - 6.2 % Final     Basophil %   Date Value Ref Range Status   05/12/2023 0.4 0.0 - 1.5 % Final     Immature Grans %   Date Value Ref Range Status   05/12/2023 0.4 0.0 - 0.5 % Final     Neutrophils, Absolute   Date Value Ref Range Status   05/12/2023 3.35 1.70 - 7.00 10*3/mm3 Final     Lymphocytes, Absolute   Date Value Ref Range Status   05/12/2023 0.71 0.70 - 3.10 10*3/mm3 Final     Monocytes, Absolute   Date Value Ref Range Status   05/12/2023 0.50 0.10 - 0.90 10*3/mm3 Final     Eosinophils, Absolute   Date Value Ref Range Status   05/12/2023 0.06 0.00 - 0.40 10*3/mm3 Final     Basophils, Absolute   Date Value Ref Range Status   05/12/2023 0.02 0.00 - 0.20 10*3/mm3 Final     Immature Grans, Absolute   Date Value Ref Range Status   05/12/2023 0.02 0.00 - 0.05 10*3/mm3 Final     nRBC   Date Value Ref Range Status   05/12/2023 0.0 0.0 - 0.2 /100 WBC Final     Lab Results   Component Value Date    GLUCOSE 99  05/12/2023    BUN 9 05/12/2023    CREATININE 0.53 (L) 05/12/2023    EGFR 102.8 05/12/2023    BCR 17.0 05/12/2023    K 3.9 05/12/2023    CO2 26.0 05/12/2023    CALCIUM 8.8 05/12/2023    ALBUMIN 3.7 05/12/2023    BILITOT 0.4 05/12/2023    AST 16 05/12/2023    ALT 10 05/12/2023              Radha Duarte reports a pain score of 0.  Given her pain assessment as noted, treatment options were discussed and the following options were decided upon as a follow-up plan to address the patient's pain: continuation of current treatment plan for pain.    Patient screened negative for depression based on a PHQ-9 score of 0 on 5/12/2023.     Advance Care Planning   ACP discussion was declined by the patient. Patient has an advance directive in EMR which is still valid.          Diagnosis:    (1) Metastatic breast cancer with distant LN (mediastinal) metastases   Triple negative   Unable to do perform foundation one due to limited tissue      Current therapy:   Weekly carboplatin/paclitaxel     (4/22/22- 7/7/22)      Discontinue to pain peripheral neuropathy in lower extremities.      Switch to XELODA (7//22/22)      PET scan on 9/19/22 showed progressive disease, mainly in right breast/chest wall/regional nodes.      Started on trodelvy.   D1C1: 10/4/22      Palliative RT to right breast/LN.      D1C2: 11/4/22,D8C2: 11/11/22,D1C3: 12/9/22,D8C3: 12/16/22      PET scan on 12/19/22 showed favorable response to treatment. BETO.      D1C4: 12/30/22,D8C4: 1/6/23,D1C5: 1/20/23,D8C5: 1/27/23,D1C6: 2/10/23,D8C6: 2/17/23    D1C7: 3/3/23, D8C7: 3/10/23, D1C8: 3/24/23     PET scan on 3/27/23 showed No evidence of progression.      D8C8: 3/31/23, D1C9: 4/21/23, D8C9: 4/28/23  D1C10: 5/12/23     (2) Cancer associated pain   (3) Right upper extremity DVT   (4) Mucositis   (5) Unintentional weight loss   (6) Chemotherapy induce peripheral neuropathy     Assessment & Plan     (1) Metastatic breast cancer with distant LN (mediastinal)  metastases     Chronic, stable.  She is currently on Trodelvy.  Tolerating treatment well without any major side effect.  No diarrhea.  No fever or chills.  PET scan on March 27, 2023 showed stable exam without any evidence of recurrence.  Continue Trodelvy.  Labs look stable.  Day 1 cycle 10 was signed on today's visit.  CBC, CMP next week prior to day 8 cycle 10.  I will see her back in 3 weeks with CBC, CMP prior to day 1 cycle 11.    (2) Cancer associated pain     Chronic, stable.  She is on MS Contin 30 mg twice a day.  She will continue this.  New prescription sent.    (3) Right upper extremity DVT     Chronic, stable  She is on Eliquis.  No new concern for bleeding or thrombosis.  Continue close monitoring.    (4) Mucositis     Chronic, stable.  Continue Magic mouthwash as needed.    (5) Unintentional weight loss     Chronic, stable.  Recommend high-calorie high-protein diet.    (6) Chemotherapy induce peripheral neuropathy     Chronic, stable.  She is on Neurontin 400 mg 3 times daily.  She will continue this.    5/12/2023      CC:

## 2023-05-19 ENCOUNTER — INFUSION (OUTPATIENT)
Dept: ONCOLOGY | Facility: HOSPITAL | Age: 63
End: 2023-05-19
Payer: OTHER GOVERNMENT

## 2023-05-19 VITALS
RESPIRATION RATE: 18 BRPM | DIASTOLIC BLOOD PRESSURE: 63 MMHG | HEART RATE: 85 BPM | TEMPERATURE: 96.8 F | SYSTOLIC BLOOD PRESSURE: 140 MMHG

## 2023-05-19 DIAGNOSIS — C50.919 MALIGNANT NEOPLASM OF FEMALE BREAST, UNSPECIFIED ESTROGEN RECEPTOR STATUS, UNSPECIFIED LATERALITY, UNSPECIFIED SITE OF BREAST: Primary | ICD-10-CM

## 2023-05-19 DIAGNOSIS — G89.3 CANCER ASSOCIATED PAIN: ICD-10-CM

## 2023-05-19 LAB
ALBUMIN SERPL-MCNC: 3.6 G/DL (ref 3.5–5.2)
ALBUMIN/GLOB SERPL: 1.5 G/DL
ALP SERPL-CCNC: 87 U/L (ref 39–117)
ALT SERPL W P-5'-P-CCNC: 8 U/L (ref 1–33)
ANION GAP SERPL CALCULATED.3IONS-SCNC: 8 MMOL/L (ref 5–15)
AST SERPL-CCNC: 14 U/L (ref 1–32)
BASOPHILS # BLD AUTO: 0.03 10*3/MM3 (ref 0–0.2)
BASOPHILS NFR BLD AUTO: 0.7 % (ref 0–1.5)
BILIRUB SERPL-MCNC: 0.2 MG/DL (ref 0–1.2)
BUN SERPL-MCNC: 9 MG/DL (ref 8–23)
BUN/CREAT SERPL: 17.6 (ref 7–25)
CALCIUM SPEC-SCNC: 8.5 MG/DL (ref 8.6–10.5)
CHLORIDE SERPL-SCNC: 109 MMOL/L (ref 98–107)
CO2 SERPL-SCNC: 25 MMOL/L (ref 22–29)
CREAT SERPL-MCNC: 0.51 MG/DL (ref 0.57–1)
DEPRECATED RDW RBC AUTO: 50.7 FL (ref 37–54)
EGFRCR SERPLBLD CKD-EPI 2021: 103.7 ML/MIN/1.73
EOSINOPHIL # BLD AUTO: 0.11 10*3/MM3 (ref 0–0.4)
EOSINOPHIL NFR BLD AUTO: 2.5 % (ref 0.3–6.2)
ERYTHROCYTE [DISTWIDTH] IN BLOOD BY AUTOMATED COUNT: 14.5 % (ref 12.3–15.4)
GLOBULIN UR ELPH-MCNC: 2.4 GM/DL
GLUCOSE SERPL-MCNC: 103 MG/DL (ref 65–99)
HCT VFR BLD AUTO: 30.8 % (ref 34–46.6)
HGB BLD-MCNC: 9.9 G/DL (ref 12–15.9)
HOLD SPECIMEN: NORMAL
IMM GRANULOCYTES # BLD AUTO: 0.07 10*3/MM3 (ref 0–0.05)
IMM GRANULOCYTES NFR BLD AUTO: 1.6 % (ref 0–0.5)
LYMPHOCYTES # BLD AUTO: 0.64 10*3/MM3 (ref 0.7–3.1)
LYMPHOCYTES NFR BLD AUTO: 14.5 % (ref 19.6–45.3)
MCH RBC QN AUTO: 30.8 PG (ref 26.6–33)
MCHC RBC AUTO-ENTMCNC: 32.1 G/DL (ref 31.5–35.7)
MCV RBC AUTO: 96 FL (ref 79–97)
MONOCYTES # BLD AUTO: 0.49 10*3/MM3 (ref 0.1–0.9)
MONOCYTES NFR BLD AUTO: 11.1 % (ref 5–12)
NEUTROPHILS NFR BLD AUTO: 3.06 10*3/MM3 (ref 1.7–7)
NEUTROPHILS NFR BLD AUTO: 69.6 % (ref 42.7–76)
NRBC BLD AUTO-RTO: 0 /100 WBC (ref 0–0.2)
PLATELET # BLD AUTO: 167 10*3/MM3 (ref 140–450)
PMV BLD AUTO: 9 FL (ref 6–12)
POTASSIUM SERPL-SCNC: 4.1 MMOL/L (ref 3.5–5.2)
PROT SERPL-MCNC: 6 G/DL (ref 6–8.5)
RBC # BLD AUTO: 3.21 10*6/MM3 (ref 3.77–5.28)
SODIUM SERPL-SCNC: 142 MMOL/L (ref 136–145)
WBC NRBC COR # BLD: 4.4 10*3/MM3 (ref 3.4–10.8)

## 2023-05-19 PROCEDURE — 25010000002 SACITUZUMAB GOVITECAN-HZIY 180 MG RECONSTITUTED SOLUTION 1 EACH VIAL: Performed by: INTERNAL MEDICINE

## 2023-05-19 PROCEDURE — 85025 COMPLETE CBC W/AUTO DIFF WBC: CPT

## 2023-05-19 PROCEDURE — 25010000002 DEXAMETHASONE SODIUM PHOSPHATE 100 MG/10ML SOLUTION: Performed by: INTERNAL MEDICINE

## 2023-05-19 PROCEDURE — 25010000002 FOSAPREPITANT PER 1 MG: Performed by: INTERNAL MEDICINE

## 2023-05-19 PROCEDURE — 36415 COLL VENOUS BLD VENIPUNCTURE: CPT

## 2023-05-19 PROCEDURE — 80053 COMPREHEN METABOLIC PANEL: CPT

## 2023-05-19 PROCEDURE — 25010000002 PALONOSETRON PER 25 MCG: Performed by: INTERNAL MEDICINE

## 2023-05-19 PROCEDURE — 25010000002 DIPHENHYDRAMINE PER 50 MG: Performed by: INTERNAL MEDICINE

## 2023-05-19 RX ORDER — DIPHENHYDRAMINE HYDROCHLORIDE 50 MG/ML
50 INJECTION INTRAMUSCULAR; INTRAVENOUS AS NEEDED
Status: DISCONTINUED | OUTPATIENT
Start: 2023-05-19 | End: 2023-05-19 | Stop reason: HOSPADM

## 2023-05-19 RX ORDER — FAMOTIDINE 10 MG/ML
20 INJECTION, SOLUTION INTRAVENOUS AS NEEDED
Status: DISCONTINUED | OUTPATIENT
Start: 2023-05-19 | End: 2023-05-19 | Stop reason: HOSPADM

## 2023-05-19 RX ORDER — SODIUM CHLORIDE 0.9 % (FLUSH) 0.9 %
10 SYRINGE (ML) INJECTION AS NEEDED
Status: DISCONTINUED | OUTPATIENT
Start: 2023-05-19 | End: 2023-05-19 | Stop reason: HOSPADM

## 2023-05-19 RX ORDER — FAMOTIDINE 10 MG/ML
20 INJECTION, SOLUTION INTRAVENOUS ONCE
Status: COMPLETED | OUTPATIENT
Start: 2023-05-19 | End: 2023-05-19

## 2023-05-19 RX ORDER — HEPARIN SODIUM (PORCINE) LOCK FLUSH IV SOLN 100 UNIT/ML 100 UNIT/ML
500 SOLUTION INTRAVENOUS AS NEEDED
OUTPATIENT
Start: 2023-05-19

## 2023-05-19 RX ORDER — HEPARIN SODIUM (PORCINE) LOCK FLUSH IV SOLN 100 UNIT/ML 100 UNIT/ML
500 SOLUTION INTRAVENOUS AS NEEDED
Status: DISCONTINUED | OUTPATIENT
Start: 2023-05-19 | End: 2023-05-19 | Stop reason: HOSPADM

## 2023-05-19 RX ORDER — ACETAMINOPHEN 325 MG/1
650 TABLET ORAL ONCE
Status: COMPLETED | OUTPATIENT
Start: 2023-05-19 | End: 2023-05-19

## 2023-05-19 RX ORDER — SODIUM CHLORIDE 9 MG/ML
250 INJECTION, SOLUTION INTRAVENOUS ONCE
Status: COMPLETED | OUTPATIENT
Start: 2023-05-19 | End: 2023-05-19

## 2023-05-19 RX ORDER — SODIUM CHLORIDE 0.9 % (FLUSH) 0.9 %
10 SYRINGE (ML) INJECTION AS NEEDED
OUTPATIENT
Start: 2023-05-19

## 2023-05-19 RX ORDER — PALONOSETRON 0.05 MG/ML
0.25 INJECTION, SOLUTION INTRAVENOUS ONCE
Status: COMPLETED | OUTPATIENT
Start: 2023-05-19 | End: 2023-05-19

## 2023-05-19 RX ORDER — SODIUM CHLORIDE 0.9 % (FLUSH) 0.9 %
10 SYRINGE (ML) INJECTION AS NEEDED
Status: CANCELLED | OUTPATIENT
Start: 2023-05-19

## 2023-05-19 RX ORDER — ATROPINE SULFATE 1 MG/ML
0.25 INJECTION, SOLUTION INTRAMUSCULAR; INTRAVENOUS; SUBCUTANEOUS
Status: DISCONTINUED | OUTPATIENT
Start: 2023-05-19 | End: 2023-05-19 | Stop reason: HOSPADM

## 2023-05-19 RX ORDER — HEPARIN SODIUM (PORCINE) LOCK FLUSH IV SOLN 100 UNIT/ML 100 UNIT/ML
500 SOLUTION INTRAVENOUS AS NEEDED
Status: CANCELLED | OUTPATIENT
Start: 2023-05-19

## 2023-05-19 RX ORDER — OLANZAPINE 5 MG/1
5 TABLET ORAL ONCE
Status: COMPLETED | OUTPATIENT
Start: 2023-05-19 | End: 2023-05-19

## 2023-05-19 RX ORDER — MORPHINE SULFATE 15 MG/1
30 TABLET, FILM COATED, EXTENDED RELEASE ORAL 2 TIMES DAILY
Qty: 90 TABLET | Refills: 0 | Status: SHIPPED | OUTPATIENT
Start: 2023-05-19

## 2023-05-19 RX ADMIN — Medication 10 ML: at 12:16

## 2023-05-19 RX ADMIN — FAMOTIDINE 20 MG: 10 INJECTION INTRAVENOUS at 08:48

## 2023-05-19 RX ADMIN — HEPARIN SODIUM (PORCINE) LOCK FLUSH IV SOLN 100 UNIT/ML 500 UNITS: 100 SOLUTION at 12:16

## 2023-05-19 RX ADMIN — PALONOSETRON 0.25 MG: 0.05 INJECTION, SOLUTION INTRAVENOUS at 08:43

## 2023-05-19 RX ADMIN — ACETAMINOPHEN 650 MG: 325 TABLET, FILM COATED ORAL at 08:50

## 2023-05-19 RX ADMIN — SACITUZUMAB GOVITECAN 460 MG: 180 POWDER, FOR SOLUTION INTRAVENOUS at 10:57

## 2023-05-19 RX ADMIN — DIPHENHYDRAMINE HYDROCHLORIDE 25 MG: 50 INJECTION, SOLUTION INTRAMUSCULAR; INTRAVENOUS at 09:56

## 2023-05-19 RX ADMIN — SODIUM CHLORIDE 250 ML: 9 INJECTION, SOLUTION INTRAVENOUS at 08:43

## 2023-05-19 RX ADMIN — OLANZAPINE 5 MG: 5 TABLET, FILM COATED ORAL at 08:43

## 2023-05-19 RX ADMIN — FOSAPREPITANT 100 ML: 150 INJECTION, POWDER, LYOPHILIZED, FOR SOLUTION INTRAVENOUS at 09:11

## 2023-05-19 RX ADMIN — DEXAMETHASONE SODIUM PHOSPHATE 12 MG: 10 INJECTION, SOLUTION INTRAMUSCULAR; INTRAVENOUS at 10:23

## 2023-06-02 ENCOUNTER — INFUSION (OUTPATIENT)
Dept: ONCOLOGY | Facility: HOSPITAL | Age: 63
End: 2023-06-02

## 2023-06-02 ENCOUNTER — OFFICE VISIT (OUTPATIENT)
Dept: ONCOLOGY | Facility: CLINIC | Age: 63
End: 2023-06-02
Payer: OTHER GOVERNMENT

## 2023-06-02 VITALS
RESPIRATION RATE: 18 BRPM | SYSTOLIC BLOOD PRESSURE: 106 MMHG | TEMPERATURE: 97.7 F | HEART RATE: 85 BPM | DIASTOLIC BLOOD PRESSURE: 52 MMHG

## 2023-06-02 VITALS
WEIGHT: 131 LBS | RESPIRATION RATE: 18 BRPM | BODY MASS INDEX: 23.21 KG/M2 | SYSTOLIC BLOOD PRESSURE: 106 MMHG | DIASTOLIC BLOOD PRESSURE: 51 MMHG | HEART RATE: 85 BPM | TEMPERATURE: 97.7 F

## 2023-06-02 DIAGNOSIS — C50.919 MALIGNANT NEOPLASM OF FEMALE BREAST, UNSPECIFIED ESTROGEN RECEPTOR STATUS, UNSPECIFIED LATERALITY, UNSPECIFIED SITE OF BREAST: Primary | ICD-10-CM

## 2023-06-02 DIAGNOSIS — C50.919 MALIGNANT NEOPLASM OF FEMALE BREAST, UNSPECIFIED ESTROGEN RECEPTOR STATUS, UNSPECIFIED LATERALITY, UNSPECIFIED SITE OF BREAST: ICD-10-CM

## 2023-06-02 LAB
ALBUMIN SERPL-MCNC: 3.8 G/DL (ref 3.5–5.2)
ALBUMIN/GLOB SERPL: 1.6 G/DL
ALP SERPL-CCNC: 82 U/L (ref 39–117)
ALT SERPL W P-5'-P-CCNC: 8 U/L (ref 1–33)
ANION GAP SERPL CALCULATED.3IONS-SCNC: 11 MMOL/L (ref 5–15)
AST SERPL-CCNC: 14 U/L (ref 1–32)
BASOPHILS # BLD AUTO: 0.02 10*3/MM3 (ref 0–0.2)
BASOPHILS NFR BLD AUTO: 0.5 % (ref 0–1.5)
BILIRUB SERPL-MCNC: 0.2 MG/DL (ref 0–1.2)
BUN SERPL-MCNC: 7 MG/DL (ref 8–23)
BUN/CREAT SERPL: 13.2 (ref 7–25)
CALCIUM SPEC-SCNC: 8.9 MG/DL (ref 8.6–10.5)
CHLORIDE SERPL-SCNC: 107 MMOL/L (ref 98–107)
CO2 SERPL-SCNC: 24 MMOL/L (ref 22–29)
CREAT SERPL-MCNC: 0.53 MG/DL (ref 0.57–1)
DEPRECATED RDW RBC AUTO: 51.4 FL (ref 37–54)
EGFRCR SERPLBLD CKD-EPI 2021: 102.8 ML/MIN/1.73
EOSINOPHIL # BLD AUTO: 0.05 10*3/MM3 (ref 0–0.4)
EOSINOPHIL NFR BLD AUTO: 1.4 % (ref 0.3–6.2)
ERYTHROCYTE [DISTWIDTH] IN BLOOD BY AUTOMATED COUNT: 14.6 % (ref 12.3–15.4)
GLOBULIN UR ELPH-MCNC: 2.4 GM/DL
GLUCOSE SERPL-MCNC: 98 MG/DL (ref 65–99)
HCT VFR BLD AUTO: 30.4 % (ref 34–46.6)
HGB BLD-MCNC: 9.9 G/DL (ref 12–15.9)
IMM GRANULOCYTES # BLD AUTO: 0.01 10*3/MM3 (ref 0–0.05)
IMM GRANULOCYTES NFR BLD AUTO: 0.3 % (ref 0–0.5)
LYMPHOCYTES # BLD AUTO: 0.57 10*3/MM3 (ref 0.7–3.1)
LYMPHOCYTES NFR BLD AUTO: 15.4 % (ref 19.6–45.3)
MCH RBC QN AUTO: 31.1 PG (ref 26.6–33)
MCHC RBC AUTO-ENTMCNC: 32.6 G/DL (ref 31.5–35.7)
MCV RBC AUTO: 95.6 FL (ref 79–97)
MONOCYTES # BLD AUTO: 0.43 10*3/MM3 (ref 0.1–0.9)
MONOCYTES NFR BLD AUTO: 11.7 % (ref 5–12)
NEUTROPHILS NFR BLD AUTO: 2.61 10*3/MM3 (ref 1.7–7)
NEUTROPHILS NFR BLD AUTO: 70.7 % (ref 42.7–76)
NRBC BLD AUTO-RTO: 0 /100 WBC (ref 0–0.2)
PLATELET # BLD AUTO: 168 10*3/MM3 (ref 140–450)
PMV BLD AUTO: 9.2 FL (ref 6–12)
POTASSIUM SERPL-SCNC: 4 MMOL/L (ref 3.5–5.2)
PROT SERPL-MCNC: 6.2 G/DL (ref 6–8.5)
RBC # BLD AUTO: 3.18 10*6/MM3 (ref 3.77–5.28)
SODIUM SERPL-SCNC: 142 MMOL/L (ref 136–145)
WBC NRBC COR # BLD: 3.69 10*3/MM3 (ref 3.4–10.8)

## 2023-06-02 PROCEDURE — 25010000002 PALONOSETRON PER 25 MCG: Performed by: NURSE PRACTITIONER

## 2023-06-02 PROCEDURE — 85025 COMPLETE CBC W/AUTO DIFF WBC: CPT

## 2023-06-02 PROCEDURE — 25010000002 FOSAPREPITANT PER 1 MG: Performed by: NURSE PRACTITIONER

## 2023-06-02 PROCEDURE — 80053 COMPREHEN METABOLIC PANEL: CPT

## 2023-06-02 PROCEDURE — 25010000002 DIPHENHYDRAMINE PER 50 MG: Performed by: NURSE PRACTITIONER

## 2023-06-02 PROCEDURE — 25010000002 HEPARIN LOCK FLUSH PER 10 UNITS: Performed by: NURSE PRACTITIONER

## 2023-06-02 PROCEDURE — 25010000002 DEXAMETHASONE SODIUM PHOSPHATE 100 MG/10ML SOLUTION: Performed by: NURSE PRACTITIONER

## 2023-06-02 PROCEDURE — 25010000002 SACITUZUMAB GOVITECAN-HZIY 180 MG RECONSTITUTED SOLUTION 1 EACH VIAL: Performed by: NURSE PRACTITIONER

## 2023-06-02 RX ORDER — PALONOSETRON 0.05 MG/ML
0.25 INJECTION, SOLUTION INTRAVENOUS ONCE
Status: COMPLETED | OUTPATIENT
Start: 2023-06-02 | End: 2023-06-02

## 2023-06-02 RX ORDER — PALONOSETRON 0.05 MG/ML
0.25 INJECTION, SOLUTION INTRAVENOUS ONCE
Status: CANCELLED | OUTPATIENT
Start: 2023-06-02

## 2023-06-02 RX ORDER — OLANZAPINE 5 MG/1
5 TABLET ORAL ONCE
Status: CANCELLED | OUTPATIENT
Start: 2023-06-02 | End: 2023-06-02

## 2023-06-02 RX ORDER — OLANZAPINE 5 MG/1
5 TABLET ORAL ONCE
Status: COMPLETED | OUTPATIENT
Start: 2023-06-02 | End: 2023-06-02

## 2023-06-02 RX ORDER — FAMOTIDINE 10 MG/ML
20 INJECTION, SOLUTION INTRAVENOUS AS NEEDED
Status: DISCONTINUED | OUTPATIENT
Start: 2023-06-02 | End: 2023-06-02 | Stop reason: HOSPADM

## 2023-06-02 RX ORDER — FAMOTIDINE 10 MG/ML
20 INJECTION, SOLUTION INTRAVENOUS AS NEEDED
Status: CANCELLED | OUTPATIENT
Start: 2023-06-02

## 2023-06-02 RX ORDER — SODIUM CHLORIDE 9 MG/ML
250 INJECTION, SOLUTION INTRAVENOUS ONCE
Status: CANCELLED | OUTPATIENT
Start: 2023-06-02

## 2023-06-02 RX ORDER — SODIUM CHLORIDE 0.9 % (FLUSH) 0.9 %
10 SYRINGE (ML) INJECTION AS NEEDED
Status: DISCONTINUED | OUTPATIENT
Start: 2023-06-02 | End: 2023-06-02 | Stop reason: HOSPADM

## 2023-06-02 RX ORDER — SODIUM CHLORIDE 9 MG/ML
250 INJECTION, SOLUTION INTRAVENOUS ONCE
Status: COMPLETED | OUTPATIENT
Start: 2023-06-02 | End: 2023-06-02

## 2023-06-02 RX ORDER — FAMOTIDINE 10 MG/ML
20 INJECTION, SOLUTION INTRAVENOUS ONCE
Status: COMPLETED | OUTPATIENT
Start: 2023-06-02 | End: 2023-06-02

## 2023-06-02 RX ORDER — ACETAMINOPHEN 325 MG/1
650 TABLET ORAL ONCE
Status: COMPLETED | OUTPATIENT
Start: 2023-06-02 | End: 2023-06-02

## 2023-06-02 RX ORDER — DIPHENHYDRAMINE HYDROCHLORIDE 50 MG/ML
50 INJECTION INTRAMUSCULAR; INTRAVENOUS AS NEEDED
Status: DISCONTINUED | OUTPATIENT
Start: 2023-06-02 | End: 2023-06-02 | Stop reason: HOSPADM

## 2023-06-02 RX ORDER — DIPHENHYDRAMINE HYDROCHLORIDE 50 MG/ML
50 INJECTION INTRAMUSCULAR; INTRAVENOUS AS NEEDED
Status: CANCELLED | OUTPATIENT
Start: 2023-06-02

## 2023-06-02 RX ORDER — FAMOTIDINE 10 MG/ML
20 INJECTION, SOLUTION INTRAVENOUS ONCE
Status: CANCELLED | OUTPATIENT
Start: 2023-06-02

## 2023-06-02 RX ORDER — HEPARIN SODIUM (PORCINE) LOCK FLUSH IV SOLN 100 UNIT/ML 100 UNIT/ML
500 SOLUTION INTRAVENOUS AS NEEDED
Status: DISCONTINUED | OUTPATIENT
Start: 2023-06-02 | End: 2023-06-02 | Stop reason: HOSPADM

## 2023-06-02 RX ORDER — ACETAMINOPHEN 325 MG/1
650 TABLET ORAL ONCE
Status: CANCELLED | OUTPATIENT
Start: 2023-06-02

## 2023-06-02 RX ORDER — ATROPINE SULFATE 1 MG/ML
0.25 INJECTION, SOLUTION INTRAMUSCULAR; INTRAVENOUS; SUBCUTANEOUS
Status: CANCELLED | OUTPATIENT
Start: 2023-06-02

## 2023-06-02 RX ORDER — ATROPINE SULFATE 1 MG/ML
0.25 INJECTION, SOLUTION INTRAMUSCULAR; INTRAVENOUS; SUBCUTANEOUS
Status: DISCONTINUED | OUTPATIENT
Start: 2023-06-02 | End: 2023-06-02 | Stop reason: HOSPADM

## 2023-06-02 RX ADMIN — FOSAPREPITANT 100 ML: 150 INJECTION, POWDER, LYOPHILIZED, FOR SOLUTION INTRAVENOUS at 10:33

## 2023-06-02 RX ADMIN — Medication 10 ML: at 13:11

## 2023-06-02 RX ADMIN — FAMOTIDINE 20 MG: 10 INJECTION INTRAVENOUS at 11:07

## 2023-06-02 RX ADMIN — SACITUZUMAB GOVITECAN 460 MG: 180 POWDER, FOR SOLUTION INTRAVENOUS at 12:02

## 2023-06-02 RX ADMIN — DIPHENHYDRAMINE HYDROCHLORIDE 25 MG: 50 INJECTION, SOLUTION INTRAMUSCULAR; INTRAVENOUS at 11:13

## 2023-06-02 RX ADMIN — ACETAMINOPHEN 650 MG: 325 TABLET, FILM COATED ORAL at 11:07

## 2023-06-02 RX ADMIN — PALONOSETRON 0.25 MG: 0.05 INJECTION, SOLUTION INTRAVENOUS at 10:24

## 2023-06-02 RX ADMIN — DEXAMETHASONE SODIUM PHOSPHATE 12 MG: 10 INJECTION, SOLUTION INTRAMUSCULAR; INTRAVENOUS at 11:32

## 2023-06-02 RX ADMIN — SODIUM CHLORIDE 250 ML: 9 INJECTION, SOLUTION INTRAVENOUS at 10:24

## 2023-06-02 RX ADMIN — OLANZAPINE 5 MG: 5 TABLET, FILM COATED ORAL at 10:24

## 2023-06-02 RX ADMIN — HEPARIN 500 UNITS: 100 SYRINGE at 13:12

## 2023-06-08 ENCOUNTER — HOSPITAL ENCOUNTER (EMERGENCY)
Facility: HOSPITAL | Age: 63
Discharge: HOME | End: 2023-06-08
Attending: EMERGENCY MEDICINE
Payer: MEDICARE

## 2023-06-08 ENCOUNTER — APPOINTMENT (OUTPATIENT)
Dept: GENERAL RADIOLOGY | Facility: HOSPITAL | Age: 63
End: 2023-06-08
Payer: MEDICARE

## 2023-06-08 ENCOUNTER — TELEPHONE (OUTPATIENT)
Dept: ONCOLOGY | Facility: CLINIC | Age: 63
End: 2023-06-08
Payer: MEDICARE

## 2023-06-08 VITALS
RESPIRATION RATE: 20 BRPM | SYSTOLIC BLOOD PRESSURE: 102 MMHG | BODY MASS INDEX: 24.73 KG/M2 | WEIGHT: 131 LBS | DIASTOLIC BLOOD PRESSURE: 55 MMHG | HEART RATE: 86 BPM | TEMPERATURE: 98.4 F | HEIGHT: 61 IN | OXYGEN SATURATION: 94 %

## 2023-06-08 DIAGNOSIS — S42.292A HUMERAL HEAD FRACTURE, LEFT, CLOSED, INITIAL ENCOUNTER: Primary | ICD-10-CM

## 2023-06-08 PROCEDURE — 73060 X-RAY EXAM OF HUMERUS: CPT

## 2023-06-08 PROCEDURE — 73030 X-RAY EXAM OF SHOULDER: CPT

## 2023-06-08 PROCEDURE — 99283 EMERGENCY DEPT VISIT LOW MDM: CPT

## 2023-06-08 RX ORDER — HYDROCODONE BITARTRATE AND ACETAMINOPHEN 7.5; 325 MG/1; MG/1
1 TABLET ORAL ONCE
Status: COMPLETED | OUTPATIENT
Start: 2023-06-08 | End: 2023-06-08

## 2023-06-08 RX ADMIN — HYDROCODONE BITARTRATE AND ACETAMINOPHEN 1 TABLET: 7.5; 325 TABLET ORAL at 11:22

## 2023-06-08 NOTE — ED NOTES
Pt states she [place] walking at the mall last night and her foot got stuck on the floor and she fell forward landing on her left arm and shoulder. Pt c/o pain with AROM. No obvious deformity noted at this time.

## 2023-06-08 NOTE — TELEPHONE ENCOUNTER
Spoke to pt's daughter in regards to message that was left. Pt's daughter stated pt fell, chipped humerus, pt stating pain at a 10+ and asking if MS Contin can be increased to 60 mg as it was in the past. Please advise.

## 2023-06-08 NOTE — TELEPHONE ENCOUNTER
Called and made pt's daughter aware Dr. Troncoso said he would see pt tomorrow and write new prescription at that time. V/u obtained.

## 2023-06-08 NOTE — ED PROVIDER NOTES
Subjective   History of Present Illness  63 year old female patient presents to ER for complaint of pain to left shoulder after fall yesterday. She was walking at the mall when her foot caught and she lost her balance forward, landing on her left arm. She denies head injury or LOC. She has limited ROM in left arm. Pain is 10/10 presently after home medication of MS 30 this AM. She has a history significant for right breast cancer with lymphedema in left arm. She has chemo scheduled for tomorrow. She smokes less than a pack of cigarettes daily, denies ETOH or illicit drug use. She also has osteopenia.     Review of Systems   Constitutional: Negative.    HENT: Negative.     Eyes: Negative.    Respiratory: Negative.     Cardiovascular: Negative.    Gastrointestinal: Negative.    Endocrine: Negative.    Genitourinary: Negative.    Musculoskeletal:  Positive for arthralgias.   Skin: Negative.    Allergic/Immunologic: Negative.    Neurological: Negative.    Hematological: Negative.    Psychiatric/Behavioral: Negative.       Past Medical History:   Diagnosis Date    Acute deep vein thrombosis (DVT) of brachial vein of right upper extremity 03/2022    Anemia     Breast cancer 02/22/2022    Encounter for antineoplastic chemotherapy     2 cycles Taxol/Carbo before    Headache     Hyperlipidemia     Low back pain 1986    Neck. back, & lower back has grown wrost brayan the years    Osteopenia August 2018    Osteoporosis     neck, lower back    Urinary tract infection 1976    Was on a medicine prescription said i indefinitely. I discovered drinking tea was the problem.    Varicose veins of right lower extremity     hx blood clot in right lower calf - age 21       Allergies   Allergen Reactions    Percocet [Oxycodone-Acetaminophen] Nausea And Vomiting       Past Surgical History:   Procedure Laterality Date    BREAST BIOPSY Right 02/22/2022    COLONOSCOPY N/A 03/09/2020    Procedure: COLONOSCOPY;  Surgeon: Bishop Rodriguez DO;   "Location: [facility] ENDOSCOPY;  Service: Gastroenterology;  Laterality: N/A;    MEDIPORT INSERTION, SINGLE Left 05/17/2022    SHOULDER MANIPULATION Bilateral 2003    frozen shoulder    TUBAL ABDOMINAL LIGATION  August 7 1998    VARICOSE VEIN SURGERY  1986       Family History   Problem Relation Age of Onset    ALS Mother     Ulcers Mother     Heart disease Father     Alcohol abuse Father     Hyperlipidemia Father     Cancer Brother     Hyperlipidemia Brother     Stroke Brother     COPD Brother     Anuerysm Brother     Hyperlipidemia Brother     Heart disease Paternal Grandmother     Diabetes Paternal Grandmother     Stroke Paternal Aunt     Hypertension Paternal Grandfather     Cancer Maternal Uncle     Hyperlipidemia Brother        Social History     Socioeconomic History    Marital status:     Number of children: 4    Highest education level: GED or equivalent   Tobacco Use    Smoking status: Every Day     Packs/day: 1.00     Years: 40.00     Pack years: 40.00     Types: Cigarettes     Start date: 1/1/1982    Smokeless tobacco: Never    Tobacco comments:     Depends on day-sometimes a pack 3/4-1 pack, stopped 2 years   Vaping Use    Vaping Use: Former    Substances: Nicotine, Flavoring, made throat dry, cough    Devices: Pre-filled or refillable cartridge   Substance and Sexual Activity    Alcohol use: Not Currently    Drug use: Never    Sexual activity: Not Currently     Partners: Male     Birth control/protection: Condom, Injection, Vasectomy, Birth control pill           Objective   /55 (BP Location: Left arm, Patient Position: Sitting)   Pulse 86   Temp 98.4 °F (36.9 °C) (Oral)   Resp 20   Ht 154.9 cm (61\")   Wt 59.4 kg (131 lb)   SpO2 94%   BMI 24.75 kg/m²     Physical Exam  Vitals and nursing note reviewed.   Constitutional:       General: She is not in acute distress.     Appearance: Normal appearance. She is not ill-appearing, toxic-appearing or diaphoretic.   HENT:      Head: " Normocephalic.      Nose: Nose normal.      Mouth/Throat:      Mouth: Mucous membranes are moist.   Eyes:      Pupils: Pupils are equal, round, and reactive to light.   Cardiovascular:      Rate and Rhythm: Normal rate and regular rhythm.      Pulses: Normal pulses.      Heart sounds: Normal heart sounds.   Pulmonary:      Effort: Pulmonary effort is normal.      Breath sounds: Normal breath sounds.   Abdominal:      General: Bowel sounds are normal.      Palpations: Abdomen is soft.   Musculoskeletal:         General: Tenderness present. No deformity. Normal range of motion.      Cervical back: Normal range of motion.      Comments: Tenderness to left upper extremity including shoulder, limited ROM, pulse, motor and sensory intact to left hand. Large ecchymosis noted to medial aspect of left bicep without swelling or deformity.    Skin:     General: Skin is warm and dry.      Capillary Refill: Capillary refill takes less than 2 seconds.   Neurological:      Mental Status: She is alert and oriented to person, place, and time.   Psychiatric:         Mood and Affect: Mood normal.         Behavior: Behavior normal.         Thought Content: Thought content normal.         Judgment: Judgment normal.       Procedures           ED Course  ED Course as of 06/08/23 1149   Thu Jun 08, 2023   1111 Spoke with Dr. Viramontes who is agreeable to review XRs [HS]   1120 Dr. Viramontes after review of images recommends sling and outpatient follow up. Discusses results with patient and daughter at bedside and plan for discharge including follow up with orthopedics. They verbalize understanding and are agreeable with plan.  [HS]      ED Course User Index  [HS] Annette Tobin APRN         XR Shoulder 2+ View Left    Result Date: 6/8/2023  XR SHOULDER LEFT 2 VIEWS HISTORY: Trauma COMPARISON: None FINDINGS: 3 views of the left shoulder include internal rotation, external rotation, and scapular Y views. There is an acute, nondisplaced  fracture through the greater tuberosity of the left humerus. Degenerative changes are seen in the acromioclavicular joint. The visualized left thorax and surrounding soft tissues are within normal limits.     Nondisplaced, acute fracture through the left greater tuberosity.     XR Humerus Left    Result Date: 6/8/2023  FINDINGS: Findings are suspicious for a humeral head fracture involving the greater tubercle. CT of the shoulder is warranted for further evaluation.     Findings are suspicious for a nondisplaced fracture of the greater tubercle of the humeral head. CT is warranted for further evaluation.                                     Medical Decision Making  Problems Addressed:  Humeral head fracture, left, closed, initial encounter: complicated acute illness or injury    Amount and/or Complexity of Data Reviewed  Radiology: ordered.    Risk  Prescription drug management.        Final diagnoses:   Humeral head fracture, left, closed, initial encounter       ED Disposition  ED Disposition       ED Disposition   Discharge    Condition   Stable    Comment   --               Avel Viramontes MD  [address]  [place] [address]  [phone]    Call   ER follow up, call to schedule appointment         Medication List        Changed      docusate sodium 100 MG capsule  Commonly known as: Colace  Take 1 capsule by mouth 2 (Two) Times a Day.  What changed: when to take this                 Annette Tobin, APRN  06/08/23 1143

## 2023-06-08 NOTE — DISCHARGE INSTRUCTIONS
Home to rest. Wear sling while awake. Ice to shoulder off an on for next 24 hours. Continue your home medication. Follow up with orthopedics, call number provided to schedule appointment. Follow up with Dr. Troncoso to update on condition and for management of your medications. Return as needed.

## 2023-06-08 NOTE — TELEPHONE ENCOUNTER
STAFF MESSAGE    Caller Name: Radha Duarte    Who are you requesting to speak with: nurse    What was the call regarding: Pt did go to the ER and has a chip in her ball joint in shoulder. Would like to see Dr. Troncoso prior to treatment tomorrow.     Best call back number: [phone]    What is the best time to reach you: anytime today

## 2023-06-08 NOTE — PROGRESS NOTES
DATE OF VISIT: 6/2/2023      REASON FOR VISIT:  metastatic breast cancer; currently on Trodelvy, due for cycle 11 day 1 today      HISTORY OF PRESENT ILLNESS:     Radha Duarte was seen in follow-up for metastatic breast cancer.  She is overall stable. She states pain is currently controlled on current regimen. Does report more fatigue and tiredness. Denies any bleeding; no fever,chills or recent infections.      Past Medical History, Past Surgical History, Social History, Family History have been reviewed and are without significant changes except as mentioned.    Review of Systems   A comprehensive 14 point review of systems was performed and was negative except as mentioned.    Medications:  The current medication list was reviewed in the EMR    ALLERGIES:    Allergies   Allergen Reactions    Percocet [Oxycodone-Acetaminophen] Nausea And Vomiting       Objective      Vitals:    06/02/23 0945   BP: 106/51   Pulse: 85   Resp: 18   Temp: 97.7 °F (36.5 °C)   Weight: 59.4 kg (131 lb)   PainSc: 0-No pain         6/2/2023     9:33 AM   Current Status   ECOG score 1       Physical Exam  General:alert and oriented no acute distress  Lungs;normal breath sounds  Card:RRR  Ext; no edema    RECENT LABS:  Glucose   Date Value Ref Range Status   06/02/2023 98 65 - 99 mg/dL Final     Sodium   Date Value Ref Range Status   06/02/2023 142 136 - 145 mmol/L Final     Potassium   Date Value Ref Range Status   06/02/2023 4.0 3.5 - 5.2 mmol/L Final     CO2   Date Value Ref Range Status   06/02/2023 24.0 22.0 - 29.0 mmol/L Final     Chloride   Date Value Ref Range Status   06/02/2023 107 98 - 107 mmol/L Final     Anion Gap   Date Value Ref Range Status   06/02/2023 11.0 5.0 - 15.0 mmol/L Final     Creatinine   Date Value Ref Range Status   06/02/2023 0.53 (L) 0.57 - 1.00 mg/dL Final     BUN   Date Value Ref Range Status   06/02/2023 7 (L) 8 - 23 mg/dL Final     BUN/Creatinine Ratio   Date Value Ref Range Status   06/02/2023 13.2  7.0 - 25.0 Final     Calcium   Date Value Ref Range Status   06/02/2023 8.9 8.6 - 10.5 mg/dL Final     Alkaline Phosphatase   Date Value Ref Range Status   06/02/2023 82 39 - 117 U/L Final     Total Protein   Date Value Ref Range Status   06/02/2023 6.2 6.0 - 8.5 g/dL Final     ALT (SGPT)   Date Value Ref Range Status   06/02/2023 8 1 - 33 U/L Final     AST (SGOT)   Date Value Ref Range Status   06/02/2023 14 1 - 32 U/L Final     Total Bilirubin   Date Value Ref Range Status   06/02/2023 0.2 0.0 - 1.2 mg/dL Final     Albumin   Date Value Ref Range Status   06/02/2023 3.8 3.5 - 5.2 g/dL Final     Globulin   Date Value Ref Range Status   06/02/2023 2.4 gm/dL Final     Lab Results   Component Value Date    WBC 3.69 06/02/2023    HGB 9.9 (L) 06/02/2023    HCT 30.4 (L) 06/02/2023    MCV 95.6 06/02/2023     06/02/2023     Lab Results   Component Value Date    NEUTROABS 2.61 06/02/2023     No results found for: , LABCA2, AFPTM, HCGQUANT, , CHROMGRNA, [id number], CEA, REFLABREPO      PATHOLOGY:  * Cannot find OR log *         RADIOLOGY DATA :  No radiology results for the last 7 days        Assessment & Plan     Metastatic breast cancer with distant LN (mediastinal) metastases; Triple negative.  -status post weekly Carboplatin/Paclitaxel 4/22/20202-7/7/2022; discontinued due to pain peripheral neuropathy in LE.  -status post Xeloda; PET CT scan 9/19/2022 showed progressive disease  -started Trodelvy 10/4/2022; PET CT scan 12/19/2022 showed favorable response to treatment. PET/CT scan 3/27/2023 showed no evidence of progression.  -Pt is due for cycle 11 day 1 today.  -labs reviewed and will proceed with treatment today; she will return to clinic in one week with labs and possible day 8 cycle 11  -RTC in 3 weeks with labs and office visit and cycle 12 day 1.         2. Cancer associated pain; chronic and stable; pt states she does not need any refill at this time.     3. Right upper extremity DVT  -chronic  stable; on Eliquis  -no new bleeding  -continue close monitoring.    4. Mucositis  -chronic, stable; continues on Magic mouthwash PRN    5. Unintentional weight loss  -chronic, stable  -weight today is 131.    6. Chemotherapy induce peripheral neuropathy    -Chronic, stable.  -She is on Neurontin 400 mg 3 times daily.  -She will continue this.             PHQ-9 Total Score: 0     Radha Duarte reports a pain score of 0.  Given her pain assessment as noted, treatment options were discussed and the following options were decided upon as a follow-up plan to address the patient's pain: continuation of current treatment plan for pain.         Estefanía Brady, APRN  6/8/2023  10:00 CDT        Part of this note may be an electronic transcription/translation of spoken language to printed text using the Dragon Dictation System.          CC:

## 2023-06-09 ENCOUNTER — INFUSION (OUTPATIENT)
Dept: ONCOLOGY | Facility: HOSPITAL | Age: 63
End: 2023-06-09
Payer: MEDICARE

## 2023-06-09 VITALS
HEART RATE: 85 BPM | TEMPERATURE: 99.1 F | DIASTOLIC BLOOD PRESSURE: 58 MMHG | RESPIRATION RATE: 18 BRPM | SYSTOLIC BLOOD PRESSURE: 126 MMHG

## 2023-06-09 VITALS — HEART RATE: 85 BPM | SYSTOLIC BLOOD PRESSURE: 126 MMHG | DIASTOLIC BLOOD PRESSURE: 58 MMHG | TEMPERATURE: 99.1 F

## 2023-06-09 DIAGNOSIS — C50.919 MALIGNANT NEOPLASM OF FEMALE BREAST, UNSPECIFIED ESTROGEN RECEPTOR STATUS, UNSPECIFIED LATERALITY, UNSPECIFIED SITE OF BREAST: Primary | ICD-10-CM

## 2023-06-09 DIAGNOSIS — G89.3 CANCER ASSOCIATED PAIN: ICD-10-CM

## 2023-06-09 LAB
ALBUMIN SERPL-MCNC: 3.4 G/DL (ref 3.5–5.2)
ALBUMIN/GLOB SERPL: 1.2 G/DL
ALP SERPL-CCNC: 88 U/L (ref 39–117)
ALT SERPL W P-5'-P-CCNC: 10 U/L (ref 1–33)
ANION GAP SERPL CALCULATED.3IONS-SCNC: 9 MMOL/L (ref 5–15)
AST SERPL-CCNC: 13 U/L (ref 1–32)
BASOPHILS # BLD AUTO: 0.05 10*3/MM3 (ref 0–0.2)
BASOPHILS NFR BLD AUTO: 1.1 % (ref 0–1.5)
BILIRUB SERPL-MCNC: 0.3 MG/DL (ref 0–1.2)
BUN SERPL-MCNC: 8 MG/DL (ref 8–23)
BUN/CREAT SERPL: 16 (ref 7–25)
CALCIUM SPEC-SCNC: 8.6 MG/DL (ref 8.6–10.5)
CHLORIDE SERPL-SCNC: 108 MMOL/L (ref 98–107)
CO2 SERPL-SCNC: 24 MMOL/L (ref 22–29)
CREAT SERPL-MCNC: 0.5 MG/DL (ref 0.57–1)
DEPRECATED RDW RBC AUTO: 51.8 FL (ref 37–54)
EGFRCR SERPLBLD CKD-EPI 2021: 104.2 ML/MIN/1.73
EOSINOPHIL # BLD AUTO: 0.05 10*3/MM3 (ref 0–0.4)
EOSINOPHIL NFR BLD AUTO: 1.1 % (ref 0.3–6.2)
ERYTHROCYTE [DISTWIDTH] IN BLOOD BY AUTOMATED COUNT: 14.8 % (ref 12.3–15.4)
GLOBULIN UR ELPH-MCNC: 2.9 GM/DL
GLUCOSE SERPL-MCNC: 113 MG/DL (ref 65–99)
HCT VFR BLD AUTO: 30.7 % (ref 34–46.6)
HGB BLD-MCNC: 10.2 G/DL (ref 12–15.9)
HOLD SPECIMEN: NORMAL
IMM GRANULOCYTES # BLD AUTO: 0.06 10*3/MM3 (ref 0–0.05)
IMM GRANULOCYTES NFR BLD AUTO: 1.3 % (ref 0–0.5)
LYMPHOCYTES # BLD AUTO: 0.57 10*3/MM3 (ref 0.7–3.1)
LYMPHOCYTES NFR BLD AUTO: 12 % (ref 19.6–45.3)
MCH RBC QN AUTO: 31.9 PG (ref 26.6–33)
MCHC RBC AUTO-ENTMCNC: 33.2 G/DL (ref 31.5–35.7)
MCV RBC AUTO: 95.9 FL (ref 79–97)
MONOCYTES # BLD AUTO: 0.51 10*3/MM3 (ref 0.1–0.9)
MONOCYTES NFR BLD AUTO: 10.8 % (ref 5–12)
NEUTROPHILS NFR BLD AUTO: 3.5 10*3/MM3 (ref 1.7–7)
NEUTROPHILS NFR BLD AUTO: 73.7 % (ref 42.7–76)
NRBC BLD AUTO-RTO: 0 /100 WBC (ref 0–0.2)
PLATELET # BLD AUTO: 154 10*3/MM3 (ref 140–450)
PMV BLD AUTO: 10 FL (ref 6–12)
POTASSIUM SERPL-SCNC: 4.1 MMOL/L (ref 3.5–5.2)
PROT SERPL-MCNC: 6.3 G/DL (ref 6–8.5)
RBC # BLD AUTO: 3.2 10*6/MM3 (ref 3.77–5.28)
SODIUM SERPL-SCNC: 141 MMOL/L (ref 136–145)
WBC NRBC COR # BLD: 4.74 10*3/MM3 (ref 3.4–10.8)

## 2023-06-09 PROCEDURE — 25010000002 SACITUZUMAB GOVITECAN-HZIY 180 MG RECONSTITUTED SOLUTION 1 EACH VIAL: Performed by: INTERNAL MEDICINE

## 2023-06-09 PROCEDURE — 25010000002 HEPARIN LOCK FLUSH PER 10 UNITS: Performed by: INTERNAL MEDICINE

## 2023-06-09 PROCEDURE — 25010000002 PALONOSETRON PER 25 MCG: Performed by: INTERNAL MEDICINE

## 2023-06-09 PROCEDURE — 36415 COLL VENOUS BLD VENIPUNCTURE: CPT

## 2023-06-09 PROCEDURE — 85025 COMPLETE CBC W/AUTO DIFF WBC: CPT

## 2023-06-09 PROCEDURE — 25010000002 DEXAMETHASONE SODIUM PHOSPHATE 100 MG/10ML SOLUTION: Performed by: INTERNAL MEDICINE

## 2023-06-09 PROCEDURE — 25010000002 FOSAPREPITANT PER 1 MG: Performed by: INTERNAL MEDICINE

## 2023-06-09 PROCEDURE — 25010000002 DIPHENHYDRAMINE PER 50 MG: Performed by: INTERNAL MEDICINE

## 2023-06-09 PROCEDURE — 80053 COMPREHEN METABOLIC PANEL: CPT

## 2023-06-09 RX ORDER — FAMOTIDINE 10 MG/ML
20 INJECTION, SOLUTION INTRAVENOUS AS NEEDED
Status: DISCONTINUED | OUTPATIENT
Start: 2023-06-09 | End: 2023-06-09 | Stop reason: HOSPADM

## 2023-06-09 RX ORDER — OLANZAPINE 5 MG/1
5 TABLET ORAL ONCE
Status: COMPLETED | OUTPATIENT
Start: 2023-06-09 | End: 2023-06-09

## 2023-06-09 RX ORDER — ATROPINE SULFATE 1 MG/ML
0.25 INJECTION, SOLUTION INTRAMUSCULAR; INTRAVENOUS; SUBCUTANEOUS
Status: DISCONTINUED | OUTPATIENT
Start: 2023-06-09 | End: 2023-06-09 | Stop reason: HOSPADM

## 2023-06-09 RX ORDER — PALONOSETRON 0.05 MG/ML
0.25 INJECTION, SOLUTION INTRAVENOUS ONCE
Status: CANCELLED | OUTPATIENT
Start: 2023-06-09

## 2023-06-09 RX ORDER — ACETAMINOPHEN 325 MG/1
650 TABLET ORAL ONCE
Status: COMPLETED | OUTPATIENT
Start: 2023-06-09 | End: 2023-06-09

## 2023-06-09 RX ORDER — PALONOSETRON 0.05 MG/ML
0.25 INJECTION, SOLUTION INTRAVENOUS ONCE
Status: COMPLETED | OUTPATIENT
Start: 2023-06-09 | End: 2023-06-09

## 2023-06-09 RX ORDER — FAMOTIDINE 10 MG/ML
20 INJECTION, SOLUTION INTRAVENOUS AS NEEDED
Status: CANCELLED | OUTPATIENT
Start: 2023-06-09

## 2023-06-09 RX ORDER — SODIUM CHLORIDE 9 MG/ML
250 INJECTION, SOLUTION INTRAVENOUS ONCE
Status: COMPLETED | OUTPATIENT
Start: 2023-06-09 | End: 2023-06-09

## 2023-06-09 RX ORDER — SODIUM CHLORIDE 0.9 % (FLUSH) 0.9 %
10 SYRINGE (ML) INJECTION AS NEEDED
Status: DISCONTINUED | OUTPATIENT
Start: 2023-06-09 | End: 2023-06-09 | Stop reason: HOSPADM

## 2023-06-09 RX ORDER — ACETAMINOPHEN 325 MG/1
650 TABLET ORAL ONCE
Status: CANCELLED | OUTPATIENT
Start: 2023-06-09

## 2023-06-09 RX ORDER — MORPHINE SULFATE 30 MG/1
60 TABLET, FILM COATED, EXTENDED RELEASE ORAL 2 TIMES DAILY
Qty: 120 TABLET | Refills: 0 | Status: SHIPPED | OUTPATIENT
Start: 2023-06-09

## 2023-06-09 RX ORDER — OLANZAPINE 5 MG/1
5 TABLET ORAL ONCE
Status: CANCELLED | OUTPATIENT
Start: 2023-06-09 | End: 2023-06-09

## 2023-06-09 RX ORDER — DIPHENHYDRAMINE HYDROCHLORIDE 50 MG/ML
50 INJECTION INTRAMUSCULAR; INTRAVENOUS AS NEEDED
Status: CANCELLED | OUTPATIENT
Start: 2023-06-09

## 2023-06-09 RX ORDER — ATROPINE SULFATE 1 MG/ML
0.25 INJECTION, SOLUTION INTRAMUSCULAR; INTRAVENOUS; SUBCUTANEOUS
Status: CANCELLED | OUTPATIENT
Start: 2023-06-09

## 2023-06-09 RX ORDER — DIPHENHYDRAMINE HYDROCHLORIDE 50 MG/ML
50 INJECTION INTRAMUSCULAR; INTRAVENOUS AS NEEDED
Status: DISCONTINUED | OUTPATIENT
Start: 2023-06-09 | End: 2023-06-09 | Stop reason: HOSPADM

## 2023-06-09 RX ORDER — FAMOTIDINE 10 MG/ML
20 INJECTION, SOLUTION INTRAVENOUS ONCE
Status: CANCELLED | OUTPATIENT
Start: 2023-06-09

## 2023-06-09 RX ORDER — SODIUM CHLORIDE 9 MG/ML
250 INJECTION, SOLUTION INTRAVENOUS ONCE
Status: CANCELLED | OUTPATIENT
Start: 2023-06-09

## 2023-06-09 RX ORDER — HEPARIN SODIUM (PORCINE) LOCK FLUSH IV SOLN 100 UNIT/ML 100 UNIT/ML
500 SOLUTION INTRAVENOUS AS NEEDED
Status: DISCONTINUED | OUTPATIENT
Start: 2023-06-09 | End: 2023-06-09 | Stop reason: HOSPADM

## 2023-06-09 RX ORDER — FAMOTIDINE 10 MG/ML
20 INJECTION, SOLUTION INTRAVENOUS ONCE
Status: COMPLETED | OUTPATIENT
Start: 2023-06-09 | End: 2023-06-09

## 2023-06-09 RX ADMIN — OLANZAPINE 5 MG: 5 TABLET, FILM COATED ORAL at 09:07

## 2023-06-09 RX ADMIN — FOSAPREPITANT 100 ML: 150 INJECTION, POWDER, LYOPHILIZED, FOR SOLUTION INTRAVENOUS at 09:32

## 2023-06-09 RX ADMIN — FAMOTIDINE 20 MG: 10 INJECTION INTRAVENOUS at 09:15

## 2023-06-09 RX ADMIN — PALONOSETRON 0.25 MG: 0.05 INJECTION, SOLUTION INTRAVENOUS at 09:07

## 2023-06-09 RX ADMIN — DIPHENHYDRAMINE HYDROCHLORIDE 25 MG: 50 INJECTION, SOLUTION INTRAMUSCULAR; INTRAVENOUS at 10:14

## 2023-06-09 RX ADMIN — SACITUZUMAB GOVITECAN 460 MG: 180 POWDER, FOR SOLUTION INTRAVENOUS at 11:17

## 2023-06-09 RX ADMIN — SODIUM CHLORIDE 250 ML: 9 INJECTION, SOLUTION INTRAVENOUS at 09:06

## 2023-06-09 RX ADMIN — ACETAMINOPHEN 650 MG: 325 TABLET, FILM COATED ORAL at 09:07

## 2023-06-09 RX ADMIN — HEPARIN 500 UNITS: 100 SYRINGE at 12:34

## 2023-06-09 RX ADMIN — DEXAMETHASONE SODIUM PHOSPHATE 12 MG: 10 INJECTION, SOLUTION INTRAMUSCULAR; INTRAVENOUS at 10:41

## 2023-06-14 ENCOUNTER — OFFICE VISIT (OUTPATIENT)
Dept: ORTHOPEDIC SURGERY | Facility: CLINIC | Age: 63
End: 2023-06-14
Payer: MEDICARE

## 2023-06-14 VITALS — WEIGHT: 133 LBS | HEIGHT: 61 IN | BODY MASS INDEX: 25.11 KG/M2
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
## 2023-06-14 DIAGNOSIS — C50.919 MALIGNANT NEOPLASM OF FEMALE BREAST, UNSPECIFIED ESTROGEN RECEPTOR STATUS, UNSPECIFIED LATERALITY, UNSPECIFIED SITE OF BREAST: ICD-10-CM

## 2023-06-14 DIAGNOSIS — S42.292A HUMERAL HEAD FRACTURE, LEFT, CLOSED, INITIAL ENCOUNTER: Primary | ICD-10-CM

## 2023-06-14 DIAGNOSIS — M25.512 ACUTE PAIN OF LEFT SHOULDER: Primary | ICD-10-CM

## 2023-06-14 RX ORDER — ONDANSETRON HYDROCHLORIDE 8 MG/1
8 TABLET, FILM COATED ORAL EVERY 8 HOURS PRN
Qty: 270 TABLET | Refills: 0 | Status: SHIPPED | OUTPATIENT
Start: 2023-06-14

## 2023-06-14 NOTE — PROGRESS NOTES
Radha Duarte is a 63 y.o. female   Primary provider:  Johnnie Grossman APRN       Chief Complaint   Patient presents with   • Left Upper Arm - Fracture       HISTORY OF PRESENT ILLNESS:    Mrs. Duarte is a 63-year-old female who presents today for initial evaluation of left humerus fracture which occurred on 6/7/2023 after a [place].  She is 7 days post injury.  She describes her pain as severe and intermittent.  Pain is a stabbing pain that is associated with bruising and swelling.  Walking aggravates pain.  She takes morphine from pain management.  She reports some numbness and tingling, she states this is secondary to neuropathy from chemotherapy.  Patient states she is currently taking chemotherapy for triple negative breast cancer.  She is sent for new x-rays.      Fracture  This is a new (fell at the mall. ) problem. Episode onset: 6/7/2023. The problem occurs intermittently. The problem has been unchanged. Associated symptoms include chills, a fever and headaches. Associated symptoms comments: Stabbing,bruising, swelling. . The symptoms are aggravated by walking. She has tried immobilization, NSAIDs, heat and ice for the symptoms.        CONCURRENT MEDICAL HISTORY:    Past Medical History:   Diagnosis Date   • Acute deep vein thrombosis (DVT) of brachial vein of right upper extremity 03/2022   • Anemia    • Breast cancer 02/22/2022   • Encounter for antineoplastic chemotherapy     2 cycles Taxol/Carbo before   • Headache    • Hyperlipidemia    • Low back pain 1986    Neck. back, & lower back has grown wrost brayan the years   • Osteopenia August 2018   • Osteoporosis     neck, lower back   • Urinary tract infection 1976    Was on a medicine prescription said i indefinitely. I discovered drinking tea was the problem.   • Varicose veins of right lower extremity     hx blood clot in right lower calf - age 21       Allergies   Allergen Reactions   • Percocet  [Oxycodone-Acetaminophen] Nausea And Vomiting         Current Outpatient Medications:   •  acetaminophen (TYLENOL) 500 MG tablet, Take 1 tablet by mouth Every 6 (Six) Hours As Needed for Mild Pain., Disp: , Rfl:   •  atorvastatin (LIPITOR) 20 MG tablet, Take 1 tablet by mouth Daily. Take one tablet by mouth at bedtime., Disp: 90 tablet, Rfl: 3  •  Calcium Carb-Cholecalciferol (CALCIUM/VITAMIN D PO), Take 1 tablet by mouth Daily., Disp: , Rfl:   •  docusate sodium (Colace) 100 MG capsule, Take 1 capsule by mouth 2 (Two) Times a Day. (Patient taking differently: Take 1 capsule by mouth Daily.), Disp: 90 capsule, Rfl: 0  •  DPH-Lido-AlHydr-MgHydr-Simeth (First Mouthwash, Magic Mouthwash,) suspension, Swish and spit 5 mL Every 6 (Six) Hours., Disp: 237 mL, Rfl: 0  •  Eliquis 5 MG tablet tablet, TAKE 1 TABLET EVERY 12 HOURS, Disp: 60 tablet, Rfl: 11  •  gabapentin (NEURONTIN) 400 MG capsule, TAKE 1 CAPSULE THREE TIMES A DAY, Disp: 270 capsule, Rfl: 0  •  ibandronate (BONIVA) 150 MG tablet, Take 1 tablet by mouth Every 30 (Thirty) Days. Take one tablet by mouth once monthly, Disp: 6 tablet, Rfl: 1  •  Loperamide HCl (IMODIUM A-D PO), Take  by mouth., Disp: , Rfl:   •  Morphine (MS CONTIN) 15 MG 12 hr tablet, Take 2 tablets by mouth 2 (Two) Times a Day., Disp: 90 tablet, Rfl: 0  •  Morphine (MS CONTIN) 30 MG 12 hr tablet, Take 2 tablets by mouth 2 (Two) Times a Day., Disp: 120 tablet, Rfl: 0  •  multivitamin with minerals tablet tablet, Take 1 tablet by mouth Daily., Disp: , Rfl:   •  OLANZapine (zyPREXA) 5 MG tablet, TAKE 1 TABLET EVERY NIGHT. TAKE AFTER CHEMOTHERAPY ON DAYS 2, 3, 4, AND DAYS 9, 10, 11., Disp: 50 tablet, Rfl: 3  •  omeprazole (priLOSEC) 20 MG capsule, Take 1 capsule by mouth 2 (Two) Times a Day., Disp: 180 capsule, Rfl: 0  •  ondansetron (ZOFRAN) 8 MG tablet, Take 1 tablet by mouth Every 8 (Eight) Hours As Needed for Nausea or Vomiting., Disp: 270 tablet, Rfl: 0  •  potassium chloride (K-DUR,KLOR-CON) 20  MEQ CR tablet, TAKE 1 TABLET DAILY, Disp: 90 tablet, Rfl: 3  •  Probiotic Product (PROBIOTIC DAILY PO), Take 1 dose by mouth Daily., Disp: , Rfl:   •  promethazine (PHENERGAN) 12.5 MG tablet, TAKE 1 TABLET EVERY 6 HOURS AS NEEDED FOR NAUSEA OR VOMITING, Disp: 360 tablet, Rfl: 3  •  HYDROcodone-acetaminophen (NORCO) 5-325 MG per tablet, Take 1 tablet by mouth Every 6 (Six) Hours As Needed for Severe Pain . (Patient not taking: Reported on 6/14/2023), Disp: 90 tablet, Rfl: 0    Past Surgical History:   Procedure Laterality Date   • BREAST BIOPSY Right 02/22/2022   • COLONOSCOPY N/A 03/09/2020    Procedure: COLONOSCOPY;  Surgeon: Bishop Rodriguez DO;  Location: [facility] ENDOSCOPY;  Service: Gastroenterology;  Laterality: N/A;   • MEDIPORT INSERTION, SINGLE Left 05/17/2022   • SHOULDER MANIPULATION Bilateral 2003    frozen shoulder   • TUBAL ABDOMINAL LIGATION  August 7 1998   • VARICOSE VEIN SURGERY  1986       Family History   Problem Relation Age of Onset   • ALS Mother    • Ulcers Mother    • Heart disease Father    • Alcohol abuse Father    • Hyperlipidemia Father    • Cancer Brother    • Hyperlipidemia Brother    • Stroke Brother    • COPD Brother    • Anuerysm Brother    • Hyperlipidemia Brother    • Heart disease Paternal Grandmother    • Diabetes Paternal Grandmother    • Stroke Paternal Aunt    • Hypertension Paternal Grandfather    • Cancer Maternal Uncle    • Hyperlipidemia Brother         Social History     Socioeconomic History   • Marital status:    • Number of children: 4   • Highest education level: GED or equivalent   Tobacco Use   • Smoking status: Every Day     Packs/day: 1.00     Years: 40.00     Pack years: 40.00     Types: Cigarettes     Start date: 1/1/1982   • Smokeless tobacco: Never   • Tobacco comments:     Depends on day-sometimes a pack 3/4-1 pack, stopped 2 years   Vaping Use   • Vaping Use: Former   • Substances: Nicotine, Flavoring, made throat dry, cough   • Devices: Pre-filled  "or refillable cartridge   Substance and Sexual Activity   • Alcohol use: Not Currently   • Drug use: Never   • Sexual activity: Not Currently     Partners: Male     Birth control/protection: Condom, Injection, Vasectomy, Birth control pill        Review of Systems   Constitutional: Positive for chills and fever.   Eyes: Positive for pain.   Endocrine: Positive for heat intolerance.   Neurological: Positive for headaches.   All other systems reviewed and are negative.      PHYSICAL EXAMINATION:       Ht 154.9 cm (61\")   Wt 60.3 kg (133 lb)   BMI 25.13 kg/m²     Physical Exam  Vitals and nursing note reviewed.   Constitutional:       General: She is not in acute distress.     Appearance: She is well-developed. She is not toxic-appearing or diaphoretic.   HENT:      Head: Normocephalic.   Eyes:      General: No scleral icterus.  Pulmonary:      Effort: Pulmonary effort is normal. No respiratory distress.   Skin:     General: Skin is warm and dry.   Neurological:      Mental Status: She is alert and oriented to person, place, and time.   Psychiatric:         Behavior: Behavior normal.         Thought Content: Thought content normal.         Judgment: Judgment normal.         GAIT:     []  Normal  []  Antalgic    Assistive device: []  None  []  Walker     []  Crutches  []  Cane     []  Wheelchair  []  Stretcher    Left Shoulder Exam     Comments:  Strength and range of motion testing deferred due to known fracture.  Mild to moderate bruising and swelling.  Fingers are warm, pink, good capillary refill and normal sensation.  Neurovascular is intact.              XR Shoulder 2+ View Left    Result Date: 6/8/2023  Narrative: XR SHOULDER LEFT 2 VIEWS HISTORY: Trauma COMPARISON: None FINDINGS: 3 views of the left shoulder include internal rotation, external rotation, and scapular Y views. There is an acute, nondisplaced fracture through the greater tuberosity of the left humerus. Degenerative changes are seen in the " acromioclavicular joint. The visualized left thorax and surrounding soft tissues are within normal limits.     Impression: Nondisplaced, acute fracture through the left greater tuberosity.     XR Humerus Left    Result Date: 6/8/2023  Narrative: FINDINGS: Findings are suspicious for a humeral head fracture involving the greater tubercle. CT of the shoulder is warranted for further evaluation.     Impression: Findings are suspicious for a nondisplaced fracture of the greater tubercle of the humeral head. CT is warranted for further evaluation.          ASSESSMENT:    Diagnoses and all orders for this visit:    Humeral head fracture, left, closed, initial encounter    Accidental fall, initial encounter          PLAN    X-rays reviewed, fracture stable.  Patient is to stay in sling at all times other than to perform pendulum exercises and range of motion of elbow and fingers.  Patient to do range of motion of elbow, wrist, fingers and pendulum exercises of shoulder 2-3 times per day to prevent stiffness.  Patient is to remain nonweightbearing to left upper arm.  Patient is to return in 2 weeks for repeat x-rays.  As long as x-rays are stable we will start formal physical therapy.  Plan for passive range of motion only for the first 3 weeks, once 6 weeks post injury patient may start AAROM and advance to AROM as tolerated.  No strength or resistance activities until 8 to 10 weeks post injury.    Patient's most recent DEXA scan was performed in 2022 T score at that time was -3.1, patient currently takes Boniva.  Recommend starting a more aggressive osteoporosis treatment.  Patient is interested in Prolia.  I will discuss starting Prolia or Evenity with patient's oncologist, Dr. Meade.    EMR Dragon/Transciption Disclaimer: Some of this note may be an electronic transcription/translation of spoken language to printed text.  The electronic translation of spoken language may permit erroneous, or at times, nonsensical  words or phrases to be inadvertently transcribed. Although I have reviewed the note for such errors, some may still exist.       This document has been electronically signed by Mary HARTMAN on June 14, 2023 14:38 CDT     Spoke with Dr. Meade, no contraindication for Prolia at this time.      EMR Dragon/Transciption Disclaimer: Some of this note may be an electronic transcription/translation of spoken language to printed text.  The electronic translation of spoken language may permit erroneous, or at times, nonsensical words or phrases to be inadvertently transcribed. Although I have reviewed the note for such errors, some may still exist.       This document has been electronically signed by Mary HARTMAN on June 16, 2023 12:14 CDT     What Type Of Note Output Would You Prefer (Optional)?: Bullet Format Have Your Spot(S) Been Treated In The Past?: has not been treated Hpi Title: Evaluation of a Skin Lesion

## 2023-06-14 NOTE — TELEPHONE ENCOUNTER
Rx Refill Note  Requested Prescriptions     Pending Prescriptions Disp Refills    ondansetron (ZOFRAN) 8 MG tablet 270 tablet 0     Sig: Take 1 tablet by mouth Every 8 (Eight) Hours As Needed for Nausea or Vomiting.      Last office visit with prescribing clinician: 5/12/2023   Last telemedicine visit with prescribing clinician: Visit date not found   Next office visit with prescribing clinician: 6/23/2023                         Would you like a call back once the refill request has been completed: [] Yes [] No    If the office needs to give you a call back, can they leave a voicemail: [] Yes [] No    Juliet Smith RN  06/14/23, 16:26 CDT

## 2023-07-14 PROBLEM — Z79.899 OTHER LONG TERM (CURRENT) DRUG THERAPY: Status: ACTIVE | Noted: 2023-07-14

## 2023-07-24 ENCOUNTER — OFFICE VISIT (OUTPATIENT)
Dept: ORTHOPEDIC SURGERY | Facility: CLINIC | Age: 63
End: 2023-07-24
Payer: MEDICARE

## 2023-07-24 ENCOUNTER — TELEPHONE (OUTPATIENT)
Dept: ONCOLOGY | Facility: HOSPITAL | Age: 63
End: 2023-07-24
Payer: MEDICARE

## 2023-07-24 VITALS — BODY MASS INDEX: 25.39 KG/M2 | WEIGHT: 134.5 LBS | HEIGHT: 61 IN

## 2023-07-24 DIAGNOSIS — S42.292D FRACTURE OF HUMERAL HEAD, LEFT, CLOSED, WITH ROUTINE HEALING, SUBSEQUENT ENCOUNTER: Primary | ICD-10-CM

## 2023-07-24 PROCEDURE — 99024 POSTOP FOLLOW-UP VISIT: CPT | Performed by: NURSE PRACTITIONER

## 2023-07-24 PROCEDURE — 1159F MED LIST DOCD IN RCRD: CPT | Performed by: NURSE PRACTITIONER

## 2023-07-24 PROCEDURE — 1160F RVW MEDS BY RX/DR IN RCRD: CPT | Performed by: NURSE PRACTITIONER

## 2023-07-24 NOTE — PROGRESS NOTES
[facility]    Name:  Radha Duarte  Date:  2023  :  1960    Chief Complaint:    Chief Complaint   Patient presents with    Left Shoulder - Post-op         History of Present Illness:    Mrs. Duarte is a 63-year-old female who presents today for follow-up evaluation of left humerus fracture which occurred on 2023 after a [place].  She is 6.5 weeks days injury.  Pain continues to improve.  She has transitioned out of sling.   She reports doing gentle range of motion of hand, fingers and elbow.  She has been working with PT.   She is sent for new x-rays.        Current Outpatient Medications:     acetaminophen (TYLENOL) 500 MG tablet, Take 1 tablet by mouth Every 6 (Six) Hours As Needed for Mild Pain., Disp: , Rfl:     atorvastatin (LIPITOR) 20 MG tablet, Take 1 tablet by mouth Daily. Take one tablet by mouth at bedtime., Disp: 90 tablet, Rfl: 3    Bioflavonoid Products (Vitamin C) chewable tablet, Chew 1,000 mg., Disp: , Rfl:     Calcium Carb-Cholecalciferol (CALCIUM/VITAMIN D PO), Take 1 tablet by mouth Daily., Disp: , Rfl:     Calcium Carb-Magnesium Carb (MAGNEBIND 400 PO), Take  by mouth., Disp: , Rfl:     Cholecalciferol (Vitamin D3) 25 MCG (1000 UT) chewable tablet, Chew., Disp: , Rfl:     Coenzyme Q10 (CoQ10) 200 MG capsule, Take  by mouth., Disp: , Rfl:     docusate sodium (Colace) 100 MG capsule, Take 1 capsule by mouth 2 (Two) Times a Day. (Patient taking differently: Take 1 capsule by mouth Daily.), Disp: 90 capsule, Rfl: 0    DPH-Lido-AlHydr-MgHydr-Simeth (First Mouthwash, Magic Mouthwash,) suspension, Swish and spit 5 mL Every 6 (Six) Hours., Disp: 237 mL, Rfl: 0    Eliquis 5 MG tablet tablet, TAKE 1 TABLET EVERY 12 HOURS, Disp: 60 tablet, Rfl: 11    fluticasone (FLONASE) 50 MCG/ACT nasal spray, 2 sprays into the nostril(s) as directed by provider Daily., Disp: , Rfl:     gabapentin (NEURONTIN) 400 MG capsule, TAKE 1 CAPSULE THREE TIMES A  "DAY, Disp: 270 capsule, Rfl: 0    Garlic 1000 MG capsule, Take  by mouth., Disp: , Rfl:     Loperamide HCl (IMODIUM A-D PO), Take  by mouth., Disp: , Rfl:     Magnesium Oxide 420 MG tablet, Take  by mouth., Disp: , Rfl:     Morphine (MS CONTIN) 60 MG 12 hr tablet, Take 1 tablet by mouth 2 (Two) Times a Day., Disp: 60 tablet, Rfl: 0    multivitamin with minerals tablet tablet, Take 1 tablet by mouth Daily., Disp: , Rfl:     omeprazole (priLOSEC) 20 MG capsule, Take 1 capsule by mouth 2 (Two) Times a Day., Disp: 180 capsule, Rfl: 0    ondansetron (ZOFRAN) 8 MG tablet, Take 1 tablet by mouth 3 (Three) Times a Day As Needed for Nausea or Vomiting., Disp: 30 tablet, Rfl: 5    potassium chloride (K-DUR,KLOR-CON) 20 MEQ CR tablet, TAKE 1 TABLET DAILY, Disp: 90 tablet, Rfl: 3    Probiotic Product (PROBIOTIC DAILY PO), Take 1 dose by mouth Daily., Disp: , Rfl:     promethazine (PHENERGAN) 12.5 MG tablet, TAKE 1 TABLET EVERY 6 HOURS AS NEEDED FOR NAUSEA OR VOMITING, Disp: 360 tablet, Rfl: 3    triamcinolone (KENALOG) 0.1 % cream, Apply 1 application  topically to the appropriate area as directed 2 (Two) Times a Day., Disp: 60 g, Rfl: 0    Zinc 50 MG tablet, Take  by mouth., Disp: , Rfl:   No current facility-administered medications for this visit.    Facility-Administered Medications Ordered in Other Visits:     heparin injection 500 Units, 500 Units, Intravenous, PRN, Trisha Meade MD, 500 Units at 07/14/23 0829    Allergies   Allergen Reactions    Percocet [Oxycodone-Acetaminophen] Nausea And Vomiting         Exam:      Vitals:    07/24/23 1442   Weight: 61 kg (134 lb 8 oz)   Height: 154.9 cm (60.98\")       The patient is awake, alert, and oriented and in no apparent distress.            Assessment:    Left Shoulder Exam     Tenderness   Left shoulder tenderness location: mild diffuse tenderness.    Range of Motion   Active abduction:  normal   Forward flexion:  normal     Other   Erythema: absent     Comments:  " Mild discomfort with arc of motion but good range of motion        XR Shoulder 2+ View Left    Result Date: 7/24/2023  Healing left greater tuberosity fracture without displacement.       Diagnoses and all orders for this visit:    Fracture of humeral head, left, closed, with routine healing, subsequent encounter          Plan:    X-rays reviewed, continued healing noted.  Patient improves with range of motion.  Patient instructed to continue working with formal physical therapy.  Signs and symptoms to report including when to seek care explained.  Patient is to return in 4 weeks for recheck with repeat x-rays.    EMR Dragon/Transciption Disclaimer: Some of this note may be an electronic transcription/translation of spoken language to printed text.  The electronic translation of spoken language may permit erroneous, or at times, nonsensical words or phrases to be inadvertently transcribed. Although I have reviewed the note for such errors, some may still exist.       This document has been electronically signed by Mary HARTMAN on July 25, 2023 13:35 CDT

## 2023-07-24 NOTE — TELEPHONE ENCOUNTER
Follow up to assess patient after receiving chemotherapy/biotherapy on 2/21/2023. Patient states that she has been feeling very tired.  Denies having fever, nausea, vomiting, dizziness, headaches, lightheadedness or any other problems. Reinforced to notify us for any of those symptoms or if the fatigue becomes severe.  Verbalized understanding.        Date: 7/24/2023  12:31 CDT   Lisette Turpin RN

## 2023-07-25 ENCOUNTER — HOSPITAL ENCOUNTER (OUTPATIENT)
Dept: PHYSICAL THERAPY | Facility: HOSPITAL | Age: 63
Setting detail: THERAPIES SERIES
Discharge: HOME | End: 2023-07-25
Payer: MEDICARE

## 2023-07-25 DIAGNOSIS — S42.292A HUMERAL HEAD FRACTURE, LEFT, CLOSED, INITIAL ENCOUNTER: Primary | ICD-10-CM

## 2023-07-25 PROCEDURE — 97110 THERAPEUTIC EXERCISES: CPT

## 2023-07-25 NOTE — THERAPY TREATMENT NOTE
"  Outpatient Physical Therapy Ortho Treatment Note  [facility]     Patient Name: Radha Duarte  : 1960  MRN: [id number]  Today's Date: 2023      Visit Date: 2023    Subjective Improvement: \"better\"  Attendance:   ([facility])  Next MD Visit : 23  Recert Date:  23        Therapy Diagnosis:  L Humeral Head Fracture s/p fall on 23    Visit Dx:    ICD-10-CM ICD-9-CM   1. Humeral head fracture, left, closed, initial encounter  S42.292A 812.09       Patient Active Problem List   Diagnosis    Cancer associated pain    Malignant neoplasm of female breast    Acute deep vein thrombosis (DVT) of axillary vein of left upper extremity    Mucositis due to antineoplastic therapy    Hand foot syndrome    Chemotherapy-induced peripheral neuropathy    Suprapubic pain    Metastatic breast cancer    Hypotension due to hypovolemia    Cancer    Humeral head fracture, left, closed, initial encounter    Accidental fall    Other long term (current) drug therapy        Past Medical History:   Diagnosis Date    Acute deep vein thrombosis (DVT) of brachial vein of right upper extremity 2022    Anemia     Breast cancer 2022    Clotting disorder     Takes blood thinners    Encounter for antineoplastic chemotherapy     2 cycles Taxol/Carbo before    Headache     Hyperlipidemia     Low back pain     Neck. back, & lower back has grown wrost brayan the years    Osteopenia 2018    Osteoporosis     neck, lower back    Scoliosis 2019    Urinary tract infection     Was on a medicine prescription said i indefinitely. I discovered drinking tea was the problem.    Varicose veins of right lower extremity     hx blood clot in right lower calf - age 21        Past Surgical History:   Procedure Laterality Date    BREAST BIOPSY Right 2022    COLONOSCOPY N/A 2020    Procedure: COLONOSCOPY;  Surgeon: Bishop Rodriguez DO;  Location: [facility] ENDOSCOPY;  Service: Gastroenterology;  " Laterality: N/A;    LYMPH NODE BIOPSY      MEDIPORT INSERTION, SINGLE Left 05/17/2022    SHOULDER MANIPULATION Bilateral 2003    frozen shoulder    TUBAL ABDOMINAL LIGATION  August 7 1998    VARICOSE VEIN SURGERY  1986        PT Ortho       Row Name 07/25/23 1100       Precautions and Contraindications    Precautions per MD referral: PROM until 6 weeks post injury (7/19/23), then progress AA/AROM as tolerated no strength until 8-10 weeks.  -    Contraindications active Breast CA; limited AROM of the R shoulder due to cancer  -       Posture/Observations    Posture/Observations Comments rounded shoulder; fwd head posture;  -              User Key  (r) = Recorded By, (t) = Taken By, (c) = Cosigned By      Initials Name Provider Type    Nasra eSrvin PTA Physical Therapist Assistant                                 PT Assessment/Plan       Row Name 07/25/23 1100          PT Assessment    Functional Limitations Limitation in home management;Limitations in functional capacity and performance;Performance in leisure activities;Performance in self-care ADL  -     Impairments Impaired flexibility;Muscle strength;Pain;Posture;Range of motion  -     Assessment Comments 6 weeks 6 days post; continues to progress well with ROM of the L shoulder; little discomfort at end range but able to do AROM without problem this date and good response to isomettrics; no change in HEP  -     Rehab Potential Good  -     Patient/caregiver participated in establishment of treatment plan and goals Yes  -     Patient would benefit from skilled therapy intervention Yes  -        PT Plan    PT Frequency 2x/week  -     Predicted Duration of Therapy Intervention (PT) 6-8 weeks  -     PT Plan Comments Add isometrics to HEP next; Progress AROM and gentle strengthening as able.  -               User Key  (r) = Recorded By, (t) = Taken By, (c) = Cosigned By      Initials Name Provider Type    Nasra Servin PTA Physical  "Therapist Assistant                       OP Exercises       Row Name 07/25/23 1100             Subjective Comments    Subjective Comments Reports that the MD was pleased with progress; Reports that there is no restrictions with shoulder at this time; Feeling good today;  -KH         Subjective Pain    Able to rate subjective pain? yes  -KH      Pre-Treatment Pain Level 1  -KH      Post-Treatment Pain Level 1  -KH         Exercise 1    Exercise Name 1 pro ll UE/LE strength  -KH      Cueing 1 Verbal;Demo  -KH      Time 1 10 mins  -KH      Additional Comments level 1; seat 8  -KH         Exercise 2    Exercise Name 2 semi-reclined AROM shoulder flexion  -KH      Cueing 2 Verbal  -KH      Sets 2 2  -KH      Reps 2 10  -KH         Exercise 3    Exercise Name 3 semi-reclined AAROM shoulder ER  -KH      Cueing 3 Verbal;Demo  -KH      Sets 3 2  -KH      Reps 3 10  -KH         Exercise 4    Exercise Name 4 isometric shoulder 6 way with PTA resistance  -      Cueing 4 Verbal;Tactile  -KH      Sets 4 1  -KH      Reps 4 10  -KH      Time 4 5\" holds  -KH         Exercise 5    Exercise Name 5 PROM to the L shoulder  -      Time 5 10 mins  -KH                User Key  (r) = Recorded By, (t) = Taken By, (c) = Cosigned By      Initials Name Provider Type    Nasra Servin PTA Physical Therapist Assistant                                  PT OP Goals       Row Name 07/25/23 1100          PT Short Term Goals    STG Date to Achieve 08/03/23  -     STG 1 Pt is indpt with HEP.  -KH     STG 1 Progress Ongoing  -     STG 2 PROM flex/abduction 130 deg or better.  -     STG 2 Progress Met  -     STG 3 PROM ER (@ 45 abd) 60 deg or better.  -     STG 3 Progress Met  -     STG 4 Pt demo's L  strength at or above RUE.  -     STG 4 Progress Ongoing  -        Long Term Goals    LTG Date to Achieve 08/31/23  -KH     LTG 1 Pt demo's L shoulder AROM flex/abduction to 150 deg or better.  -     LTG 1 Progress Ongoing  -     " LTG 2 Pt demos L shoulder AROM IR/ER to 70 deg or better.  -     LTG 2 Progress Ongoing  -     LTG 3 Pt demo's L shoulder MMT 4+/5 or better in all planes.  -     LTG 3 Progress Ongoing  -     LTG 4 Pt reports able to complete ADLs to pre-injury level.  -     LTG 4 Progress Ongoing  -        Time Calculation    PT Goal Re-Cert Due Date 07/27/23  -               User Key  (r) = Recorded By, (t) = Taken By, (c) = Cosigned By      Initials Name Provider Type    Nasra Servin PTA Physical Therapist Assistant                                   Time Calculation:   Start Time: 1130  Stop Time: 1210  Time Calculation (min): 40 min  Therapy Charges for Today       Code Description Service Date Service Provider Modifiers Qty    [id number] HC PT THER PROC EA 15 MIN 7/25/2023 Nasra Canseco PTA GP, CQ 3                      Nasra Canseco PTA  7/25/2023

## 2023-07-27 ENCOUNTER — HOSPITAL ENCOUNTER (OUTPATIENT)
Dept: PHYSICAL THERAPY | Facility: HOSPITAL | Age: 63
Setting detail: THERAPIES SERIES
Discharge: HOME | End: 2023-07-27
Payer: MEDICARE

## 2023-07-27 ENCOUNTER — APPOINTMENT (OUTPATIENT)
Dept: PHYSICAL THERAPY | Facility: HOSPITAL | Age: 63
End: 2023-07-27
Payer: MEDICARE

## 2023-07-27 DIAGNOSIS — S42.292A HUMERAL HEAD FRACTURE, LEFT, CLOSED, INITIAL ENCOUNTER: Primary | ICD-10-CM

## 2023-07-27 PROCEDURE — 97110 THERAPEUTIC EXERCISES: CPT

## 2023-07-28 ENCOUNTER — OFFICE VISIT (OUTPATIENT)
Dept: ONCOLOGY | Facility: CLINIC | Age: 63
End: 2023-07-28
Payer: MEDICARE

## 2023-07-28 ENCOUNTER — DOCUMENTATION (OUTPATIENT)
Dept: ONCOLOGY | Facility: CLINIC | Age: 63
End: 2023-07-28

## 2023-07-28 VITALS
RESPIRATION RATE: 18 BRPM | TEMPERATURE: 97.6 F | WEIGHT: 133 LBS | OXYGEN SATURATION: 91 % | DIASTOLIC BLOOD PRESSURE: 59 MMHG | BODY MASS INDEX: 25.15 KG/M2 | SYSTOLIC BLOOD PRESSURE: 118 MMHG | HEART RATE: 94 BPM

## 2023-07-28 DIAGNOSIS — G89.3 CANCER ASSOCIATED PAIN: ICD-10-CM

## 2023-07-28 DIAGNOSIS — C50.919 MALIGNANT NEOPLASM OF FEMALE BREAST, UNSPECIFIED ESTROGEN RECEPTOR STATUS, UNSPECIFIED LATERALITY, UNSPECIFIED SITE OF BREAST: Primary | ICD-10-CM

## 2023-07-28 RX ORDER — MORPHINE SULFATE 60 MG/1
60 TABLET, FILM COATED, EXTENDED RELEASE ORAL 3 TIMES DAILY
Qty: 60 TABLET | Refills: 0 | Status: SHIPPED | OUTPATIENT
Start: 2023-07-28

## 2023-07-28 NOTE — PROGRESS NOTES
Subjective     Radha Duarte was seen in follow up for breast cancer.   She received liposomal doxorubicin last week.  Has struggled with nausea, fatigue, tiredness.  Reports right breast, mass significantly worse over last 1 week.    Past Medical History, Past Surgical History, Social History, Family History have been reviewed and are without significant changes except as mentioned.      Medications:  The current medication list was reviewed in the EMR    ALLERGIES:    Allergies   Allergen Reactions    Percocet [Oxycodone-Acetaminophen] Nausea And Vomiting       Objective      Vitals:    07/28/23 0850   BP: 118/59   Pulse: 94   Resp: 18   Temp: 97.6 °F (36.4 °C)   SpO2: 91%             6/9/2023     8:20 AM   Current Status   ECOG score 2       Physical Exam  Vitals and nursing note reviewed. Exam conducted with a chaperone present (Exam was performed in the presence of chaperone -Sasha).   Constitutional:       Appearance: Normal appearance. She is ill-appearing.   Skin:     Comments: Worsening right breast metastatic disease with masslike consolidation and thickening involving right breast, ulcerated lesion in the right upper chest, thickening and masslike consolidation includes upper abdomen, right axilla, chest wall as well as back.  Significantly worse in comparison to prior exam   Neurological:      Mental Status: She is alert.   Psychiatric:         Mood and Affect: Mood normal.         Behavior: Behavior normal.         Thought Content: Thought content normal.            RECENT LABS:Independently reviewed and summarized  Hematology WBC   Date Value Ref Range Status   07/21/2023 5.09 3.40 - 10.80 10*3/mm3 Final     RBC   Date Value Ref Range Status   07/21/2023 3.17 (L) 3.77 - 5.28 10*6/mm3 Final     Hemoglobin   Date Value Ref Range Status   07/21/2023 9.7 (L) 12.0 - 15.9 g/dL Final     Hematocrit   Date Value Ref Range Status   07/21/2023 30.6 (L) 34.0 - 46.6 % Final     Platelets   Date Value  Ref Range Status   07/21/2023 202 140 - 450 10*3/mm3 Final     Lab Results   Component Value Date    GLUCOSE 113 (H) 07/21/2023    BUN 7 (L) 07/21/2023    CREATININE 0.53 (L) 07/21/2023    EGFR 102.8 07/21/2023    BCR 13.2 07/21/2023    K 3.8 07/21/2023    CO2 27.0 07/21/2023    CALCIUM 8.9 07/21/2023    ALBUMIN 3.2 (L) 07/21/2023    BILITOT 0.3 07/21/2023    AST 15 07/21/2023    ALT 5 07/21/2023                  Diagnosis:    (1) Metastatic breast cancer with distant LN (mediastinal) metastases   Triple negative   Unable to do perform foundation one due to limited tissue   Guardant testing was performed on 7/19/23 - showed BRCA2 mutation.        Current therapy:   Weekly carboplatin/paclitaxel     (4/22/22- 7/7/22)      Discontinue to pain peripheral neuropathy in lower extremities.      Switch to XELODA (7//22/22)      PET scan on 9/19/22 showed progressive disease, mainly in right breast/chest wall/regional nodes.      Started on trodelvy.   D1C1: 10/4/22      Palliative RT to right breast/LN.      D1C2: 11/4/22,D8C2: 11/11/22,D1C3: 12/9/22,D8C3: 12/16/22      PET scan on 12/19/22 showed favorable response to treatment. BETO.      D1C4: 12/30/22,D8C4: 1/6/23,D1C5: 1/20/23,D8C5: 1/27/23,D1C6: 2/10/23,D8C6: 2/17/23    D1C7: 3/3/23, D8C7: 3/10/23, D1C8: 3/24/23     PET scan on 3/27/23 showed No evidence of progression.      D8C8: 3/31/23, D1C9: 4/21/23, D8C9: 4/28/23  D1C10: 5/12/23,D8C10: 5/19/23  D1C11:  6/2/23   D8C11: 6/9/23  D1C12: 6/23/23     PET on 7/10/23 showed progressive disease   She was started on liposomal doxorubicin.    Cycle 1: 7/21/23     (2) Cancer associated pain       Assessment & Plan       Chronic issue with exacerbation.  Patient with progressive disease on multiple different lines of therapy.  She received cycle 1 liposomal doxorubicin last week.  Complicated by fatigue, nausea and not feeling well.  Unfortunately her right breast malignancy has progressed significantly in couple of weeks.   She has no ulcerated lesion in the right upper chest along with masslike consolidation involving right breast, right upper abdomen as well as thickening involving the right axilla and back.  Constellation of findings are concerning for progression of her metastatic disease.  We discussed overall poor prognosis and treatment options at length.  Option of palliative radiation once again discussed although she already has received some radiation to the right chest.  After lengthy discussion patient and family both leaning toward hospice and does not want to do any treatment at this point.  Alternative options discussed.  I will arrange for hospice consultation.  Meanwhile, given she is having severe pain I will increase the dose of morphine to 60 mg 3 times daily.  New prescription sent.      Recommendations:   De-escalate care due to poor prognosis  Recommend hospice  Increase MS Contin to 60 mg 3 times daily for pain control.              7/28/2023      CC:

## 2023-07-28 NOTE — THERAPY PROGRESS REPORT/RE-CERT
Outpatient Physical Therapy Ortho Progress Note  [facility]     Patient Name: Radha Duarte  : 1960  MRN: [id number]  Today's Date: 2023      Visit Date: 2023    Visit Dx:    ICD-10-CM ICD-9-CM   1. Humeral head fracture, left, closed, initial encounter  S42.292A 812.09       Patient Active Problem List   Diagnosis    Cancer associated pain    Malignant neoplasm of female breast    Acute deep vein thrombosis (DVT) of axillary vein of left upper extremity    Mucositis due to antineoplastic therapy    Hand foot syndrome    Chemotherapy-induced peripheral neuropathy    Suprapubic pain    Metastatic breast cancer    Hypotension due to hypovolemia    Cancer    Humeral head fracture, left, closed, initial encounter    Accidental fall    Other long term (current) drug therapy        Past Medical History:   Diagnosis Date    Acute deep vein thrombosis (DVT) of brachial vein of right upper extremity 2022    Anemia     Breast cancer 2022    Clotting disorder     Takes blood thinners    Encounter for antineoplastic chemotherapy     2 cycles Taxol/Carbo before    Headache     Hyperlipidemia     Low back pain     Neck. back, & lower back has grown wrost brayan the years    Osteopenia 2018    Osteoporosis     neck, lower back    Scoliosis 2019    Urinary tract infection     Was on a medicine prescription said i indefinitely. I discovered drinking tea was the problem.    Varicose veins of right lower extremity     hx blood clot in right lower calf - age 21        Past Surgical History:   Procedure Laterality Date    BREAST BIOPSY Right 2022    COLONOSCOPY N/A 2020    Procedure: COLONOSCOPY;  Surgeon: Bishop Rodriguez DO;  Location: [facility] ENDOSCOPY;  Service: Gastroenterology;  Laterality: N/A;    LYMPH NODE BIOPSY      MEDIPORT INSERTION, SINGLE Left 2022    SHOULDER MANIPULATION Bilateral     frozen shoulder    TUBAL ABDOMINAL LIGATION    1998    VARICOSE VEIN SURGERY  1986        PT Ortho       Row Name 07/27/23 1500       Precautions and Contraindications    Precautions per MD referral: PROM until 6 weeks post injury (7/19/23), then progress AA/AROM as tolerated no strength until 8-10 weeks. (8/2 or 8/16)  -AC    Contraindications active Breast CA; limited AROM of the R shoulder due to cancer  -AC       Subjective Pain    Able to rate subjective pain? yes  -AC    Pre-Treatment Pain Level 0  -AC       Left Upper Ext    Lt Shoulder Abduction AROM 148 AROM in sidelying  -AC    Lt Shoulder Flexion AROM 159 AROM supine  -AC       MMT (Manual Muscle Testing)    General MMT Comments grossly 4/5 in all planes.  -AC              User Key  (r) = Recorded By, (t) = Taken By, (c) = Cosigned By      Initials Name Provider Type    AC Sanna Ibarra, PT Physical Therapist                                 PT Assessment/Plan       Row Name 07/27/23 1500          PT Assessment    Functional Limitations Limitation in home management;Limitations in functional capacity and performance;Performance in leisure activities;Performance in self-care ADL  -AC     Impairments Impaired flexibility;Muscle strength;Pain;Posture;Range of motion  -AC     Assessment Comments 7 weeks 1 day post-injury. pt is improving well with noted improvments in AROM in all planes with minimal pain increase. pt continues with decreased LUE strength at this time however good tolerance to isometerics. She continues to remain approrpaite for skilled PT to improve overall functional shoulder mobility and strength.  -AC     Rehab Potential Good  -AC     Patient/caregiver participated in establishment of treatment plan and goals Yes  -AC     Patient would benefit from skilled therapy intervention Yes  -AC        PT Plan    PT Frequency 2x/week  -AC     Predicted Duration of Therapy Intervention (PT) 6-8 weeks  -AC     PT Plan Comments continue to progess strength and AROM as tolerated. due to possible  switch to pallative care- may d/c from HEP at anytime pt chooses or if switching to home health services. may add gentle tband exercises to HEP and then d/c next week if pt wishes.  -AC               User Key  (r) = Recorded By, (t) = Taken By, (c) = Cosigned By      Initials Name Provider Type    AC Sanna Ibarra, PT Physical Therapist                       OP Exercises       Row Name 07/27/23 1500             Subjective Comments    Subjective Comments Pt notes doing well today, not having too much pain with new exercises. subjective improvment 65% at this time. pt notes may switch to pallatative care for cancer- if so this would likely be through home care services- unsure home health vs hospice  -AC         Subjective Pain    Able to rate subjective pain? yes  -AC      Pre-Treatment Pain Level 0  -AC         Total Minutes    55121 - PT Therapeutic Exercise Minutes 40  -AC         Exercise 1    Exercise Name 1 Pro II- L1  -AC      Time 1 5/5 fdw/back  -AC         Exercise 2    Exercise Name 2 semi reclined shoulder flexion  -AC      Sets 2 1  -AC      Reps 2 20  -AC         Exercise 3    Exercise Name 3 shoulder abduction AROM in sidelying  -AC      Sets 3 1  -AC      Reps 3 20  -AC         Exercise 4    Exercise Name 4 sidelying ER AROM  -AC      Sets 4 1  -AC      Reps 4 20  -AC      Additional Comments --  -AC         Exercise 5    Exercise Name 5 ROM/MMT- see ortho  -AC         Exercise 6    Exercise Name 6 seated shoulder flexion and abduction to 90  -AC      Sets 6 1  -AC      Reps 6 20  -AC         Exercise 7    Exercise Name 7 [employer] 6-way  -AC      Sets 7 1  -AC      Reps 7 10  -AC      Time 7 5s hold  -AC      Additional Comments added to HEP  -AC                User Key  (r) = Recorded By, (t) = Taken By, (c) = Cosigned By      Initials Name Provider Type    AC Sanna Ibarra, PT Physical Therapist                                  PT OP Goals       Row Name 07/27/23 1500          PT Short  Term Goals    STG Date to Achieve 08/03/23  -     STG 1 Pt is indpt with HEP.  -AC     STG 1 Progress Ongoing  -AC     STG 2 PROM flex/abduction 130 deg or better.  -AC     STG 2 Progress Met  -AC     STG 3 PROM ER (@ 45 abd) 60 deg or better.  -AC     STG 3 Progress Met  -AC     STG 4 Pt demo's L  strength at or above RUE.  -AC     STG 4 Progress Ongoing  -        Long Term Goals    LTG Date to Achieve 08/31/23  -AC     LTG 1 Pt demo's L shoulder AROM flex/abduction to 150 deg or better.  -AC     LTG 1 Progress Partially Met;Ongoing  -AC     LTG 1 Progress Comments flexion met, abduction  -AC     LTG 2 Pt demos L shoulder AROM IR/ER to 70 deg or better.  -AC     LTG 2 Progress Ongoing  -AC     LTG 3 Pt demo's L shoulder MMT 4+/5 or better in all planes.  -AC     LTG 3 Progress Ongoing  -AC     LTG 4 Pt reports able to complete ADLs to pre-injury level.  -AC     LTG 4 Progress Ongoing  -AC     LTG 4 Progress Comments 65% improved per report  -        Time Calculation    PT Goal Re-Cert Due Date 08/17/23  -               User Key  (r) = Recorded By, (t) = Taken By, (c) = Cosigned By      Initials Name Provider Type    Sanna Carreno, PT Physical Therapist                                   Time Calculation:   Start Time: 1519  Stop Time: 1559  Time Calculation (min): 40 min  Timed Charges  13830 - PT Therapeutic Exercise Minutes: 40  Total Minutes  Timed Charges Total Minutes: 40   Total Minutes: 40                Sanna Ibarra PT  7/28/2023

## 2023-09-27 ENCOUNTER — TELEPHONE (OUTPATIENT)
Dept: ONCOLOGY | Facility: CLINIC | Age: 63
End: 2023-09-27

## 2023-09-27 NOTE — TELEPHONE ENCOUNTER
Reason for call: Joan Young with [facility] called has Genetic Test Results and faxing them for Dr Troncoso.  If you need any more information you can call Joan Young    The reason is related to: other      Best phone number to return call: Call back# for Joan Wolff [phone]
